# Patient Record
Sex: MALE | Race: WHITE | Employment: OTHER | URBAN - METROPOLITAN AREA
[De-identification: names, ages, dates, MRNs, and addresses within clinical notes are randomized per-mention and may not be internally consistent; named-entity substitution may affect disease eponyms.]

---

## 2018-09-06 ENCOUNTER — OFFICE VISIT (OUTPATIENT)
Dept: INTERNAL MEDICINE | Age: 63
End: 2018-09-06
Payer: MEDICARE

## 2018-09-06 VITALS
OXYGEN SATURATION: 98 % | BODY MASS INDEX: 35.47 KG/M2 | RESPIRATION RATE: 18 BRPM | DIASTOLIC BLOOD PRESSURE: 77 MMHG | HEIGHT: 67 IN | SYSTOLIC BLOOD PRESSURE: 128 MMHG | TEMPERATURE: 98 F | WEIGHT: 226 LBS | HEART RATE: 70 BPM

## 2018-09-06 DIAGNOSIS — R42 VERTIGO: ICD-10-CM

## 2018-09-06 DIAGNOSIS — H65.92 LEFT NON-SUPPURATIVE OTITIS MEDIA: Primary | ICD-10-CM

## 2018-09-06 DIAGNOSIS — H61.22 IMPACTED CERUMEN OF LEFT EAR: ICD-10-CM

## 2018-09-06 PROCEDURE — 99213 OFFICE O/P EST LOW 20 MIN: CPT | Performed by: NURSE PRACTITIONER

## 2018-09-06 RX ORDER — GLUCOSAMINE/CHONDR SU A SOD 750-600 MG
1500 TABLET ORAL
Status: ON HOLD | COMMUNITY
End: 2019-05-15 | Stop reason: HOSPADM

## 2018-09-06 RX ORDER — GLUCOSA SU 2KCL/CHONDROITIN SU 500-400 MG
100 CAPSULE ORAL DAILY
Status: ON HOLD | COMMUNITY
End: 2019-05-15 | Stop reason: HOSPADM

## 2018-09-06 RX ORDER — NITROGLYCERIN 0.4 MG/1
0.4 TABLET SUBLINGUAL EVERY 5 MIN PRN
COMMUNITY

## 2018-09-06 RX ORDER — ASPIRIN 81 MG/1
81 TABLET, CHEWABLE ORAL DAILY
Status: ON HOLD | COMMUNITY
End: 2019-05-15 | Stop reason: HOSPADM

## 2018-09-06 RX ORDER — CARVEDILOL 6.25 MG/1
6.25 TABLET ORAL 2 TIMES DAILY
COMMUNITY

## 2018-09-06 RX ORDER — PRAVASTATIN SODIUM 40 MG
40 TABLET ORAL DAILY
COMMUNITY
End: 2019-09-06 | Stop reason: ALTCHOICE

## 2018-09-06 ASSESSMENT — PATIENT HEALTH QUESTIONNAIRE - PHQ9
SUM OF ALL RESPONSES TO PHQ QUESTIONS 1-9: 0
SUM OF ALL RESPONSES TO PHQ9 QUESTIONS 1 & 2: 0
1. LITTLE INTEREST OR PLEASURE IN DOING THINGS: 0
2. FEELING DOWN, DEPRESSED OR HOPELESS: 0
SUM OF ALL RESPONSES TO PHQ QUESTIONS 1-9: 0

## 2018-09-06 ASSESSMENT — ENCOUNTER SYMPTOMS
RHINORRHEA: 1
COUGH: 0
SORE THROAT: 0

## 2018-09-06 NOTE — PROGRESS NOTES
Subjective:      Patient ID: Rigoberto Torres is a 70 y.o. male who presents today for:  Chief Complaint   Patient presents with    Ear Fullness     patient c/o not being able to hear out his right ear and is getting hearing roxy soon and gets dizzy ,not all the time        Ear Fullness    There is pain in both ears. This is a new problem. The current episode started in the past 7 days. The problem occurs constantly. The problem has been unchanged. There has been no fever. The patient is experiencing no pain. Associated symptoms include ear discharge, hearing loss and rhinorrhea (slight). Pertinent negatives include no coughing, headaches or sore throat. He has tried nothing for the symptoms. The treatment provided no relief. His past medical history is significant for hearing loss. There is no history of a chronic ear infection. Past Medical History:   Diagnosis Date    Congenital heart disease     Hearing loss      Past Surgical History:   Procedure Laterality Date    CARDIAC SURGERY      EYE SURGERY      JOINT REPLACEMENT      KNEE ARTHROPLASTY       Social History     Social History    Marital status:      Spouse name: N/A    Number of children: N/A    Years of education: N/A     Occupational History    Not on file. Social History Main Topics    Smoking status: Never Smoker    Smokeless tobacco: Current User     Types: Chew    Alcohol use No    Drug use: No    Sexual activity: No     Other Topics Concern    Not on file     Social History Narrative    No narrative on file     History reviewed. No pertinent family history. No Known Allergies  No current outpatient prescriptions on file prior to visit. No current facility-administered medications on file prior to visit. Review of Systems   Constitutional: Negative for activity change, appetite change and chills. HENT: Positive for congestion, ear discharge, hearing loss and rhinorrhea (slight).  Negative for ear

## 2018-09-07 RX ORDER — MECLIZINE HCL 12.5 MG/1
12.5 TABLET ORAL 3 TIMES DAILY PRN
Qty: 30 TABLET | Refills: 0 | Status: SHIPPED | OUTPATIENT
Start: 2018-09-07 | End: 2018-09-17

## 2018-10-01 ENCOUNTER — OFFICE VISIT (OUTPATIENT)
Dept: INTERNAL MEDICINE | Age: 63
End: 2018-10-01
Payer: MEDICARE

## 2018-10-01 VITALS
HEART RATE: 70 BPM | WEIGHT: 229 LBS | BODY MASS INDEX: 32.78 KG/M2 | DIASTOLIC BLOOD PRESSURE: 74 MMHG | OXYGEN SATURATION: 96 % | SYSTOLIC BLOOD PRESSURE: 128 MMHG | HEIGHT: 70 IN

## 2018-10-01 DIAGNOSIS — Z23 NEED FOR PNEUMOCOCCAL VACCINATION: ICD-10-CM

## 2018-10-01 DIAGNOSIS — Z95.1 S/P CORONARY ARTERY BYPASS GRAFT X 5: ICD-10-CM

## 2018-10-01 DIAGNOSIS — I25.10 ATHEROSCLEROSIS OF NATIVE CORONARY ARTERY OF NATIVE HEART WITHOUT ANGINA PECTORIS: ICD-10-CM

## 2018-10-01 DIAGNOSIS — Z11.59 NEED FOR HEPATITIS C SCREENING TEST: ICD-10-CM

## 2018-10-01 DIAGNOSIS — E78.5 HYPERLIPIDEMIA, UNSPECIFIED HYPERLIPIDEMIA TYPE: Primary | ICD-10-CM

## 2018-10-01 DIAGNOSIS — Z12.5 SCREENING FOR PROSTATE CANCER: ICD-10-CM

## 2018-10-01 DIAGNOSIS — Z12.11 COLON CANCER SCREENING: ICD-10-CM

## 2018-10-01 DIAGNOSIS — Z13.1 SCREENING FOR DIABETES MELLITUS (DM): ICD-10-CM

## 2018-10-01 PROCEDURE — 90670 PCV13 VACCINE IM: CPT | Performed by: FAMILY MEDICINE

## 2018-10-01 PROCEDURE — 99203 OFFICE O/P NEW LOW 30 MIN: CPT | Performed by: FAMILY MEDICINE

## 2018-10-01 PROCEDURE — G0009 ADMIN PNEUMOCOCCAL VACCINE: HCPCS | Performed by: FAMILY MEDICINE

## 2018-10-01 NOTE — PROGRESS NOTES
911.       No current facility-administered medications on file prior to visit. No Known Allergies    Chief Complaint   Patient presents with    Establish Care     previous pcp many years ago       HPI    New patient to the office  Currently only following with his cardiologist, getting his prescription refills from him    He does have a history of coronary artery disease, CABG ×5  He currently does follow Dr. Raj Olvera  He does not endorse any chest pain, shortness of breath, lower extremity edema, irregular heartbeat    He is currently on a beta blocker and a statin, no history of hypertension    He is completely overdue for his preventative care and screening    Review of Systems  Constitutional: Negative for fatigue, fever and sweats. HEENT: Negative for eye discharge and vision loss. Negative for ear drainage, hearing loss and nasal drainage. Respiratory: Negative for cough, dyspnea and wheezing. Cardiovascular:  Negative for chest pain, claudication and irregular heartbeat/palpitations. Physical Exam  Vitals:    10/01/18 1035   BP: 128/74   Pulse: 70   SpO2: 96%   Body mass index is 33.33 kg/m². Physical Exam  Constitutional:  Appears well-developed and well-nourished. No distress. HENT:   Head: Normocephalic and atraumatic. Right Ear: External ear normal.   Left Ear: External ear normal.   Nose: Nose normal.   Eyes: Conjunctivae and EOM are normal.  Right eye exhibits no discharge. Left eye exhibits no discharge. No scleral icterus. Neck: Normal range of motion. Cardiovascular: Normal rate, regular rhythm and normal heart sounds. No murmur heard. Pulmonary/Chest: Effort normal and breath sounds normal. No respiratory distress. no wheezes. no rales. no tenderness. Musculoskeletal: Normal range of motion. Neurological: alert. Skin: not diaphoretic. Psychiatric: normal mood and affect.  behavior is normal. Judgment and thought content normal.   Nursing note and vitals

## 2018-10-02 ENCOUNTER — HOSPITAL ENCOUNTER (OUTPATIENT)
Age: 63
Setting detail: SPECIMEN
Discharge: HOME | End: 2018-10-02
Payer: MEDICARE

## 2018-10-02 ENCOUNTER — NURSE ONLY (OUTPATIENT)
Dept: INTERNAL MEDICINE | Age: 63
End: 2018-10-02

## 2018-10-02 DIAGNOSIS — I25.10 ATHEROSCLEROSIS OF NATIVE CORONARY ARTERY OF NATIVE HEART WITHOUT ANGINA PECTORIS: ICD-10-CM

## 2018-10-02 DIAGNOSIS — Z95.1 S/P CORONARY ARTERY BYPASS GRAFT X 5: ICD-10-CM

## 2018-10-02 DIAGNOSIS — E78.5 HYPERLIPIDEMIA, UNSPECIFIED HYPERLIPIDEMIA TYPE: ICD-10-CM

## 2018-10-02 DIAGNOSIS — E78.5 HYPERLIPIDEMIA, UNSPECIFIED HYPERLIPIDEMIA TYPE: Primary | ICD-10-CM

## 2018-10-02 DIAGNOSIS — Z12.5 SCREENING FOR PROSTATE CANCER: ICD-10-CM

## 2018-10-02 DIAGNOSIS — Z11.59 NEED FOR HEPATITIS C SCREENING TEST: ICD-10-CM

## 2018-10-02 LAB
ALBUMIN SERPL-MCNC: 4.3 G/DL (ref 3.9–4.9)
ALP BLD-CCNC: 67 U/L (ref 35–104)
ALT SERPL-CCNC: 19 U/L (ref 0–41)
ANION GAP SERPL CALCULATED.3IONS-SCNC: 16 MEQ/L (ref 7–13)
AST SERPL-CCNC: 21 U/L (ref 0–40)
BASOPHILS ABSOLUTE: 0.1 K/UL (ref 0–0.2)
BASOPHILS RELATIVE PERCENT: 1.1 %
BILIRUB SERPL-MCNC: 0.8 MG/DL (ref 0–1.2)
BUN BLDV-MCNC: 29 MG/DL (ref 8–23)
CALCIUM SERPL-MCNC: 9.3 MG/DL (ref 8.6–10.2)
CHLORIDE BLD-SCNC: 103 MEQ/L (ref 98–107)
CHOLESTEROL, TOTAL: 161 MG/DL (ref 0–199)
CO2: 21 MEQ/L (ref 22–29)
CREAT SERPL-MCNC: 1.38 MG/DL (ref 0.7–1.2)
EOSINOPHILS ABSOLUTE: 0.4 K/UL (ref 0–0.7)
EOSINOPHILS RELATIVE PERCENT: 4.4 %
GFR AFRICAN AMERICAN: >60
GFR NON-AFRICAN AMERICAN: 51.7
GLOBULIN: 3.1 G/DL (ref 2.3–3.5)
GLUCOSE BLD-MCNC: 95 MG/DL (ref 74–109)
HCT VFR BLD CALC: 47.2 % (ref 42–52)
HDLC SERPL-MCNC: 28 MG/DL (ref 40–59)
HEMOGLOBIN: 15.6 G/DL (ref 14–18)
HEPATITIS C ANTIBODY INTERPRETATION: NORMAL
LDL CHOLESTEROL CALCULATED: 92 MG/DL (ref 0–129)
LYMPHOCYTES ABSOLUTE: 1.7 K/UL (ref 1–4.8)
LYMPHOCYTES RELATIVE PERCENT: 19.5 %
MCH RBC QN AUTO: 31.1 PG (ref 27–31.3)
MCHC RBC AUTO-ENTMCNC: 33.1 % (ref 33–37)
MCV RBC AUTO: 93.9 FL (ref 80–100)
MONOCYTES ABSOLUTE: 0.9 K/UL (ref 0.2–0.8)
MONOCYTES RELATIVE PERCENT: 9.8 %
NEUTROPHILS ABSOLUTE: 5.8 K/UL (ref 1.4–6.5)
NEUTROPHILS RELATIVE PERCENT: 65.2 %
PDW BLD-RTO: 14.8 % (ref 11.5–14.5)
PLATELET # BLD: 293 K/UL (ref 130–400)
POTASSIUM SERPL-SCNC: 4.6 MEQ/L (ref 3.5–5.1)
PROSTATE SPECIFIC ANTIGEN: 0.97 NG/ML (ref 0–5.4)
RBC # BLD: 5.02 M/UL (ref 4.7–6.1)
SODIUM BLD-SCNC: 140 MEQ/L (ref 132–144)
TOTAL PROTEIN: 7.4 G/DL (ref 6.4–8.1)
TRIGL SERPL-MCNC: 204 MG/DL (ref 0–200)
WBC # BLD: 8.8 K/UL (ref 4.8–10.8)

## 2018-10-02 PROCEDURE — 85025 COMPLETE CBC W/AUTO DIFF WBC: CPT

## 2018-10-02 PROCEDURE — 86803 HEPATITIS C AB TEST: CPT

## 2018-10-02 PROCEDURE — G0103 PSA SCREENING: HCPCS

## 2018-10-02 PROCEDURE — 80053 COMPREHEN METABOLIC PANEL: CPT

## 2018-10-02 PROCEDURE — 80061 LIPID PANEL: CPT

## 2018-10-03 PROBLEM — N17.9 ACUTE KIDNEY INJURY (HCC): Status: ACTIVE | Noted: 2018-10-03

## 2018-10-08 ENCOUNTER — TELEPHONE (OUTPATIENT)
Dept: OPERATING ROOM | Age: 63
End: 2018-10-08

## 2018-10-16 ENCOUNTER — ANESTHESIA EVENT (OUTPATIENT)
Dept: OPERATING ROOM | Age: 63
End: 2018-10-16
Payer: MEDICARE

## 2018-10-17 ENCOUNTER — HOSPITAL ENCOUNTER (OUTPATIENT)
Age: 63
Setting detail: OUTPATIENT SURGERY
Discharge: HOME | End: 2018-10-17
Attending: SPECIALIST | Admitting: SPECIALIST
Payer: MEDICARE

## 2018-10-17 ENCOUNTER — ANESTHESIA (OUTPATIENT)
Dept: OPERATING ROOM | Age: 63
End: 2018-10-17
Payer: MEDICARE

## 2018-10-17 VITALS
SYSTOLIC BLOOD PRESSURE: 118 MMHG | HEART RATE: 56 BPM | RESPIRATION RATE: 16 BRPM | TEMPERATURE: 98.7 F | HEIGHT: 68 IN | OXYGEN SATURATION: 92 % | DIASTOLIC BLOOD PRESSURE: 73 MMHG | BODY MASS INDEX: 34.71 KG/M2 | WEIGHT: 229 LBS

## 2018-10-17 VITALS
OXYGEN SATURATION: 94 % | DIASTOLIC BLOOD PRESSURE: 75 MMHG | RESPIRATION RATE: 17 BRPM | SYSTOLIC BLOOD PRESSURE: 128 MMHG

## 2018-10-17 PROCEDURE — 7100000011 HC PHASE II RECOVERY - ADDTL 15 MIN: Performed by: SPECIALIST

## 2018-10-17 PROCEDURE — 7100000010 HC PHASE II RECOVERY - FIRST 15 MIN: Performed by: SPECIALIST

## 2018-10-17 PROCEDURE — 3700000001 HC ADD 15 MINUTES (ANESTHESIA): Performed by: SPECIALIST

## 2018-10-17 PROCEDURE — 88305 TISSUE EXAM BY PATHOLOGIST: CPT

## 2018-10-17 PROCEDURE — 2580000003 HC RX 258: Performed by: SPECIALIST

## 2018-10-17 PROCEDURE — 3609027000 HC COLONOSCOPY: Performed by: SPECIALIST

## 2018-10-17 PROCEDURE — 3700000000 HC ANESTHESIA ATTENDED CARE: Performed by: SPECIALIST

## 2018-10-17 PROCEDURE — 2709999900 HC NON-CHARGEABLE SUPPLY: Performed by: SPECIALIST

## 2018-10-17 PROCEDURE — 2580000003 HC RX 258: Performed by: NURSE PRACTITIONER

## 2018-10-17 PROCEDURE — 6360000002 HC RX W HCPCS: Performed by: NURSE ANESTHETIST, CERTIFIED REGISTERED

## 2018-10-17 RX ORDER — SODIUM CHLORIDE 0.9 % (FLUSH) 0.9 %
10 SYRINGE (ML) INJECTION PRN
Status: DISCONTINUED | OUTPATIENT
Start: 2018-10-17 | End: 2018-10-17 | Stop reason: HOSPADM

## 2018-10-17 RX ORDER — MAGNESIUM HYDROXIDE 1200 MG/15ML
LIQUID ORAL PRN
Status: DISCONTINUED | OUTPATIENT
Start: 2018-10-17 | End: 2018-10-17 | Stop reason: HOSPADM

## 2018-10-17 RX ORDER — LIDOCAINE HYDROCHLORIDE 10 MG/ML
1 INJECTION, SOLUTION EPIDURAL; INFILTRATION; INTRACAUDAL; PERINEURAL
Status: DISCONTINUED | OUTPATIENT
Start: 2018-10-17 | End: 2018-10-17 | Stop reason: HOSPADM

## 2018-10-17 RX ORDER — COVID-19 ANTIGEN TEST
KIT MISCELLANEOUS
Status: ON HOLD | COMMUNITY
End: 2019-05-15 | Stop reason: HOSPADM

## 2018-10-17 RX ORDER — PROPOFOL 10 MG/ML
INJECTION, EMULSION INTRAVENOUS PRN
Status: DISCONTINUED | OUTPATIENT
Start: 2018-10-17 | End: 2018-10-17 | Stop reason: SDUPTHER

## 2018-10-17 RX ORDER — SODIUM CHLORIDE 0.9 % (FLUSH) 0.9 %
10 SYRINGE (ML) INJECTION EVERY 12 HOURS SCHEDULED
Status: DISCONTINUED | OUTPATIENT
Start: 2018-10-17 | End: 2018-10-17 | Stop reason: HOSPADM

## 2018-10-17 RX ORDER — ONDANSETRON 2 MG/ML
4 INJECTION INTRAMUSCULAR; INTRAVENOUS
Status: DISCONTINUED | OUTPATIENT
Start: 2018-10-17 | End: 2018-10-17 | Stop reason: HOSPADM

## 2018-10-17 RX ORDER — SODIUM CHLORIDE, SODIUM LACTATE, POTASSIUM CHLORIDE, CALCIUM CHLORIDE 600; 310; 30; 20 MG/100ML; MG/100ML; MG/100ML; MG/100ML
INJECTION, SOLUTION INTRAVENOUS CONTINUOUS
Status: DISCONTINUED | OUTPATIENT
Start: 2018-10-17 | End: 2018-10-17 | Stop reason: HOSPADM

## 2018-10-17 RX ADMIN — PROPOFOL 30 MG: 10 INJECTION, EMULSION INTRAVENOUS at 08:54

## 2018-10-17 RX ADMIN — PROPOFOL 60 MG: 10 INJECTION, EMULSION INTRAVENOUS at 08:42

## 2018-10-17 RX ADMIN — SODIUM CHLORIDE, POTASSIUM CHLORIDE, SODIUM LACTATE AND CALCIUM CHLORIDE: 600; 310; 30; 20 INJECTION, SOLUTION INTRAVENOUS at 08:17

## 2018-10-17 RX ADMIN — PROPOFOL 30 MG: 10 INJECTION, EMULSION INTRAVENOUS at 08:43

## 2018-10-17 RX ADMIN — PROPOFOL 30 MG: 10 INJECTION, EMULSION INTRAVENOUS at 08:49

## 2018-10-17 ASSESSMENT — PULMONARY FUNCTION TESTS
PIF_VALUE: 0
PIF_VALUE: 0
PIF_VALUE: 1
PIF_VALUE: 0

## 2018-10-17 NOTE — ANESTHESIA PRE PROCEDURE
10/02/2018    BUN 29 10/02/2018    CREATININE 1.38 10/02/2018    GFRAA >60.0 10/02/2018    LABGLOM 51.7 10/02/2018    GLUCOSE 95 10/02/2018    PROT 7.4 10/02/2018    CALCIUM 9.3 10/02/2018    BILITOT 0.8 10/02/2018    ALKPHOS 67 10/02/2018    AST 21 10/02/2018    ALT 19 10/02/2018       POC Tests: No results for input(s): POCGLU, POCNA, POCK, POCCL, POCBUN, POCHEMO, POCHCT in the last 72 hours. Coags: No results found for: PROTIME, INR, APTT    HCG (If Applicable): No results found for: PREGTESTUR, PREGSERUM, HCG, HCGQUANT     ABGs: No results found for: PHART, PO2ART, XPK2SHF, SAE8ODV, BEART, H6HAUFFT     Type & Screen (If Applicable):  No results found for: LABABO, [address]    Anesthesia Evaluation  Patient summary reviewed and Nursing notes reviewed  Airway: Mallampati: II  TM distance: >3 FB   Neck ROM: full  Mouth opening: > = 3 FB Dental: normal exam         Pulmonary:Negative Pulmonary ROS and normal exam                               Cardiovascular:    (+) hypertension:, CAD:, hyperlipidemia         Beta Blocker:  Dose within 24 Hrs         Neuro/Psych:   Negative Neuro/Psych ROS              GI/Hepatic/Renal: Neg GI/Hepatic/Renal ROS            Endo/Other: Negative Endo/Other ROS                    Abdominal:           Vascular: negative vascular ROS. Anesthesia Plan      MAC     ASA 2       Induction: intravenous. Anesthetic plan and risks discussed with patient. Plan discussed with attending.                   Malia Scherer, APRN - CRNA   10/17/2018

## 2018-11-28 ENCOUNTER — OFFICE VISIT (OUTPATIENT)
Dept: INTERNAL MEDICINE | Age: 63
End: 2018-11-28
Payer: MEDICARE

## 2018-11-28 VITALS
DIASTOLIC BLOOD PRESSURE: 72 MMHG | TEMPERATURE: 98.1 F | WEIGHT: 234 LBS | HEART RATE: 97 BPM | BODY MASS INDEX: 35.58 KG/M2 | OXYGEN SATURATION: 97 % | SYSTOLIC BLOOD PRESSURE: 124 MMHG

## 2018-11-28 DIAGNOSIS — H66.90 ACUTE OTITIS MEDIA, UNSPECIFIED OTITIS MEDIA TYPE: Primary | ICD-10-CM

## 2018-11-28 DIAGNOSIS — J32.9 SINUSITIS, UNSPECIFIED CHRONICITY, UNSPECIFIED LOCATION: ICD-10-CM

## 2018-11-28 PROCEDURE — 99213 OFFICE O/P EST LOW 20 MIN: CPT | Performed by: NURSE PRACTITIONER

## 2018-11-28 RX ORDER — AMOXICILLIN AND CLAVULANATE POTASSIUM 875; 125 MG/1; MG/1
1 TABLET, FILM COATED ORAL 2 TIMES DAILY
Qty: 20 TABLET | Refills: 0 | Status: SHIPPED | OUTPATIENT
Start: 2018-11-28 | End: 2018-12-08

## 2018-11-28 ASSESSMENT — ENCOUNTER SYMPTOMS
SHORTNESS OF BREATH: 0
EYE DISCHARGE: 1
GASTROINTESTINAL NEGATIVE: 1
RHINORRHEA: 1
COUGH: 1
WHEEZING: 1
SORE THROAT: 0
CHEST TIGHTNESS: 0
SINUS PRESSURE: 1

## 2018-11-28 NOTE — PROGRESS NOTES
Subjective:      Patient ID: Haresh Sims is a 70 y.o. male who presents today for:  Chief Complaint   Patient presents with    Congestion     head and chest x week. productive cough. taking dayquil robutussin and alkaseltzer       URI    This is a new problem. The current episode started in the past 7 days. There has been no fever. Associated symptoms include congestion, coughing, rhinorrhea and wheezing. Pertinent negatives include no ear pain, headaches or sore throat.        Past Medical History:   Diagnosis Date    Acute kidney injury ([address] 10/3/2018    Congenital heart disease     Coronary atherosclerosis 9/8/2015    Hearing loss     HTN (hypertension) 9/8/2015    Hyperlipidemia 9/8/2015     Past Surgical History:   Procedure Laterality Date    CARDIAC SURGERY      EYE SURGERY      JOINT REPLACEMENT      left shoulder, both knees    KNEE ARTHROPLASTY      [place] COLON CA SCRN NOT  [address] IND N/A 10/17/2018    COLONOSCOPY performed by Dax Nails MD at [place] [address] Marital status:      Spouse name: N/A    Number of children: 2    Years of education: N/A     Occupational History    retired      he was a philip x 48 years     Social History Main Topics    Smoking status: Never Smoker    Smokeless tobacco: Current User     Types: Chew    Alcohol use No    Drug use: No    Sexual activity: No     Other Topics Concern    Not on file     Social History Narrative    No narrative on file     Family History   Problem Relation Age of Onset    Alzheimer's Disease Mother     Cancer Father         leukemia      No Known Allergies  Current Outpatient Prescriptions on File Prior to Visit   Medication Sig Dispense Refill    Naproxen Sodium (ALEVE) 220 MG CAPS Take by mouth      aspirin (ASPIRIN 81) 81 MG chewable tablet Take 81 mg by mouth Daily      carvedilol (COREG) 6.25 MG tablet Take 6.25 mg by mouth Daily      Glucosamine HCl 1500 MG TABS Take 1,500 mg by mouth Daily with lunch      Coenzyme Q10 (CO Q10) 100 MG CAPS Take 100 mg by mouth daily      pravastatin (PRAVACHOL) 40 MG tablet Take 40 mg by mouth daily      nitroGLYCERIN (NITROSTAT) 0.4 MG SL tablet Place 0.4 mg under the tongue every 5 minutes as needed for Chest pain up to max of 3 total doses. If no relief after 1 dose, call 911. No current facility-administered medications on file prior to visit. Review of Systems   Constitutional: Negative for chills, fatigue and fever. HENT: Positive for congestion, rhinorrhea and sinus pressure. Negative for ear pain and sore throat. Eyes: Positive for discharge. Respiratory: Positive for cough and wheezing. Negative for chest tightness and shortness of breath. Cardiovascular: Negative. Gastrointestinal: Negative. Endocrine: Negative. Genitourinary: Negative. Musculoskeletal: Negative for myalgias. Skin: Negative. Allergic/Immunologic: Negative for environmental allergies. Neurological: Negative. Negative for headaches. Hematological: Negative. Psychiatric/Behavioral: Negative. Objective:   /72   Pulse 97   Temp 98.1 °F (36.7 °C) (Oral)   Wt 234 lb (106.1 kg)   SpO2 97%   BMI 35.58 kg/m²     Physical Exam   Constitutional: He is oriented to person, place, and time. He appears well-developed. HENT:   Head: Normocephalic. Left ear cloudy effusion   Eyes: Right eye exhibits no discharge. Left eye exhibits no discharge. Neck: Normal range of motion. Cardiovascular: Normal rate, regular rhythm and normal heart sounds. Pulmonary/Chest: Effort normal and breath sounds normal. No respiratory distress. Musculoskeletal: Normal range of motion. Neurological: He is alert and oriented to person, place, and time. Skin: Skin is warm. He is not diaphoretic. Psychiatric: He has a normal mood and affect. Assessment:       Diagnosis Orders   1.  Acute otitis media, unspecified otitis

## 2019-05-08 ENCOUNTER — HOSPITAL ENCOUNTER (EMERGENCY)
Age: 64
Discharge: ANOTHER HOSPITAL | End: 2019-05-08
Attending: EMERGENCY MEDICINE
Payer: MEDICARE

## 2019-05-08 ENCOUNTER — APPOINTMENT (OUTPATIENT)
Dept: CT IMAGING | Age: 64
End: 2019-05-08
Payer: MEDICARE

## 2019-05-08 ENCOUNTER — HOSPITAL ENCOUNTER (OUTPATIENT)
Age: 64
Setting detail: OBSERVATION
Discharge: HOME | End: 2019-05-09
Attending: INTERNAL MEDICINE | Admitting: INTERNAL MEDICINE
Payer: MEDICARE

## 2019-05-08 VITALS
RESPIRATION RATE: 16 BRPM | HEIGHT: 68 IN | BODY MASS INDEX: 34.1 KG/M2 | WEIGHT: 225 LBS | OXYGEN SATURATION: 96 % | SYSTOLIC BLOOD PRESSURE: 137 MMHG | DIASTOLIC BLOOD PRESSURE: 65 MMHG | HEART RATE: 67 BPM | TEMPERATURE: 98.4 F

## 2019-05-08 DIAGNOSIS — N20.0 KIDNEY STONE: Primary | ICD-10-CM

## 2019-05-08 DIAGNOSIS — R10.9 FLANK PAIN: ICD-10-CM

## 2019-05-08 LAB
ALBUMIN SERPL-MCNC: 4.5 G/DL (ref 3.5–4.6)
ALP BLD-CCNC: 70 U/L (ref 35–104)
ALT SERPL-CCNC: 25 U/L (ref 0–41)
ANION GAP SERPL CALCULATED.3IONS-SCNC: 15 MEQ/L (ref 9–15)
AST SERPL-CCNC: 30 U/L (ref 0–40)
BACTERIA: ABNORMAL /HPF
BASOPHILS ABSOLUTE: 0 K/UL (ref 0–0.2)
BASOPHILS RELATIVE PERCENT: 0.2 %
BILIRUB SERPL-MCNC: 1.3 MG/DL (ref 0.2–0.7)
BILIRUBIN URINE: NEGATIVE
BLOOD, URINE: ABNORMAL
BUN BLDV-MCNC: 26 MG/DL (ref 8–23)
CALCIUM SERPL-MCNC: 9.6 MG/DL (ref 8.5–9.9)
CHLORIDE BLD-SCNC: 102 MEQ/L (ref 95–107)
CLARITY: CLEAR
CO2: 24 MEQ/L (ref 20–31)
COLOR: YELLOW
CREAT SERPL-MCNC: 1.34 MG/DL (ref 0.7–1.2)
EOSINOPHILS ABSOLUTE: 0.1 K/UL (ref 0–0.7)
EOSINOPHILS RELATIVE PERCENT: 0.7 %
EPITHELIAL CELLS, UA: ABNORMAL /HPF
GFR AFRICAN AMERICAN: >60
GFR NON-AFRICAN AMERICAN: 53.3
GLOBULIN: 3.6 G/DL (ref 2.3–3.5)
GLUCOSE BLD-MCNC: 97 MG/DL (ref 70–99)
GLUCOSE URINE: NEGATIVE MG/DL
HCT VFR BLD CALC: 47.2 % (ref 42–52)
HEMOGLOBIN: 15.7 G/DL (ref 14–18)
KETONES, URINE: 15 MG/DL
LEUKOCYTE ESTERASE, URINE: NEGATIVE
LYMPHOCYTES ABSOLUTE: 1.3 K/UL (ref 1–4.8)
LYMPHOCYTES RELATIVE PERCENT: 9.7 %
MCH RBC QN AUTO: 31.1 PG (ref 27–31.3)
MCHC RBC AUTO-ENTMCNC: 33.2 % (ref 33–37)
MCV RBC AUTO: 93.6 FL (ref 80–100)
MONOCYTES ABSOLUTE: 0.6 K/UL (ref 0.2–0.8)
MONOCYTES RELATIVE PERCENT: 4.4 %
NEUTROPHILS ABSOLUTE: 11.3 K/UL (ref 1.4–6.5)
NEUTROPHILS RELATIVE PERCENT: 85 %
NITRITE, URINE: NEGATIVE
PDW BLD-RTO: 13.8 % (ref 11.5–14.5)
PH UA: 5 (ref 5–9)
PLATELET # BLD: 297 K/UL (ref 130–400)
POTASSIUM SERPL-SCNC: 4.8 MEQ/L (ref 3.4–4.9)
PROTEIN UA: 100 MG/DL
RBC # BLD: 5.04 M/UL (ref 4.7–6.1)
RBC UA: >100 /HPF (ref 0–2)
SODIUM BLD-SCNC: 141 MEQ/L (ref 135–144)
SPECIFIC GRAVITY UA: >=1.03 (ref 1–1.03)
TOTAL PROTEIN: 8.1 G/DL (ref 6.3–8)
URINE REFLEX TO CULTURE: YES
UROBILINOGEN, URINE: 0.2 E.U./DL
WBC # BLD: 13.3 K/UL (ref 4.8–10.8)
WBC UA: ABNORMAL /HPF (ref 0–5)

## 2019-05-08 PROCEDURE — 85025 COMPLETE CBC W/AUTO DIFF WBC: CPT

## 2019-05-08 PROCEDURE — 36415 COLL VENOUS BLD VENIPUNCTURE: CPT

## 2019-05-08 PROCEDURE — 2580000003 HC RX 258: Performed by: EMERGENCY MEDICINE

## 2019-05-08 PROCEDURE — 99285 EMERGENCY DEPT VISIT HI MDM: CPT

## 2019-05-08 PROCEDURE — 74176 CT ABD & PELVIS W/O CONTRAST: CPT

## 2019-05-08 PROCEDURE — 6370000000 HC RX 637 (ALT 250 FOR IP): Performed by: EMERGENCY MEDICINE

## 2019-05-08 PROCEDURE — 81001 URINALYSIS AUTO W/SCOPE: CPT

## 2019-05-08 PROCEDURE — 6370000000 HC RX 637 (ALT 250 FOR IP): Performed by: INTERNAL MEDICINE

## 2019-05-08 PROCEDURE — 96374 THER/PROPH/DIAG INJ IV PUSH: CPT

## 2019-05-08 PROCEDURE — G0378 HOSPITAL OBSERVATION PER HR: HCPCS

## 2019-05-08 PROCEDURE — 96375 TX/PRO/DX INJ NEW DRUG ADDON: CPT

## 2019-05-08 PROCEDURE — 6360000002 HC RX W HCPCS: Performed by: INTERNAL MEDICINE

## 2019-05-08 PROCEDURE — 2580000003 HC RX 258: Performed by: INTERNAL MEDICINE

## 2019-05-08 PROCEDURE — 80053 COMPREHEN METABOLIC PANEL: CPT

## 2019-05-08 PROCEDURE — 6360000002 HC RX W HCPCS: Performed by: EMERGENCY MEDICINE

## 2019-05-08 PROCEDURE — 87086 URINE CULTURE/COLONY COUNT: CPT

## 2019-05-08 RX ORDER — ONDANSETRON 2 MG/ML
4 INJECTION INTRAMUSCULAR; INTRAVENOUS ONCE
Status: COMPLETED | OUTPATIENT
Start: 2019-05-08 | End: 2019-05-08

## 2019-05-08 RX ORDER — SODIUM CHLORIDE 0.9 % (FLUSH) 0.9 %
3 SYRINGE (ML) INJECTION EVERY 8 HOURS
Status: DISCONTINUED | OUTPATIENT
Start: 2019-05-08 | End: 2019-05-08 | Stop reason: HOSPADM

## 2019-05-08 RX ORDER — ACETAMINOPHEN 325 MG/1
650 TABLET ORAL EVERY 4 HOURS PRN
Status: DISCONTINUED | OUTPATIENT
Start: 2019-05-08 | End: 2019-05-09 | Stop reason: HOSPADM

## 2019-05-08 RX ORDER — MORPHINE SULFATE 4 MG/ML
4 INJECTION, SOLUTION INTRAMUSCULAR; INTRAVENOUS ONCE
Status: COMPLETED | OUTPATIENT
Start: 2019-05-08 | End: 2019-05-08

## 2019-05-08 RX ORDER — SODIUM CHLORIDE 9 MG/ML
INJECTION, SOLUTION INTRAVENOUS CONTINUOUS
Status: DISCONTINUED | OUTPATIENT
Start: 2019-05-08 | End: 2019-05-09 | Stop reason: HOSPADM

## 2019-05-08 RX ORDER — ASPIRIN 81 MG/1
81 TABLET, CHEWABLE ORAL DAILY
Status: DISCONTINUED | OUTPATIENT
Start: 2019-05-09 | End: 2019-05-09

## 2019-05-08 RX ORDER — CIPROFLOXACIN 2 MG/ML
400 INJECTION, SOLUTION INTRAVENOUS ONCE
Status: COMPLETED | OUTPATIENT
Start: 2019-05-08 | End: 2019-05-08

## 2019-05-08 RX ORDER — SODIUM CHLORIDE 9 MG/ML
INJECTION, SOLUTION INTRAVENOUS CONTINUOUS
Status: DISCONTINUED | OUTPATIENT
Start: 2019-05-08 | End: 2019-05-08 | Stop reason: HOSPADM

## 2019-05-08 RX ORDER — SODIUM CHLORIDE 0.9 % (FLUSH) 0.9 %
10 SYRINGE (ML) INJECTION PRN
Status: DISCONTINUED | OUTPATIENT
Start: 2019-05-08 | End: 2019-05-09 | Stop reason: HOSPADM

## 2019-05-08 RX ORDER — ONDANSETRON 2 MG/ML
4 INJECTION INTRAMUSCULAR; INTRAVENOUS EVERY 6 HOURS PRN
Status: DISCONTINUED | OUTPATIENT
Start: 2019-05-08 | End: 2019-05-09 | Stop reason: HOSPADM

## 2019-05-08 RX ORDER — SODIUM CHLORIDE 0.9 % (FLUSH) 0.9 %
10 SYRINGE (ML) INJECTION EVERY 12 HOURS SCHEDULED
Status: DISCONTINUED | OUTPATIENT
Start: 2019-05-08 | End: 2019-05-09 | Stop reason: HOSPADM

## 2019-05-08 RX ORDER — TAMSULOSIN HYDROCHLORIDE 0.4 MG/1
0.4 CAPSULE ORAL ONCE
Status: COMPLETED | OUTPATIENT
Start: 2019-05-08 | End: 2019-05-08

## 2019-05-08 RX ORDER — PRAVASTATIN SODIUM 40 MG
40 TABLET ORAL DAILY
Status: DISCONTINUED | OUTPATIENT
Start: 2019-05-09 | End: 2019-05-09 | Stop reason: HOSPADM

## 2019-05-08 RX ORDER — KETOROLAC TROMETHAMINE 15 MG/ML
15 INJECTION, SOLUTION INTRAMUSCULAR; INTRAVENOUS EVERY 6 HOURS PRN
Status: DISCONTINUED | OUTPATIENT
Start: 2019-05-08 | End: 2019-05-09

## 2019-05-08 RX ORDER — KETOROLAC TROMETHAMINE 30 MG/ML
30 INJECTION, SOLUTION INTRAMUSCULAR; INTRAVENOUS ONCE
Status: COMPLETED | OUTPATIENT
Start: 2019-05-08 | End: 2019-05-08

## 2019-05-08 RX ORDER — DOXAZOSIN 2 MG/1
4 TABLET ORAL NIGHTLY
Status: DISCONTINUED | OUTPATIENT
Start: 2019-05-08 | End: 2019-05-09 | Stop reason: HOSPADM

## 2019-05-08 RX ORDER — 0.9 % SODIUM CHLORIDE 0.9 %
500 INTRAVENOUS SOLUTION INTRAVENOUS ONCE
Status: COMPLETED | OUTPATIENT
Start: 2019-05-08 | End: 2019-05-08

## 2019-05-08 RX ORDER — CARVEDILOL 6.25 MG/1
6.25 TABLET ORAL DAILY
Status: DISCONTINUED | OUTPATIENT
Start: 2019-05-09 | End: 2019-05-09 | Stop reason: HOSPADM

## 2019-05-08 RX ADMIN — KETOROLAC TROMETHAMINE 30 MG: 30 INJECTION, SOLUTION INTRAMUSCULAR at 17:48

## 2019-05-08 RX ADMIN — TAMSULOSIN HYDROCHLORIDE 0.4 MG: 0.4 CAPSULE ORAL at 19:40

## 2019-05-08 RX ADMIN — HYDROMORPHONE HYDROCHLORIDE 0.5 MG: 1 INJECTION, SOLUTION INTRAMUSCULAR; INTRAVENOUS; SUBCUTANEOUS at 17:48

## 2019-05-08 RX ADMIN — SODIUM CHLORIDE: 9 INJECTION, SOLUTION INTRAVENOUS at 17:48

## 2019-05-08 RX ADMIN — SODIUM CHLORIDE 500 ML: 9 INJECTION, SOLUTION INTRAVENOUS at 17:10

## 2019-05-08 RX ADMIN — Medication 3 ML: at 19:40

## 2019-05-08 RX ADMIN — DOXAZOSIN 4 MG: 2 TABLET ORAL at 22:00

## 2019-05-08 RX ADMIN — Medication 10 ML: at 22:01

## 2019-05-08 RX ADMIN — SODIUM CHLORIDE: 9 INJECTION, SOLUTION INTRAVENOUS at 22:00

## 2019-05-08 RX ADMIN — KETOROLAC TROMETHAMINE 15 MG: 15 INJECTION, SOLUTION INTRAMUSCULAR; INTRAVENOUS at 22:00

## 2019-05-08 RX ADMIN — MORPHINE SULFATE 4 MG: 4 INJECTION, SOLUTION INTRAMUSCULAR; INTRAVENOUS at 17:11

## 2019-05-08 RX ADMIN — Medication 3 ML: at 17:18

## 2019-05-08 RX ADMIN — ONDANSETRON 4 MG: 2 INJECTION INTRAMUSCULAR; INTRAVENOUS at 17:11

## 2019-05-08 RX ADMIN — CIPROFLOXACIN 400 MG: 2 INJECTION, SOLUTION INTRAVENOUS at 18:25

## 2019-05-08 ASSESSMENT — ENCOUNTER SYMPTOMS
CHEST TIGHTNESS: 0
EYE DISCHARGE: 0
BACK PAIN: 0
VOICE CHANGE: 0
DIARRHEA: 0
TROUBLE SWALLOWING: 0
SINUS PRESSURE: 0
CONSTIPATION: 0
SHORTNESS OF BREATH: 0
STRIDOR: 0
CHOKING: 0
FACIAL SWELLING: 0
BLOOD IN STOOL: 0
SORE THROAT: 0
VOMITING: 0
ABDOMINAL PAIN: 1
EYE PAIN: 0
COUGH: 0
WHEEZING: 0
EYE REDNESS: 0

## 2019-05-08 ASSESSMENT — PAIN - FUNCTIONAL ASSESSMENT: PAIN_FUNCTIONAL_ASSESSMENT: ACTIVITIES ARE NOT PREVENTED

## 2019-05-08 ASSESSMENT — PAIN DESCRIPTION - FREQUENCY: FREQUENCY: INTERMITTENT

## 2019-05-08 ASSESSMENT — PAIN DESCRIPTION - ONSET: ONSET: SUDDEN

## 2019-05-08 ASSESSMENT — PAIN DESCRIPTION - PAIN TYPE: TYPE: ACUTE PAIN

## 2019-05-08 ASSESSMENT — PAIN DESCRIPTION - ORIENTATION: ORIENTATION: RIGHT

## 2019-05-08 ASSESSMENT — PAIN DESCRIPTION - DESCRIPTORS: DESCRIPTORS: SHARP

## 2019-05-08 ASSESSMENT — PAIN SCALES - GENERAL
PAINLEVEL_OUTOF10: 4
PAINLEVEL_OUTOF10: 0
PAINLEVEL_OUTOF10: 6

## 2019-05-08 ASSESSMENT — PAIN DESCRIPTION - PROGRESSION: CLINICAL_PROGRESSION: NOT CHANGED

## 2019-05-08 ASSESSMENT — PAIN DESCRIPTION - LOCATION: LOCATION: FLANK

## 2019-05-08 NOTE — ED NOTES
Dr. Leilani Angeles accepted this patient. Called the TC, spoke to Tejinder Beckham, to get a room assignment and to set up transportation; they will call back with same.      Johnathan Phillips  05/08/19 1945

## 2019-05-08 NOTE — ED PROVIDER NOTES
Neurological: Negative for tremors, seizures, syncope, weakness, numbness and headaches. Hematological: Negative for adenopathy. Does not bruise/bleed easily. Psychiatric/Behavioral: Negative for agitation, behavioral problems, hallucinations and sleep disturbance. The patient is not hyperactive. All other systems reviewed and are negative. Except as noted above the remainder of the review of systems was reviewed and negative. PAST MEDICAL HISTORY     Past Medical History:   Diagnosis Date    Acute kidney [facility] [address] [address] 10/3/2018    Congenital heart disease     Coronary atherosclerosis 9/8/2015    Hearing loss     HTN (hypertension) 9/8/2015    Hyperlipidemia 9/8/2015         SURGICALHISTORY       Past Surgical History:   Procedure Laterality Date    CARDIAC SURGERY      EYE SURGERY      JOINT REPLACEMENT      left shoulder, both knees    KNEE ARTHROPLASTY      [place] COLON CA [address]CRN NOT  [address]  IND N/A 10/17/2018    COLONOS[facility] performed by Dawne Sicard, MD at [facility]       Previous Medications    ASPIRIN (ASPIRIN 81) 81 MG CHEWABLE TABLET    Take 81 mg by mouth Daily    CARVEDILOL (COREG) 6.25 MG TABLET    Take 6.25 mg by mouth Daily    COENZYME Q10 (CO Q10) 100 MG CAPS    Take 100 mg by mouth daily    GLUCOSAMINE HCL 1500 MG TABS    Take 1,500 mg by mouth Daily with lunch    NAPROXEN SODIUM (ALEVE) 220 MG CAPS    Take by mouth    NITROGLYCERIN (NITROSTAT) 0.4 MG SL TABLET    Place 0.4 mg under the tongue every 5 minutes as needed for Chest pain up to max of 3 total doses. If no relief after 1 dose, call 911. PRAVASTATIN (PRAVACHOL) 40 MG TABLET    Take 40 mg by mouth daily       ALLERGIES     Patient has no known allergies.     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   Social Needs    Financial resource strain: None    Food insecurity:     Worry: None     Inability: None    Transportation needs:     Medical: None     Non-medical: None   Tobacco Use    Smoking status: Never Smoker    Smokeless tobacco: Current User     Types: Chew   Substance and Sexual Activity    Alcohol use: No    Drug use: No    Sexual activity: Never   Lifestyle    Physical activity:     Days per week: None     Minutes per session: None    Stress: None   Relationships    Social connections:     Talks on phone: None     Gets together: None     Attends Taoist service: None     Active member of club or organization: None     Attends meetings of clubs or organizations: None     Relationship status: None    Intimate partner violence:     Fear of current or ex partner: None     Emotionally abused: None     Physically abused: None     Forced sexual activity: None   Other Topics Concern    None   Social History Narrative    None       SCREENINGS      @FLOW([id number])@      PHYSICAL EXAM    (up to 7 for level 4, 8 or more for level 5)     ED Triage Vitals [05/08/19 1652]   BP Temp Temp Source Pulse Resp SpO2 Height Weight   (!) 188/95 98.2 °F (36.8 °C) Oral 75 16 95 % 5' 7.5\" (1.715 m) 225 lb (102.1 kg)       Physical Exam   Constitutional: He is oriented to person, place, and time. He appears well-developed and well-nourished. Ambulatory patient nontoxic slightly uncomfortable because of the flank pain   HENT:   Head: Normocephalic. Right Ear: External ear normal.   Left Ear: External ear normal.   Nose: Nose normal.   Mouth/Throat: Oropharynx is clear and moist.   Eyes: Pupils are equal, round, and reactive to light. Neck: Normal range of motion. Neck supple.    Cardiovascular: Normal rate, regular rhythm and normal heart sounds. Exam reveals no gallop. No murmur heard. Pulmonary/Chest: Breath sounds normal. No respiratory distress. He has no wheezes. Abdominal: Soft. Bowel sounds are normal. He exhibits no mass. There is no rebound. Musculoskeletal: Normal range of motion. He exhibits no edema or tenderness. Neurological: He is alert and oriented to person, place, and time. No cranial nerve deficit. He exhibits normal muscle tone. Skin: Skin is warm. No rash noted. No erythema. Psychiatric: His behavior is normal. Thought content normal.   Vitals reviewed. DIAGNOSTIC RESULTS     EKG: All EKG's are interpreted by the Emergency Department Physician who either signs or Co-signsthis chart in the absence of a cardiologist.        RADIOLOGY:   Leroy Ebbs such as CT, Ultrasound and MRI are read by the radiologist. Sara Luong radiographic images are visualized and preliminarily interpreted by the emergency physician with the below findings:        Interpretation per the Radiologist below, if available at the time ofthis note:    CT ABDOMEN PELVIS WO CONTRAST Additional Contrast? None   Final Result      Moderate right hydroureteronephrosis with right-sided perinephric stranding secondary to a 7 mm calculus within the proximal right ureter. Additional nonobstructing renal calculi bilaterally. Mild interval increase in size of a fusiform infrarenal abdominal aortic aneurysm measuring up to 3.5 cm in diameter. Continued surveillance recommended. 2.3 cm left adrenal nodule has not significantly changed since 2013 and likely relates to adenoma in this patient without a provided history of malignancy. Hepatic steatosis. Small hiatal hernia. Colonic diverticulosis without diverticulitis. Mild enlargement of the prostate gland.          All CT scans at this facility use dose modulation, iterative reconstruction, and/or weight based dosing when appropriate to reduce radiation dose to as low as reasonably achievable. ED BEDSIDE ULTRASOUND:   Performed by ED Physician - none    LABS:  Labs Reviewed   COMPREHENSIVE METABOLIC PANEL - Abnormal; Notable for the following components:       Result Value    BUN 26 (*)     CREATININE 1.34 (*)     GFR Non- 53.3 (*)     Total Protein 8.1 (*)     Total Bilirubin 1.3 (*)     Globulin 3.6 (*)     All other components within normal limits   CBC WITH AUTO DIFFERENTIAL - Abnormal; Notable for the following components:    WBC 13.3 (*)     Neutrophils # 11.3 (*)     All other components within normal limits   URINE RT REFLEX TO CULTURE       All other labs were within normal range or not returned as of this dictation. EMERGENCY DEPARTMENT COURSE and DIFFERENTIAL DIAGNOSIS/MDM:   Vitals:    Vitals:    05/08/19 1652 05/08/19 1800 05/08/19 1836   BP: (!) 188/95 135/64    Pulse: 75  68   Resp: 16  18   Temp: 98.2 °F (36.8 °C)  98.4 °F (36.9 °C)   TempSrc: Oral  Oral   SpO2: 95%  95%   Weight: 225 lb (102.1 kg)     Height: 5' 7.5\" (1.715 m)             MDM  Number of Diagnoses or Management Options  Flank pain:   Kidney stone:   Diagnosis management comments: Patient with known history of kidney stone last time patient requires some intervention and stent placement as per patient 7 pain to the right flank and patient has a 7 mm proximal stone at this time with hydronephrosis slightly better with the pain patient isn't known history of  abd aneurysm which is stable at this time nausea is better.   Discussed with the hospitalist [address] [place] [address] as patient will require some intervention by the urologist, DR Wesley Scott  Case  Discussed   Refuses to accept  And  Transfer  N discussed with the urologist on call Edna Cabrales who agrees with transfer and accepted the patient for transfer but requesting admission to the hospitalist, DR BRIGGS [facility]  consulted and who accepted the patient for transfer to [facility]        Amount and/or Complexity of Data Reviewed  Clinical lab tests: ordered and reviewed  Tests in the radiology section of CPT®: ordered and reviewed        CRITICAL CARE TIME   Total Critical Care time was minutes, excluding separately reportableprocedures. There was a high probability of clinicallysignificant/life threatening deterioration in the patient's condition which required my urgent intervention. CONSULTS:  None    PROCEDURES:  Unless otherwise noted below, none     Procedures    FINAL IMPRESSION      1. Kidney stone    2. Flank pain          DISPOSITION/PLAN   DISPOSITION        PATIENT REFERRED TO:  No follow-up provider specified.     DISCHARGE MEDICATIONS:  New Prescriptions    No medications on file          (Please note that portions of this note were completed with a voice recognition program.  Efforts were made to edit the dictations but occasionally words are mis-transcribed.)    Rodrigo Michel MD (electronically signed)  Attending Emergency Physician       Rodrigo Michel MD  05/08/19 5088

## 2019-05-08 NOTE — ED NOTES
[id number] [facility] hospitalist, Dr. Jagdish Alan, paged for Dr. Louann Simmonds.      Paul King  05/08/19 1925

## 2019-05-08 NOTE — ED NOTES
[facility] hospitalist, Dr. Michelle Bo, paged for Dr. Nereida Snyder.      [facility]  05/08/19 0717

## 2019-05-09 ENCOUNTER — ANESTHESIA EVENT (OUTPATIENT)
Dept: OPERATING ROOM | Age: 64
End: 2019-05-09
Payer: MEDICARE

## 2019-05-09 ENCOUNTER — ANESTHESIA (OUTPATIENT)
Dept: OPERATING ROOM | Age: 64
End: 2019-05-09
Payer: MEDICARE

## 2019-05-09 ENCOUNTER — APPOINTMENT (OUTPATIENT)
Dept: GENERAL RADIOLOGY | Age: 64
End: 2019-05-09
Attending: INTERNAL MEDICINE
Payer: MEDICARE

## 2019-05-09 ENCOUNTER — TELEPHONE (OUTPATIENT)
Dept: UROLOGY | Age: 64
End: 2019-05-09

## 2019-05-09 VITALS
RESPIRATION RATE: 14 BRPM | HEART RATE: 57 BPM | WEIGHT: 225 LBS | SYSTOLIC BLOOD PRESSURE: 136 MMHG | HEIGHT: 68 IN | BODY MASS INDEX: 34.1 KG/M2 | OXYGEN SATURATION: 96 % | TEMPERATURE: 98.6 F | DIASTOLIC BLOOD PRESSURE: 74 MMHG

## 2019-05-09 VITALS — SYSTOLIC BLOOD PRESSURE: 102 MMHG | DIASTOLIC BLOOD PRESSURE: 56 MMHG | OXYGEN SATURATION: 97 % | TEMPERATURE: 96.6 F

## 2019-05-09 DIAGNOSIS — N20.0 KIDNEY STONE: Primary | ICD-10-CM

## 2019-05-09 LAB
ALBUMIN SERPL-MCNC: 3.5 G/DL (ref 3.5–4.6)
ALP BLD-CCNC: 54 U/L (ref 35–104)
ALT SERPL-CCNC: 18 U/L (ref 0–41)
ANION GAP SERPL CALCULATED.3IONS-SCNC: 13 MEQ/L (ref 9–15)
AST SERPL-CCNC: 18 U/L (ref 0–40)
BASOPHILS ABSOLUTE: 0 K/UL (ref 0–0.2)
BASOPHILS RELATIVE PERCENT: 0.5 %
BILIRUB SERPL-MCNC: 1.1 MG/DL (ref 0.2–0.7)
BUN BLDV-MCNC: 24 MG/DL (ref 8–23)
CALCIUM SERPL-MCNC: 8.1 MG/DL (ref 8.5–9.9)
CHLORIDE BLD-SCNC: 106 MEQ/L (ref 95–107)
CO2: 22 MEQ/L (ref 20–31)
CREAT SERPL-MCNC: 1.39 MG/DL (ref 0.7–1.2)
EKG ATRIAL RATE: 65 BPM
EKG P AXIS: 31 DEGREES
EKG P-R INTERVAL: 190 MS
EKG Q-T INTERVAL: 420 MS
EKG QRS DURATION: 86 MS
EKG QTC CALCULATION (BAZETT): 436 MS
EKG R AXIS: -32 DEGREES
EKG T AXIS: 31 DEGREES
EKG VENTRICULAR RATE: 65 BPM
EOSINOPHILS ABSOLUTE: 0.2 K/UL (ref 0–0.7)
EOSINOPHILS RELATIVE PERCENT: 2.2 %
GFR AFRICAN AMERICAN: >60
GFR NON-AFRICAN AMERICAN: 51.1
GLOBULIN: 2.3 G/DL (ref 2.3–3.5)
GLUCOSE BLD-MCNC: 110 MG/DL (ref 70–99)
HCT VFR BLD CALC: 39 % (ref 42–52)
HEMOGLOBIN: 13.2 G/DL (ref 14–18)
LYMPHOCYTES ABSOLUTE: 1.2 K/UL (ref 1–4.8)
LYMPHOCYTES RELATIVE PERCENT: 13.1 %
MCH RBC QN AUTO: 31.8 PG (ref 27–31.3)
MCHC RBC AUTO-ENTMCNC: 33.7 % (ref 33–37)
MCV RBC AUTO: 94.5 FL (ref 80–100)
MONOCYTES ABSOLUTE: 0.9 K/UL (ref 0.2–0.8)
MONOCYTES RELATIVE PERCENT: 10.6 %
NEUTROPHILS ABSOLUTE: 6.5 K/UL (ref 1.4–6.5)
NEUTROPHILS RELATIVE PERCENT: 73.6 %
PDW BLD-RTO: 14.1 % (ref 11.5–14.5)
PLATELET # BLD: 227 K/UL (ref 130–400)
POTASSIUM REFLEX MAGNESIUM: 3.9 MEQ/L (ref 3.4–4.9)
RBC # BLD: 4.13 M/UL (ref 4.7–6.1)
SODIUM BLD-SCNC: 141 MEQ/L (ref 135–144)
TOTAL PROTEIN: 5.8 G/DL (ref 6.3–8)
WBC # BLD: 8.9 K/UL (ref 4.8–10.8)

## 2019-05-09 PROCEDURE — C1758 CATHETER, URETERAL: HCPCS | Performed by: UROLOGY

## 2019-05-09 PROCEDURE — 36415 COLL VENOUS BLD VENIPUNCTURE: CPT

## 2019-05-09 PROCEDURE — 6360000002 HC RX W HCPCS: Performed by: NURSE ANESTHETIST, CERTIFIED REGISTERED

## 2019-05-09 PROCEDURE — 3700000001 HC ADD 15 MINUTES (ANESTHESIA): Performed by: UROLOGY

## 2019-05-09 PROCEDURE — 85025 COMPLETE CBC W/AUTO DIFF WBC: CPT

## 2019-05-09 PROCEDURE — 7100000000 HC PACU RECOVERY - FIRST 15 MIN: Performed by: UROLOGY

## 2019-05-09 PROCEDURE — 7100000001 HC PACU RECOVERY - ADDTL 15 MIN: Performed by: UROLOGY

## 2019-05-09 PROCEDURE — C1894 INTRO/SHEATH, NON-LASER: HCPCS | Performed by: UROLOGY

## 2019-05-09 PROCEDURE — 2580000003 HC RX 258: Performed by: INTERNAL MEDICINE

## 2019-05-09 PROCEDURE — 2580000003 HC RX 258: Performed by: UROLOGY

## 2019-05-09 PROCEDURE — 2580000003 HC RX 258: Performed by: NURSE ANESTHETIST, CERTIFIED REGISTERED

## 2019-05-09 PROCEDURE — 74018 RADEX ABDOMEN 1 VIEW: CPT

## 2019-05-09 PROCEDURE — 99204 OFFICE O/P NEW MOD 45 MIN: CPT | Performed by: UROLOGY

## 2019-05-09 PROCEDURE — 3700000000 HC ANESTHESIA ATTENDED CARE: Performed by: UROLOGY

## 2019-05-09 PROCEDURE — 6360000002 HC RX W HCPCS: Performed by: INTERNAL MEDICINE

## 2019-05-09 PROCEDURE — 6370000000 HC RX 637 (ALT 250 FOR IP): Performed by: INTERNAL MEDICINE

## 2019-05-09 PROCEDURE — G0378 HOSPITAL OBSERVATION PER HR: HCPCS

## 2019-05-09 PROCEDURE — 93010 ELECTROCARDIOGRAM REPORT: CPT | Performed by: INTERNAL MEDICINE

## 2019-05-09 PROCEDURE — 52332 CYSTOSCOPY AND TREATMENT: CPT | Performed by: UROLOGY

## 2019-05-09 PROCEDURE — 3600000004 HC SURGERY LEVEL 4 BASE: Performed by: UROLOGY

## 2019-05-09 PROCEDURE — 6370000000 HC RX 637 (ALT 250 FOR IP): Performed by: UROLOGY

## 2019-05-09 PROCEDURE — 2709999900 HC NON-CHARGEABLE SUPPLY: Performed by: UROLOGY

## 2019-05-09 PROCEDURE — 93005 ELECTROCARDIOGRAM TRACING: CPT

## 2019-05-09 PROCEDURE — 3209999900 FLUORO FOR SURGICAL PROCEDURES

## 2019-05-09 PROCEDURE — 6360000002 HC RX W HCPCS: Performed by: UROLOGY

## 2019-05-09 PROCEDURE — 52330 CYSTOSCOPY AND TREATMENT: CPT | Performed by: UROLOGY

## 2019-05-09 PROCEDURE — C1769 GUIDE WIRE: HCPCS | Performed by: UROLOGY

## 2019-05-09 PROCEDURE — 2500000003 HC RX 250 WO HCPCS: Performed by: NURSE ANESTHETIST, CERTIFIED REGISTERED

## 2019-05-09 PROCEDURE — 3600000014 HC SURGERY LEVEL 4 ADDTL 15MIN: Performed by: UROLOGY

## 2019-05-09 PROCEDURE — C2617 STENT, NON-COR, TEM W/O DEL: HCPCS

## 2019-05-09 PROCEDURE — 6360000004 HC RX CONTRAST MEDICATION: Performed by: UROLOGY

## 2019-05-09 PROCEDURE — 80053 COMPREHEN METABOLIC PANEL: CPT

## 2019-05-09 RX ORDER — HYDROCODONE BITARTRATE AND ACETAMINOPHEN 5; 325 MG/1; MG/1
1 TABLET ORAL PRN
Status: DISCONTINUED | OUTPATIENT
Start: 2019-05-09 | End: 2019-05-09 | Stop reason: HOSPADM

## 2019-05-09 RX ORDER — ONDANSETRON 2 MG/ML
4 INJECTION INTRAMUSCULAR; INTRAVENOUS
Status: DISCONTINUED | OUTPATIENT
Start: 2019-05-09 | End: 2019-05-09 | Stop reason: HOSPADM

## 2019-05-09 RX ORDER — FENTANYL CITRATE 50 UG/ML
INJECTION, SOLUTION INTRAMUSCULAR; INTRAVENOUS PRN
Status: DISCONTINUED | OUTPATIENT
Start: 2019-05-09 | End: 2019-05-09 | Stop reason: SDUPTHER

## 2019-05-09 RX ORDER — DEXAMETHASONE SODIUM PHOSPHATE 10 MG/ML
INJECTION INTRAMUSCULAR; INTRAVENOUS PRN
Status: DISCONTINUED | OUTPATIENT
Start: 2019-05-09 | End: 2019-05-09 | Stop reason: SDUPTHER

## 2019-05-09 RX ORDER — SODIUM CHLORIDE, SODIUM LACTATE, POTASSIUM CHLORIDE, CALCIUM CHLORIDE 600; 310; 30; 20 MG/100ML; MG/100ML; MG/100ML; MG/100ML
INJECTION, SOLUTION INTRAVENOUS CONTINUOUS PRN
Status: DISCONTINUED | OUTPATIENT
Start: 2019-05-09 | End: 2019-05-09 | Stop reason: SDUPTHER

## 2019-05-09 RX ORDER — CHLORHEXIDINE GLUCONATE 4 G/100ML
SOLUTION TOPICAL PRN
Status: DISCONTINUED | OUTPATIENT
Start: 2019-05-09 | End: 2019-05-09 | Stop reason: ALTCHOICE

## 2019-05-09 RX ORDER — FENTANYL CITRATE 50 UG/ML
50 INJECTION, SOLUTION INTRAMUSCULAR; INTRAVENOUS EVERY 10 MIN PRN
Status: DISCONTINUED | OUTPATIENT
Start: 2019-05-09 | End: 2019-05-09 | Stop reason: HOSPADM

## 2019-05-09 RX ORDER — METOCLOPRAMIDE HYDROCHLORIDE 5 MG/ML
10 INJECTION INTRAMUSCULAR; INTRAVENOUS
Status: DISCONTINUED | OUTPATIENT
Start: 2019-05-09 | End: 2019-05-09 | Stop reason: HOSPADM

## 2019-05-09 RX ORDER — CIPROFLOXACIN 500 MG/1
500 TABLET, FILM COATED ORAL 2 TIMES DAILY
Qty: 20 TABLET | Refills: 0 | Status: SHIPPED | OUTPATIENT
Start: 2019-05-09 | End: 2019-05-19

## 2019-05-09 RX ORDER — DOXAZOSIN MESYLATE 4 MG/1
4 TABLET ORAL NIGHTLY
Qty: 30 TABLET | Refills: 0 | Status: SHIPPED | OUTPATIENT
Start: 2019-05-09

## 2019-05-09 RX ORDER — DIPHENHYDRAMINE HYDROCHLORIDE 50 MG/ML
12.5 INJECTION INTRAMUSCULAR; INTRAVENOUS
Status: DISCONTINUED | OUTPATIENT
Start: 2019-05-09 | End: 2019-05-09 | Stop reason: HOSPADM

## 2019-05-09 RX ORDER — PROPOFOL 10 MG/ML
INJECTION, EMULSION INTRAVENOUS PRN
Status: DISCONTINUED | OUTPATIENT
Start: 2019-05-09 | End: 2019-05-09 | Stop reason: SDUPTHER

## 2019-05-09 RX ORDER — CIPROFLOXACIN 2 MG/ML
400 INJECTION, SOLUTION INTRAVENOUS EVERY 12 HOURS
Status: DISCONTINUED | OUTPATIENT
Start: 2019-05-09 | End: 2019-05-09 | Stop reason: HOSPADM

## 2019-05-09 RX ORDER — LIDOCAINE HYDROCHLORIDE 20 MG/ML
INJECTION, SOLUTION INFILTRATION; PERINEURAL PRN
Status: DISCONTINUED | OUTPATIENT
Start: 2019-05-09 | End: 2019-05-09 | Stop reason: SDUPTHER

## 2019-05-09 RX ORDER — ONDANSETRON 2 MG/ML
INJECTION INTRAMUSCULAR; INTRAVENOUS PRN
Status: DISCONTINUED | OUTPATIENT
Start: 2019-05-09 | End: 2019-05-09 | Stop reason: SDUPTHER

## 2019-05-09 RX ORDER — MAGNESIUM HYDROXIDE 1200 MG/15ML
LIQUID ORAL PRN
Status: DISCONTINUED | OUTPATIENT
Start: 2019-05-09 | End: 2019-05-09 | Stop reason: ALTCHOICE

## 2019-05-09 RX ORDER — MEPERIDINE HYDROCHLORIDE 25 MG/ML
12.5 INJECTION INTRAMUSCULAR; INTRAVENOUS; SUBCUTANEOUS EVERY 5 MIN PRN
Status: DISCONTINUED | OUTPATIENT
Start: 2019-05-09 | End: 2019-05-09 | Stop reason: HOSPADM

## 2019-05-09 RX ORDER — HYDROCODONE BITARTRATE AND ACETAMINOPHEN 5; 325 MG/1; MG/1
2 TABLET ORAL PRN
Status: DISCONTINUED | OUTPATIENT
Start: 2019-05-09 | End: 2019-05-09 | Stop reason: HOSPADM

## 2019-05-09 RX ADMIN — KETOROLAC TROMETHAMINE 15 MG: 15 INJECTION, SOLUTION INTRAMUSCULAR; INTRAVENOUS at 03:52

## 2019-05-09 RX ADMIN — DEXAMETHASONE SODIUM PHOSPHATE 10 MG: 10 INJECTION INTRAMUSCULAR; INTRAVENOUS at 12:39

## 2019-05-09 RX ADMIN — HYDROMORPHONE HYDROCHLORIDE 0.5 MG: 1 INJECTION, SOLUTION INTRAMUSCULAR; INTRAVENOUS; SUBCUTANEOUS at 05:20

## 2019-05-09 RX ADMIN — LIDOCAINE HYDROCHLORIDE 5 ML: 20 INJECTION, SOLUTION INFILTRATION; PERINEURAL at 12:27

## 2019-05-09 RX ADMIN — SODIUM CHLORIDE: 9 INJECTION, SOLUTION INTRAVENOUS at 13:29

## 2019-05-09 RX ADMIN — CARVEDILOL 6.25 MG: 6.25 TABLET, FILM COATED ORAL at 09:57

## 2019-05-09 RX ADMIN — PROPOFOL 150 MG: 10 INJECTION, EMULSION INTRAVENOUS at 12:27

## 2019-05-09 RX ADMIN — CIPROFLOXACIN 400 MG: 2 INJECTION, SOLUTION INTRAVENOUS at 06:17

## 2019-05-09 RX ADMIN — SODIUM CHLORIDE: 9 INJECTION, SOLUTION INTRAVENOUS at 03:52

## 2019-05-09 RX ADMIN — FENTANYL CITRATE 25 MCG: 50 INJECTION, SOLUTION INTRAMUSCULAR; INTRAVENOUS at 12:31

## 2019-05-09 RX ADMIN — SODIUM CHLORIDE, POTASSIUM CHLORIDE, SODIUM LACTATE AND CALCIUM CHLORIDE: 600; 310; 30; 20 INJECTION, SOLUTION INTRAVENOUS at 12:22

## 2019-05-09 RX ADMIN — PROPOFOL 50 MG: 10 INJECTION, EMULSION INTRAVENOUS at 12:28

## 2019-05-09 RX ADMIN — SODIUM CHLORIDE 1000 ML: 9 INJECTION, SOLUTION INTRAVENOUS at 11:06

## 2019-05-09 RX ADMIN — ONDANSETRON 4 MG: 2 INJECTION INTRAMUSCULAR; INTRAVENOUS at 12:39

## 2019-05-09 ASSESSMENT — PULMONARY FUNCTION TESTS
PIF_VALUE: 4
PIF_VALUE: 19
PIF_VALUE: 1
PIF_VALUE: 12
PIF_VALUE: 11
PIF_VALUE: 1
PIF_VALUE: 2
PIF_VALUE: 4
PIF_VALUE: 2
PIF_VALUE: 11
PIF_VALUE: 11
PIF_VALUE: 3
PIF_VALUE: 2
PIF_VALUE: 1
PIF_VALUE: 1
PIF_VALUE: 11
PIF_VALUE: 11
PIF_VALUE: 1
PIF_VALUE: 12
PIF_VALUE: 11
PIF_VALUE: 4
PIF_VALUE: 11
PIF_VALUE: 3
PIF_VALUE: 12
PIF_VALUE: 11
PIF_VALUE: 2
PIF_VALUE: 4

## 2019-05-09 ASSESSMENT — PAIN SCALES - GENERAL
PAINLEVEL_OUTOF10: 4
PAINLEVEL_OUTOF10: 7

## 2019-05-09 NOTE — PROGRESS NOTES
Pt medicated with toradol for pain  Pt states his pain is well controlled now,  Urine being strained  No stone fragments present  Pt afebrile on shift

## 2019-05-09 NOTE — H&P
Internal Medicine   History and Physical    Patient's Name/Date of Birth: Napoleon Brooklyn / 1955 (54 y.o.)    Date: May 8, 2019     Chief Complaint: Rt flank pain    HPI:     69 y/o male with PMH of CAD, HTN, HLD, presented to the ED at [facility] with right flank pain. Pain started at 1 PM earlier today and followed with multiple episodes of nausea and vomiting. He has history of recurrent kidney stones. CT scan at [facility] showed 7 mm stone obstructing the right ureter. This pain is more controlled currently. He denied passing the stone since arrival to the ED. He denied nausea or vomiting currently. No abdominal pain. No blood in the urine noted. Past Medical History:   Diagnosis Date    Acute kidney injury ([address] 10/3/2018    Congenital heart disease     Coronary atherosclerosis 9/8/2015    Hearing loss     HTN (hypertension) 9/8/2015    Hyperlipidemia 9/8/2015       Past Surgical History:   Procedure Laterality Date    CARDIAC SURGERY      EYE SURGERY      JOINT REPLACEMENT      left shoulder, both knees    KNEE ARTHROPLASTY      IA COLON CA SCRN NOT  [address] [place] [address] IND N/A 10/17/2018    COLONOSCOPY performed by Celeste Gupta MD at [facility]       Prior to Admission medications    Medication Sig Start Date End Date Taking?  Authorizing Provider   Naproxen Sodium (ALEVE) 220 MG CAPS Take by mouth   Yes Historical Provider, MD   aspirin (ASPIRIN 81) 81 MG chewable tablet Take 81 mg by mouth Daily   Yes Historical Provider, MD   carvedilol (COREG) 6.25 MG tablet Take 6.25 mg by mouth Daily   Yes Historical Provider, MD   Glucosamine HCl 1500 MG TABS Take 1,500 mg by mouth Daily with lunch   Yes Historical Provider, MD   Coenzyme Q10 (CO Q10) 100 MG CAPS Take 100 mg by mouth daily   Yes Historical Provider, MD   pravastatin (PRAVACHOL) 40 MG tablet Take 40 mg by mouth daily   Yes Historical Provider, MD   nitroGLYCERIN (NITROSTAT) 0.4 MG SL tablet Place 0.4 mg under the tongue every 5 minutes as needed for Chest pain up to max of 3 total doses. If no relief after 1 dose, call 911.     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   Social Needs    Financial resource strain: Not on file    Food insecurity:     Worry: Not on file     Inability: Not on file   [employer] needs:     Medical: Not on file     Non-medical: Not on file   Tobacco Use    Smoking status: Never Smoker    Smokeless tobacco: Current User     Types: Chew   Substance and Sexual Activity    Alcohol use: No    Drug use: No    Sexual activity: Never   Lifestyle    Physical activity:     Days per week: Not on file     Minutes per session: Not on file    Stress: Not on file   Relationships    Social connections:     Talks on phone: Not on file     Gets together: Not on file     Attends Temple service: Not on file     Active member of club or organization: Not on file     Attends meetings of clubs or organizations: Not on file     Relationship status: Not on file    Intimate partner violence:     Fear of current or ex partner: Not on file     Emotionally abused: Not on file     Physically abused: Not on file     Forced sexual activity: Not on file   Other Topics Concern    Not on file   Social History Narrative    Not on file       Review of Systems:   CONSTITUTIONAL:  negative for  fevers, chills, sweats, fatigue, malaise, anorexia and weight loss  EYES:  negative for  double vision, blurred vision, dry eyes, blind spots, eye discharge, visual disturbance  HEENT:  negative for  hearing loss, tinnitus, ear drainage,epistaxis, snoring,  sore throat  RESPIRATORY:  negative for  dry cough, cough with sputum, dyspnea, wheezing, hemoptysis, chest pain, pleuritic pain and cyanosis  CARDIOVASCULAR:  negative for  chest pain, dyspnea, palpitations, orthopnea, PND, exertional chest pressure/discomfort, fatigue, early saiety, edema, syncope  GASTROINTESTINAL:  Positive for nausea, vomiting, no change in bowel habits, diarrhea, constipation, Rt flank pain'  GENITOURINARY:  negative for frequency, dysuria, nocturia, urinary incontinence, hesitancy, decreased stream and hematuria  INTEGUMENT:  negative for rash, skin lesions  HEMATOLOGIC/LYMPHATIC:  negative for easy bruising, bleeding, lymphadenopathy, petechiae and swelling/edema  ALLERGIC/IMMUNOLOGIC:  negative for recurrent infections, urticaria, hay fever, angioedema, anaphylaxis and drug reactions  ENDOCRINE:  negative for heat intolerance, cold intolerance, tremor, weight changes   MUSCULOSKELETAL:  negative for  myalgias, arthralgias, pain, joint swelling, stiff joints, decreased range of motion, muscle weakness and bone pain  NEUROLOGICAL:  Negative for weakness and sensory problems  BEHAVIOR/PSYCH:  negative for poor appetite, decreased energy level, depressed mood,  fatigue, agitated, increased agitation and anxiety    Physical Exam:  Vitals:    05/08/19 2143 05/08/19 2145 05/08/19 2237   BP: (!) 157/75     Pulse: 67 67    Resp: 14     Temp: 97.7 °F (36.5 °C)     TempSrc: Oral     SpO2: 96%     Weight:   225 lb (102.1 kg)   Height:   5' 7.5\" (1.715 m)       CONSTITUTIONAL:  awake, alert, cooperative, no apparent distress, and appears stated age  EYES:  Lids and lashes normal, pupils equal, round and reactive to light   ENT:  Normocephalic, without obvious abnormality, atraumatic, sinuses nontender on palpation   NECK:  Supple, symmetrical, trachea midline, no adenopathy   HEMATOLOGIC/LYMPHATICS:  no cervical lymphadenopathy and no supraclavicular lymphadenopathy  BACK:  Symmetric, no curvature, spinous processes are non-tender on palpation   LUNGS:  No increased work of breathing, good air exchange, clear to auscultation bilaterally, no crackles or wheezing  CARDIOVASCULAR:  Normal apical impulse, regular rate and rhythm, normal S1 and S2, no S3 or S4, and no murmur noted  ABDOMEN:  No scars, normal bowel sounds, soft, non-distended, non-tender, no masses palpated, no hepatosplenomegally  CHEST: no masses palpated, no axillary or supraclavicular adenopathy  MUSCULOSKELETAL:  There is no redness, warmth, or swelling of the joints. Full range of motion noted. Motor strength is 5 out of 5 all extremities bilaterally. Tone is normal.  NEUROLOGIC:  Awake, alert, oriented to name, place and time. Cranial nerves II-XII are grossly intact.      SKIN:  no bruising or bleeding, normal skin color, texture, turgor, no redness, warmth, or swelling, no rashes     Labs:  Recent Results (from the past 24 hour(s))   Comprehensive Metabolic Panel    Collection Time: 05/08/19  5:05 PM   Result Value Ref Range    Sodium 141 135 - 144 mEq/L    Potassium 4.8 3.4 - 4.9 mEq/L    Chloride 102 95 - 107 mEq/L    CO2 24 20 - 31 mEq/L    Anion Gap 15 9 - 15 mEq/L    Glucose 97 70 - 99 mg/dL    BUN 26 (H) 8 - 23 mg/dL    CREATININE 1.34 (H) 0.70 - 1.20 mg/dL    GFR Non-African American 53.3 (L) >60    GFR  >60.0 >60    Calcium 9.6 8.5 - 9.9 mg/dL    Total Protein 8.1 (H) 6.3 - 8.0 g/dL    Alb 4.5 3.5 - 4.6 g/dL    Total Bilirubin 1.3 (H) 0.2 - 0.7 mg/dL    Alkaline Phosphatase 70 35 - 104 U/L    ALT 25 0 - 41 U/L    AST 30 0 - 40 U/L    Globulin 3.6 (H) 2.3 - 3.5 g/dL   CBC Auto Differential    Collection Time: 05/08/19  5:05 PM   Result Value Ref Range    WBC 13.3 (H) 4.8 - 10.8 K/uL    RBC 5.04 4.70 - 6.10 M/uL    Hemoglobin 15.7 14.0 - 18.0 g/dL    Hematocrit 47.2 42.0 - 52.0 %    MCV 93.6 80.0 - 100.0 fL    MCH 31.1 27.0 - 31.3 pg    MCHC 33.2 33.0 - 37.0 %    RDW 13.8 11.5 - 14.5 %    Platelets 818 130 - 400 K/uL    Neutrophils % 85.0 %    Lymphocytes % 9.7 %    Monocytes % 4.4 %    Eosinophils % 0.7 %    Basophils % 0.2 %    Neutrophils # 11.3 (H) 1.4 - 6.5 K/uL    Lymphocytes # 1.3 1.0 - 4.8 K/uL    Monocytes # 0.6 0.2 - 0.8 K/uL    Eosinophils # 0.1 0.0 - 0.7 K/uL    Basophils # 0.0 0.0 - 0.2 K/uL   Urine Reflex to Culture    Collection Time: 05/08/19  7:47 PM   Result Value Ref Range    Color, UA Yellow Straw/Yellow    Clarity, UA Clear Clear    Glucose, Ur Negative Negative mg/dL    Bilirubin Urine Negative Negative    Ketones, Urine 15 (A) Negative mg/dL    Specific Gravity, UA >=1.030 1.005 - 1.030    Blood, Urine Large Negative    pH, UA 5.0 5.0 - 9.0    Protein,  (A) Negative mg/dL    Urobilinogen, Urine 0.2 <2.0 E.U./dL    Nitrite, Urine Negative Negative    Leukocyte Esterase, Urine Negative Negative    Urine Reflex to Culture YES    Microscopic Urinalysis    Collection Time: 05/08/19  7:51 PM   Result Value Ref Range    WBC, UA 0-2 0 - 5 /HPF    RBC, UA >100 (A) 0 - 2 /HPF    Epi Cells 0-2 /HPF    Bacteria, UA Rare /HPF     Radiology:  No orders to display       Assessment/Plan:  1. Obstructing kidney stone   7 mm on CT scan obstructing right ureter and multiple other stones in the kidneys   Patient is not septic   History of recurrent stones. Previously patient required stent to system passing stones   Urology was contacted at [facility] in the advised admission for possible stent in the morning   Will start patient on doxazosin and IVF   Will monitor if patient passes stones    Toradol for pain  2. Mild Leukocytosis   Likely reactive    No sign of infection  3. CKD   Cr at baseline   Likely due to recurrent obstructing kidney stones   4.  HTN   Will resume home meds   Control pain   On doxazosin currently    Veronica Salazar   Seen on 05/08/19

## 2019-05-09 NOTE — DISCHARGE SUMMARY
Hospital Medicine Discharge Summary    Deep Gould  :  1955  MRN:  [id number]    Admit date:  2019  Discharge date:  2019    Admitting Physician: Jessica Knox MD  Primary Care Physician:  Aishwarya Chang MD    Deep Gould is a 70 y.o. male that was admitted and treated at [facility] for the following medical issues: Active Problems:    Kidney stone  Resolved Problems:    * No resolved hospital problems. *      Discharge Diagnoses: Active Problems:    Kidney stone  Resolved Problems:    * No resolved hospital problems. *    No chief complaint on file. Hospital Course:   Deep Golud is a 70 y.o. male who was admitted to the hospital with flank pain secondary to renal stone. 7 mm in size. Stent inserted by urology. Sclerae discharge the patient follow-up lithotripsy scheduled for May 15  Pt was discharge in a stable condition. BP (!) 143/81   Pulse 56   Temp 98.6 °F (37 °C) (Oral)   Resp 14   Ht 5' 7.5\" (1.715 m)   Wt 225 lb (102.1 kg)   SpO2 96%   BMI 34.72 kg/m²     Patient was seen by the following consultants while admitted to [facility]:   Consults:  IP CONSULT TO UROLOGY    Significant Diagnostic Studies:    Ct Abdomen Pelvis Wo Contrast Additional Contrast? None    Result Date: 2019  EXAM: CT of the abdomen and pelvis without contrast History: Right flank pain and right lower quadrant pain Technique: Multiple contiguous axial images were obtained of the abdomen and pelvis from an level of the lung bases through the initial tuberosities without IV contrast. Multiplanar reformats were obtained. Comparison: CT abdomen pelvis from 2013 Findings: Minimal left lung base subsegmental atelectasis. Multiple small calcifications throughout the liver and spleen likely sequela of prior granulomatous disease. Small hiatal hernia. Diffuse hypoattenuation of the liver without focal hepatic lesion.  The unenhanced gallbladder, pancreas, and right adrenal gland are within normal limits. A 2.3 cm left adrenal nodule with Hounsfield units of approximately 12 is identified and is only minimally increased in size when compared to July 1, 2013 CT likely relating to adrenal adenoma in this patient without provided history of malignancy. A 3.4 cm cyst arises from the superior pole of the right kidney. A 1 cm cyst arises from the inferior pole of the left kidney. 4 nonobstructing left renal calculi are identified measuring up to approximately 3 mm. Approximately 8 mm nonobstructing right renal calculi identified, largest measuring approximately 10 mm. There is moderate right-sided hydroureteronephrosis with right-sided perinephric stranding secondary to a 7 mm calculus within the proximal right ureter. Urinary bladder is well distended. Prostate is mildly enlarged. Fusiform aneurysm of the infrarenal abdominal aorta for a length of approximately 5 cm with a diameter of up to 3.5 cm (previously with a diameter of up to 3 cm). No retroperitoneal or abdominal/pelvic lymphadenopathy. No small bowel obstruction. Colonic diverticuli are identified. No overt colonic mass or pericolonic inflammation. Appendix is within normal limits. No free fluid or free air. Advanced degenerative changes of the spine. Moderate right hydroureteronephrosis with right-sided perinephric stranding secondary to a 7 mm calculus within the proximal right ureter. Additional nonobstructing renal calculi bilaterally. Mild interval increase in size of a fusiform infrarenal abdominal aortic aneurysm measuring up to 3.5 cm in diameter. Continued surveillance recommended. 2.3 cm left adrenal nodule has not significantly changed since 2013 and likely relates to adenoma in this patient without a provided history of malignancy. Hepatic steatosis. Small hiatal hernia. Colonic diverticulosis without diverticulitis. Mild enlargement of the prostate gland.  All CT scans at this facility use dose modulation, iterative reconstruction, and/or weight based dosing when appropriate to reduce radiation dose to as low as reasonably achievable. Xr Abdomen (kub) (single Ap View)    Result Date: 5/9/2019  EXAMINATION: XR ABDOMEN (KUB) (SINGLE AP VIEW) CLINICAL HISTORY: RIGHT KIDNEY STONES. FOLLOW-UP. COMPARISONS: NO PRIOR IMAGING AVAILABLE FOR COMPARISON. FINDINGS: 1 cm calcification overlies the upper pole right kidney with 1 mm calcification identified overlying the lower pole. 2 calcifications, measuring less than 1 mm lie just caudally to this finding. Left kidney shows 2 mid polar calcifications, largest measuring 3 mm. 1 mm calcification is demonstrated overlying the lower pole. 4 mm calcification lies just lateral to the psoas muscle at the L3 level on the right with a 1 mm calcification just cephalad to it. Multiple rounded calcifications are identified in the caudal pelvic inlet bilaterally. Gas and stool in colon. No diffuse small bowel dilatation. No mass effect. Diffuse degenerative change lumbar spine, with degenerative change also visualized involving bilateral hips. BILATERAL RENAL CALCULI AS DESCRIBED. POSSIBLE RIGHT URETERAL CALCULUS. PROBABLE PELVIC PHLEBOLITHS. DEGENERATIVE CHANGE LUMBAR SPINE AND BILATERAL HIPS. Fluoro For Surgical Procedures    Result Date: 5/9/2019  Interoperative fluoroscopy. HISTORY: Cystoscopy stent placement. FINDINGS: 33.7 seconds fluoroscopy time. 3 images. Placement of double-J ureteral stent with caudal portion coiled in urinary bladder, cephalad portion coiled within the collecting system. Intraoperative fluoroscopy as discussed.       Discharge Medications:       [facility]   Home Medication Instructions WHO:[id number]    Printed on:05/09/19 9045   Medication Information                      aspirin (ASPIRIN 81) 81 MG chewable tablet  Take 81 mg by mouth Daily             carvedilol (COREG) 6.25 MG tablet  Take 6.25 mg by mouth Daily             ciprofloxacin (CIPRO) 500 MG tablet  Take 1 tablet by mouth 2 times daily for 10 days             Coenzyme Q10 (CO Q10) 100 MG CAPS  Take 100 mg by mouth daily             doxazosin (CARDURA) 4 MG tablet  Take 1 tablet by mouth nightly             Glucosamine HCl 1500 MG TABS  Take 1,500 mg by mouth Daily with lunch             Naproxen Sodium (ALEVE) 220 MG CAPS  Take by mouth             nitroGLYCERIN (NITROSTAT) 0.4 MG SL tablet  Place 0.4 mg under the tongue every 5 minutes as needed for Chest pain up to max of 3 total doses. If no relief after 1 dose, call 911. pravastatin (PRAVACHOL) 40 MG tablet  Take 40 mg by mouth daily                 Disposition:   If discharged to Home, Any [facility] needs that were indicated and/or required as been addressed and set up by Social Work. Condition at discharge: Pt was medically stable at the time of discharge. Activity: activity as tolerated    Total time taken for discharging this patient: 40 minutes. Greater than 70% of time was spent focused exclusively on this patient. Time was taken to review chart, discuss plans with consultants, reconciling medications, discussing plan answering questions with patient.      Signed:  Toni Mcburney  5/9/2019, 6:54 PM

## 2019-05-09 NOTE — ANESTHESIA PRE PROCEDURE
last liquid consumption: 0500                        Time of last solid consumption: 1700                        Date of last liquid consumption: 05/09/19                        Date of last solid food consumption: 05/07/19    BMI:   Wt Readings from Last 3 Encounters:   05/08/19 225 lb (102.1 kg)   05/08/19 225 lb (102.1 kg)   11/28/18 234 lb (106.1 kg)     Body mass index is 34.72 kg/m². CBC:   Lab Results   Component Value Date    WBC 8.9 05/09/2019    RBC 4.13 05/09/2019    HGB 13.2 05/09/2019    HCT 39.0 05/09/2019    MCV 94.5 05/09/2019    RDW 14.1 05/09/2019     05/09/2019       CMP:   Lab Results   Component Value Date     05/09/2019    K 3.9 05/09/2019     05/09/2019    CO2 22 05/09/2019    BUN 24 05/09/2019    CREATININE 1.39 05/09/2019    GFRAA >60.0 05/09/2019    LABGLOM 51.1 05/09/2019    GLUCOSE 110 05/09/2019    PROT 5.8 05/09/2019    CALCIUM 8.1 05/09/2019    BILITOT 1.1 05/09/2019    ALKPHOS 54 05/09/2019    AST 18 05/09/2019    ALT 18 05/09/2019       POC Tests: No results for input(s): POCGLU, POCNA, POCK, POCCL, POCBUN, POCHEMO, POCHCT in the last 72 hours.     Coags: No results found for: PROTIME, INR, APTT    HCG (If Applicable): No results found for: PREGTESTUR, PREGSERUM, HCG, HCGQUANT     ABGs: No results found for: PHART, PO2ART, CSG7QSX, JCE8YBC, BEART, G4RIMDHX     Type & Screen (If Applicable):  No results found for: LABABO, [address]    Anesthesia Evaluation  Patient summary reviewed and Nursing notes reviewed no history of anesthetic complications:   Airway: Mallampati: II  TM distance: >3 FB   Neck ROM: full  Mouth opening: > = 3 FB Dental: normal exam         Pulmonary:Negative Pulmonary ROS and normal exam  breath sounds clear to auscultation                             Cardiovascular:  Exercise tolerance: good (>4 METS),   (+) hypertension:, CAD:, CABG/stent:, hyperlipidemia      ECG reviewed  Rhythm: regular  Rate: normal           Beta Blocker:  Dose within 24 Hrs         Neuro/Psych:   Negative Neuro/Psych ROS              GI/Hepatic/Renal: Neg GI/Hepatic/Renal ROS  (+) renal disease: ARF,           Endo/Other:    (+) blood dyscrasia: anemia:., electrolyte abnormalities, . Pt had PAT visit. Abdominal:           Vascular: negative vascular ROS. Anesthesia Plan      general     ASA 3     (LMA)  Induction: intravenous. MIPS: Postoperative opioids intended and Prophylactic antiemetics administered. Plan discussed with CRNA.     Attending anesthesiologist reviewed and agrees with Paras Pittman DO   5/9/2019

## 2019-05-09 NOTE — PROGRESS NOTES
PT ADMITTED FOR RIGHT KIDNEY STONE.  TODAY HE HAD A RIGHT DOUBLE J STENT PLACED. POST OP RECOVERY WENT WELL. DISCHARGE INSTRUCTIONS GIVEN TO PT INCLUDING BUT NOT LIMITED TO THE SPECIAL INSTRUCTIONS REGARDING HIS APTS NEXT WEEK ON THE 14TH AND THE 15TH. HE WAS ALSO INSTRUCTED TO HOLD NSAIDS, ASA AND ALL VITAMINS. PT WAS ALSO GIVEN A LEXICOMP HAND OUT FOR URETERAL STENT AND FOR THE LITHOTRIPSY COMING UP THIS WEEK. HIS WIFE ARRIVED AT 1930 AND PT WAS ESCORTED TO HOSPITAL FRONT ENTRANCE IN A [address] STAFF.

## 2019-05-09 NOTE — CARE COORDINATION
MET WITH PATIENT,FREEDOM OF CHOICE OFFERED. PATIENT DECLINES DC NEEDS AT THIS TIME. STATES HOME INDEPENDENT. PROBABLE HOME LATER TODAY PER DR. Ronn Zacarias. OK TO DC PER DR. VILLANUEVA [facility]

## 2019-05-09 NOTE — ANESTHESIA POSTPROCEDURE EVALUATION
Department of Anesthesiology  Postprocedure Note    Patient: Barbara Hallman  MRN: [id number]  YOB: 1955  Date of evaluation: 5/9/2019  Time:  1:39 PM     Procedure Summary     Date:  05/09/19 Room / Location:  [facility]    Anesthesia Start:  1222 Anesthesia Stop:  8526    Procedure:  CYSTOSCOPY RIGHT DOUBLE J STENT PLACEMENT (Right ) Diagnosis:  (INPATIENT)    Surgeon:  Joseph Salgado MD Responsible Provider:  Jasmine Madrid DO    Anesthesia Type:  general ASA Status:  3          Anesthesia Type: general    Mari Phase I: Mari Score: 10    Mari Phase II:      Last vitals: Reviewed and per EMR flowsheets.        Anesthesia Post Evaluation    Patient location during evaluation: PACU  Patient participation: complete - patient participated  Level of consciousness: sleepy but conscious  Pain score: 0  Airway patency: patent  Nausea & Vomiting: no nausea and no vomiting  Complications: no  Cardiovascular status: blood pressure returned to baseline and hemodynamically stable  Respiratory status: acceptable  Hydration status: euvolemic

## 2019-05-09 NOTE — PROGRESS NOTES
Pt npo effective now per Dr Porras May on hold per Dr Mimi Mccoy Pt informed stent placement likely today

## 2019-05-10 LAB — URINE CULTURE, ROUTINE: NORMAL

## 2019-05-10 NOTE — OP NOTE
[address]                      [address], [address] [facility]                                OPERATIVE REPORT    PATIENT NAME: Kush Gleason                 :        1955  MED REC NO:   [id number]                            ROOM:       I103  ACCOUNT NO:   [de-identified]                           ADMIT DATE: 2019  PROVIDER:     Joseph Salgado MD    DATE OF PROCEDURE:  2019    PREOPERATIVE DIAGNOSIS:  Right renal colic secondary to a 7-mm right UPJ  stone. POSTOPERATIVE DIAGNOSIS:  Right renal colic secondary to a 7-mm right  UPJ stone. OPERATION PERFORMED:  Cystoscopy, manipulation of right ureteral  calculus without removal, right retrograde pyelogram, right double J  stent placement. SURGEON:  Joseph Salgado MD    ANESTHESIA:  General.    INDICATIONS:  The patient is a 68-year-old male transferred from [facility] due to intractable pain secondary to a 7-mm UPJ stone. The patient  continues to have  severe pain requiring Dilaudid. The  patient will undergo double J stent placement and treatment with  shockwave lithotripsy on 05/15/2019. OPERATIVE NOTE:  The patient received preoperative IV antibiotics Cipro  400 mg IV. He was taken to the operating room placed on the operating  table in dorsal lithotomy position after initiation of general  anesthetic. A 21-Spanish cystoscope was used to perform a cystoscopy. Penile urethra was within normal limits. Prostatic urethra showed  minimal enlargement. Examination of bladder showed no evidence of  tumors, and/or other abnormalities. There were multiple fragments in  the bladder consistent with small calculi in dust form. A right  retrograde pyelogram was obtained, which showed a severe obstruction  secondary to a 7-mm stone in the right UPJ. The stone was then pushed  up into the right upper collecting system, which showed a severely  hydronephrotic right kidney.   The stone manipulation allowed the kidney  to drain better. At this time, a guidewire was placed in the right  collecting system over which a 6 x 26 double J stent was placed. A good  curl was observed both in the renal pelvis and the bladder. The bladder  was then drained and the patient was discharged to recovery room in  stable condition. The patient tolerated procedure well. There were no  complications. The patient will be sent home with ciprofloxacin 500 mg  p.o. b.i.d. He has been scheduled for right shockwave lithotripsy  05/15/2019 at [facility].         Lacy Leyva MD    D: 05/09/2019 15:44:10       T: 05/09/2019 16:15:14     [email or web address]  Job#: [id number]     Doc#: [id number]    CC:

## 2019-05-10 NOTE — CONSULTS
Urology  Consult dictated  For right double-J stent placement today  Spoke to patient understands the need for double-J stent he has had one before  Dr Jeanette Moore
right UPJ; both on  CT and KUB. ASSESSMENT:  Severe obstructing 7 mm stone right UPJ with intractable  pain. PLAN:  The patient will be taken to the operating room emergently for  double-J stent placement.         Wood Boucher MD    D: 05/09/2019 15:41:47       T: 05/09/2019 16:15:14     [email or web address]  Job#: [id number]     Doc#: [id number]    CC:

## 2019-05-13 ENCOUNTER — OFFICE VISIT (OUTPATIENT)
Dept: INTERNAL MEDICINE | Age: 64
End: 2019-05-13
Payer: MEDICARE

## 2019-05-13 VITALS
HEIGHT: 69 IN | OXYGEN SATURATION: 98 % | SYSTOLIC BLOOD PRESSURE: 136 MMHG | BODY MASS INDEX: 35.16 KG/M2 | WEIGHT: 237.4 LBS | TEMPERATURE: 98.4 F | DIASTOLIC BLOOD PRESSURE: 98 MMHG | HEART RATE: 80 BPM

## 2019-05-13 DIAGNOSIS — N20.0 KIDNEY STONES: Primary | ICD-10-CM

## 2019-05-13 PROCEDURE — 99214 OFFICE O/P EST MOD 30 MIN: CPT | Performed by: PHYSICIAN ASSISTANT

## 2019-05-13 PROCEDURE — 1111F DSCHRG MED/CURRENT MED MERGE: CPT | Performed by: PHYSICIAN ASSISTANT

## 2019-05-13 RX ORDER — CARVEDILOL 6.25 MG/1
6.25 TABLET ORAL 2 TIMES DAILY
Qty: 60 TABLET | Refills: 0 | Status: CANCELLED
Start: 2019-05-13

## 2019-05-13 ASSESSMENT — PATIENT HEALTH QUESTIONNAIRE - PHQ9
SUM OF ALL RESPONSES TO PHQ QUESTIONS 1-9: 0
1. LITTLE INTEREST OR PLEASURE IN DOING THINGS: 0
2. FEELING DOWN, DEPRESSED OR HOPELESS: 0
SUM OF ALL RESPONSES TO PHQ9 QUESTIONS 1 & 2: 0
SUM OF ALL RESPONSES TO PHQ QUESTIONS 1-9: 0

## 2019-05-13 ASSESSMENT — ENCOUNTER SYMPTOMS: SHORTNESS OF BREATH: 0

## 2019-05-13 NOTE — PROGRESS NOTES
Phone call PAT complete. Pt instructed NOT to chew tobacco the night before or morning of surgery. Pt verbalizes understanding.

## 2019-05-13 NOTE — PROGRESS NOTES
Post-Discharge Transitional Care Management Services or Hospital Follow Up      Fran Berry   YOB: 1955    Date of Office Visit:  5/13/2019  Date of Hospital Admission: 5/8/19  Date of Hospital Discharge: 5/9/19  Readmission Risk Score(high >=14%. Medium >=10%):Readmission Risk Score: 0      Care management risk score Rising risk (score 2-5) and Complex Care (Scores >=6): 0     Non face to face  following discharge, date last encounter closed (first attempt may have been earlier): *No documented post hospital discharge outreach found in the last 14 days *No documented post hospital discharge outreach found in the last 14 days    Call initiated 2 business days of discharge: *No response recorded in the last 14 days     Patient Active Problem List   Diagnosis    Coronary atherosclerosis    Hyperlipidemia    S/P coronary artery bypass graft x 5    Acute kidney injury ([address]    Kidney stone       No Known Allergies    Medications listed as ordered at the time of discharge from hospital   [place] K[facility]keyan   Home Medication Instructions CHRISTINA:    Printed on:05/13/19 5801   Medication Information                      aspirin (ASPIRIN 81) 81 MG chewable tablet  Take 81 mg by mouth Daily             carvedilol (COREG) 6.25 MG tablet  Take 6.25 mg by mouth 2 times daily              ciprofloxacin (CIPRO) 500 MG tablet  Take 1 tablet by mouth 2 times daily for 10 days             Coenzyme Q10 (CO Q10) 100 MG CAPS  Take 100 mg by mouth daily             doxazosin (CARDURA) 4 MG tablet  Take 1 tablet by mouth nightly             Glucosamine HCl 1500 MG TABS  Take 1,500 mg by mouth Daily with lunch             Naproxen Sodium (ALEVE) 220 MG CAPS  Take by mouth             nitroGLYCERIN (NITROSTAT) 0.4 MG SL tablet  Place 0.4 mg under the tongue every 5 minutes as needed for Chest pain up to max of 3 total doses. If no relief after 1 dose, call 911.              pravastatin (PRAVACHOL) 40 MG and leg swelling. Genitourinary: Negative for difficulty urinating, dysuria, flank pain, frequency, hematuria, penile pain, penile swelling and urgency. Neurological: Negative for dizziness, weakness, light-headedness, numbness and headaches. Vitals:    05/13/19 1052 05/13/19 1057   BP: (!) 138/92 (!) 136/98   Site: Right Upper Arm Left Upper Arm   Position: Sitting Sitting   Cuff Size: Large Adult Large Adult   Pulse: 80    Temp: 98.4 °F (36.9 °C)    TempSrc: Temporal    SpO2: 98%    Weight: 237 lb 6.4 oz (107.7 kg)    Height: 5' 9\" (1.753 m)      Body mass index is 35.06 kg/m². Wt Readings from Last 3 Encounters:   05/13/19 237 lb 6.4 oz (107.7 kg)   05/08/19 225 lb (102.1 kg)   05/08/19 225 lb (102.1 kg)     BP Readings from Last 3 Encounters:   05/13/19 (!) 136/98   05/09/19 136/74   05/09/19 (!) 102/56       Physical Exam   Constitutional: He appears well-developed and well-nourished. HENT:   Head: Normocephalic and atraumatic. Eyes: Pupils are equal, round, and reactive to light. Conjunctivae and EOM are normal.   Neck: Normal range of motion. Cardiovascular: Normal rate, regular rhythm and normal heart sounds. Pulmonary/Chest: Effort normal and breath sounds normal.   Abdominal: Soft. Bowel sounds are normal. He exhibits no distension. There is no tenderness. There is no rebound, no guarding and no CVA tenderness. Vitals reviewed. Assessment/Plan:  1.  Kidney stones  - reviewed hospital records  - med reconciled  - seeing urology in 2 days  - discussed diet to prevent kidney stones         Medical Decision Making: high complexity

## 2019-05-13 NOTE — PATIENT INSTRUCTIONS
Patient Education        Learning About Diet for Kidney Stone Prevention  What are kidney stones? Kidney stones are made of salts and minerals in the urine that form small \"marilyn. \" Stones can form in the kidneys and the ureters (the tubes that lead from the kidneys to the bladder). They can also form in the bladder. Stones may not cause a problem as long as they stay in the kidneys. But they can cause sudden, severe pain. Pain is most likely when the stones travel from the kidneys to the bladder. Kidney stones can cause bloody urine. Kidney stones often run in families. You are more likely to get them if you don't drink enough fluids, mainly water. Certain foods and drinks and some dietary supplements may also increase your risk for kidney stones if you consume too much of them. What can you do to prevent kidney stones? Changing what you eat may not prevent all types of kidney stones. But for people who have a history of certain kinds of kidney stones, some changes in diet may help. A dietitian can help you set up a meal plan that includes healthy, low-oxalate choices. Here are some general guidelines to get you started. Plan your meals and snacks around foods that are low in oxalate. These foods include:  · Corn, kale, parsnips, and squash,. · Beef, chicken, pork, turkey, and fish. · Milk, butter, cheese, and yogurt. You can eat certain foods that are medium-high in oxalate, but eat them only once in a while. These foods include:  · Bread. · Brown rice. · English muffins. · Figs. · Popcorn. · String beans. · Tomatoes. Limit very high-oxalate foods, including:  · Black tea. · Coffee. · Chocolate. · Dark green vegetables. · Nuts. Here are some other things you can do to help prevent kidney stones. · Drink plenty of fluids. If you have kidney, heart, or liver disease and have to limit fluids, talk with your doctor before you increase the amount of fluids you drink.   · Do not take more than the recommended daily dose of vitamins C and D.  · Limit the salt in your diet. · Eat a balanced diet that is not too high in protein. Follow-up care is a key part of your treatment and safety. Be sure to make and go to all appointments, and call your doctor if you are having problems. It's also a good idea to know your test results and keep a list of the medicines you take. Where can you learn more? Go to [email or web address] and sign in to your [employer] account. Enter C138 in the Pervasip box to learn more about \"Learning About Diet for Kidney Stone Prevention. \"     If you do not have an account, please click on the \"Sign Up Now\" link. Current as of: November 7, 2018  Content Version: 12.0  © 1392-0004 Healthwise, Incorporated. Care instructions adapted under license by [facility] ([facility]). If you have questions about a medical condition or this instruction, always ask your healthcare professional. [place] [address] any warranty or liability for your use of this information.

## 2019-05-14 ENCOUNTER — HOSPITAL ENCOUNTER (OUTPATIENT)
Dept: CT IMAGING | Age: 64
Discharge: HOME | End: 2019-05-16
Payer: MEDICARE

## 2019-05-14 ENCOUNTER — OFFICE VISIT (OUTPATIENT)
Dept: UROLOGY | Age: 64
End: 2019-05-14
Payer: MEDICARE

## 2019-05-14 ENCOUNTER — HOSPITAL ENCOUNTER (OUTPATIENT)
Dept: GENERAL RADIOLOGY | Age: 64
Discharge: HOME | End: 2019-05-16
Payer: MEDICARE

## 2019-05-14 ENCOUNTER — APPOINTMENT (OUTPATIENT)
Dept: GENERAL RADIOLOGY | Age: 64
End: 2019-05-14
Payer: MEDICARE

## 2019-05-14 VITALS
DIASTOLIC BLOOD PRESSURE: 72 MMHG | HEIGHT: 68 IN | SYSTOLIC BLOOD PRESSURE: 120 MMHG | BODY MASS INDEX: 34.86 KG/M2 | WEIGHT: 230 LBS | HEART RATE: 75 BPM

## 2019-05-14 VITALS — HEIGHT: 68 IN | BODY MASS INDEX: 34.86 KG/M2 | WEIGHT: 230 LBS

## 2019-05-14 DIAGNOSIS — R52 PAIN: ICD-10-CM

## 2019-05-14 DIAGNOSIS — N20.0 KIDNEY STONE: Primary | ICD-10-CM

## 2019-05-14 DIAGNOSIS — N20.0 KIDNEY STONE: ICD-10-CM

## 2019-05-14 LAB
BILIRUBIN, POC: ABNORMAL
BLOOD URINE, POC: ABNORMAL
CLARITY, POC: ABNORMAL
COLOR, POC: YELLOW
GLUCOSE URINE, POC: ABNORMAL
KETONES, POC: ABNORMAL
LEUKOCYTE EST, POC: ABNORMAL
NITRITE, POC: ABNORMAL
PH, POC: 5
PROTEIN, POC: ABNORMAL
SPECIFIC GRAVITY, POC: >=1.03
UROBILINOGEN, POC: 0.2

## 2019-05-14 PROCEDURE — 81003 URINALYSIS AUTO W/O SCOPE: CPT | Performed by: UROLOGY

## 2019-05-14 PROCEDURE — 99024 POSTOP FOLLOW-UP VISIT: CPT | Performed by: UROLOGY

## 2019-05-14 PROCEDURE — 74176 CT ABD & PELVIS W/O CONTRAST: CPT

## 2019-05-14 PROCEDURE — 74018 RADEX ABDOMEN 1 VIEW: CPT

## 2019-05-14 NOTE — PROGRESS NOTES
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   Social Needs    Financial resource strain: None    Food insecurity:     Worry: None     Inability: None    Transportation needs:     Medical: None     Non-medical: None   Tobacco Use    Smoking status: Never Smoker    Smokeless tobacco: Current User     Types: Chew   Substance and Sexual Activity    Alcohol use: No    Drug use: No    Sexual activity: Never   Lifestyle    Physical activity:     Days per week: None     Minutes per session: None    Stress: None   Relationships    Social connections:     Talks on phone: None     Gets together: None     Attends Taoism service: None     Active member of club or organization: None     Attends meetings of clubs or organizations: None     Relationship status: None    Intimate partner violence:     Fear of current or ex partner: None     Emotionally abused: None     Physically abused: None     Forced sexual activity: None   Other Topics Concern    None   Social History Narrative    None     Family History   Problem Relation Age of Onset    Alzheimer's Disease Mother     Cancer Father         leukemia      Current Outpatient Medications   Medication Sig Dispense Refill    ciprofloxacin (CIPRO) 500 MG tablet Take 1 tablet by mouth 2 times daily for 10 days 20 tablet 0    doxazosin (CARDURA) 4 MG tablet Take 1 tablet by mouth nightly 30 tablet 0    carvedilol (COREG) 6.25 MG tablet Take 6.25 mg by mouth 2 times daily       pravastatin (PRAVACHOL) 40 MG tablet Take 40 mg by mouth daily      Naproxen Sodium (ALEVE) 220 MG CAPS Take by mouth      aspirin (ASPIRIN 81) 81 MG chewable tablet Take 81 mg by mouth Daily      Glucosamine HCl 1500 MG TABS Take 1,500 mg by mouth Daily with lunch      Coenzyme Q10 (CO Q10) 100 MG CAPS Take 100 mg by mouth daily      nitroGLYCERIN (NITROSTAT) 0.4 MG SL tablet Place 0.4 mg under the tongue every 5 minutes as needed for Chest pain up to max of 3 total doses. If no relief after 1 dose, call 911. No current facility-administered medications for this visit. Patient has no known allergies. All reviewed and verified by Dr Abby Lawson on today's visit    PSA   Date Value Ref Range Status   10/02/2018 0.97 0.00 - 5.40 ng/mL Final     Comment:     When the Total PSA is between 3.00 and 10.00 ng/mL, consider  requesting a Free PSA to aid in diagnosis. Results for POC orders placed in visit on 05/14/19   POCT Urinalysis No Micro (Auto)   Result Value Ref Range    Color, UA yellow     Clarity, UA c;ear     Glucose, UA POC neg     Bilirubin, UA neg     Ketones, UA eng     Spec Grav, UA >=1.030     Blood, UA POC large     pH, UA 5.0     Protein, UA  mg/dL     Urobilinogen, UA 0.2     Leukocytes, UA trace     Nitrite, UA neg        Physical Exam  Vitals:    05/14/19 0909   BP: 120/72   Pulse: 75   Weight: 230 lb (104.3 kg)   Height: 5' 7.5\" (1.715 m)     Constitutional: patient is oriented to person, place, and time. patient appears well-developed. not in distress. Ears: Adequate hearing/no hearing loss  Head: Normocephalic. Atraumatic  Neck: Normal range of motion. Cardiovascular: Normal rate, BP reviewed. normal rhythm  Pulmonary/Chest: Normal respiratory effort  no evidence of shortness of breath  Abdominal: Not distended. Denies abdominal pain  Urologic Exam  KUB reviewed. CT reviewed. No other findings urologically . Musculoskeletal: Normal range of motion. Normal strength. Extremities: No edema   Neurological: No deficits normal gait   Skin: Skin is warm and dry. No lesions. No rashes   Psychiatric:  Normal affect.   Assessment/Medical Necessity-Decision Making  Right renal calculi  Scheduled for right shockwave lithotripsy  No plan on removing

## 2019-05-15 ENCOUNTER — ANESTHESIA EVENT (OUTPATIENT)
Dept: OPERATING ROOM | Age: 64
End: 2019-05-15
Payer: MEDICARE

## 2019-05-15 ENCOUNTER — HOSPITAL ENCOUNTER (OUTPATIENT)
Age: 64
Setting detail: OUTPATIENT SURGERY
Discharge: HOME | End: 2019-05-15
Attending: UROLOGY | Admitting: UROLOGY
Payer: MEDICARE

## 2019-05-15 ENCOUNTER — APPOINTMENT (OUTPATIENT)
Dept: GENERAL RADIOLOGY | Age: 64
End: 2019-05-15
Attending: UROLOGY
Payer: MEDICARE

## 2019-05-15 ENCOUNTER — ANESTHESIA (OUTPATIENT)
Dept: OPERATING ROOM | Age: 64
End: 2019-05-15
Payer: MEDICARE

## 2019-05-15 VITALS
HEART RATE: 63 BPM | TEMPERATURE: 97.7 F | SYSTOLIC BLOOD PRESSURE: 157 MMHG | RESPIRATION RATE: 16 BRPM | DIASTOLIC BLOOD PRESSURE: 84 MMHG | WEIGHT: 230 LBS | OXYGEN SATURATION: 94 % | HEIGHT: 68 IN | BODY MASS INDEX: 34.86 KG/M2

## 2019-05-15 VITALS — OXYGEN SATURATION: 97 % | SYSTOLIC BLOOD PRESSURE: 120 MMHG | DIASTOLIC BLOOD PRESSURE: 71 MMHG | TEMPERATURE: 96.4 F

## 2019-05-15 DIAGNOSIS — N20.0 KIDNEY STONE: Primary | ICD-10-CM

## 2019-05-15 PROCEDURE — 6360000002 HC RX W HCPCS: Performed by: NURSE ANESTHETIST, CERTIFIED REGISTERED

## 2019-05-15 PROCEDURE — 7100000010 HC PHASE II RECOVERY - FIRST 15 MIN: Performed by: UROLOGY

## 2019-05-15 PROCEDURE — 50590 FRAGMENTING OF KIDNEY STONE: CPT | Performed by: UROLOGY

## 2019-05-15 PROCEDURE — 3700000001 HC ADD 15 MINUTES (ANESTHESIA): Performed by: UROLOGY

## 2019-05-15 PROCEDURE — 7100000001 HC PACU RECOVERY - ADDTL 15 MIN: Performed by: UROLOGY

## 2019-05-15 PROCEDURE — 3700000000 HC ANESTHESIA ATTENDED CARE: Performed by: UROLOGY

## 2019-05-15 PROCEDURE — 7100000000 HC PACU RECOVERY - FIRST 15 MIN: Performed by: UROLOGY

## 2019-05-15 PROCEDURE — 2580000003 HC RX 258: Performed by: NURSE PRACTITIONER

## 2019-05-15 PROCEDURE — 2709999900 HC NON-CHARGEABLE SUPPLY: Performed by: UROLOGY

## 2019-05-15 PROCEDURE — 2500000003 HC RX 250 WO HCPCS: Performed by: NURSE ANESTHETIST, CERTIFIED REGISTERED

## 2019-05-15 PROCEDURE — 3600000004 HC SURGERY LEVEL 4 BASE: Performed by: UROLOGY

## 2019-05-15 PROCEDURE — 7100000011 HC PHASE II RECOVERY - ADDTL 15 MIN: Performed by: UROLOGY

## 2019-05-15 PROCEDURE — 74018 RADEX ABDOMEN 1 VIEW: CPT

## 2019-05-15 PROCEDURE — 3600000014 HC SURGERY LEVEL 4 ADDTL 15MIN: Performed by: UROLOGY

## 2019-05-15 PROCEDURE — 2500000003 HC RX 250 WO HCPCS: Performed by: NURSE PRACTITIONER

## 2019-05-15 RX ORDER — FENTANYL CITRATE 50 UG/ML
INJECTION, SOLUTION INTRAMUSCULAR; INTRAVENOUS PRN
Status: DISCONTINUED | OUTPATIENT
Start: 2019-05-15 | End: 2019-05-15 | Stop reason: SDUPTHER

## 2019-05-15 RX ORDER — SODIUM CHLORIDE, SODIUM LACTATE, POTASSIUM CHLORIDE, CALCIUM CHLORIDE 600; 310; 30; 20 MG/100ML; MG/100ML; MG/100ML; MG/100ML
INJECTION, SOLUTION INTRAVENOUS CONTINUOUS
Status: DISCONTINUED | OUTPATIENT
Start: 2019-05-15 | End: 2019-05-15 | Stop reason: HOSPADM

## 2019-05-15 RX ORDER — MIDAZOLAM HYDROCHLORIDE 1 MG/ML
INJECTION INTRAMUSCULAR; INTRAVENOUS PRN
Status: DISCONTINUED | OUTPATIENT
Start: 2019-05-15 | End: 2019-05-15 | Stop reason: SDUPTHER

## 2019-05-15 RX ORDER — HYDROCODONE BITARTRATE AND ACETAMINOPHEN 5; 325 MG/1; MG/1
2 TABLET ORAL PRN
Status: DISCONTINUED | OUTPATIENT
Start: 2019-05-15 | End: 2019-05-15 | Stop reason: HOSPADM

## 2019-05-15 RX ORDER — HYDROCODONE BITARTRATE AND ACETAMINOPHEN 5; 325 MG/1; MG/1
1 TABLET ORAL EVERY 4 HOURS PRN
Qty: 18 TABLET | Refills: 0 | Status: SHIPPED | OUTPATIENT
Start: 2019-05-15 | End: 2019-05-18

## 2019-05-15 RX ORDER — ONDANSETRON 2 MG/ML
4 INJECTION INTRAMUSCULAR; INTRAVENOUS
Status: DISCONTINUED | OUTPATIENT
Start: 2019-05-15 | End: 2019-05-15 | Stop reason: HOSPADM

## 2019-05-15 RX ORDER — PROPOFOL 10 MG/ML
INJECTION, EMULSION INTRAVENOUS PRN
Status: DISCONTINUED | OUTPATIENT
Start: 2019-05-15 | End: 2019-05-15 | Stop reason: SDUPTHER

## 2019-05-15 RX ORDER — HYDROCODONE BITARTRATE AND ACETAMINOPHEN 5; 325 MG/1; MG/1
1 TABLET ORAL PRN
Status: DISCONTINUED | OUTPATIENT
Start: 2019-05-15 | End: 2019-05-15 | Stop reason: HOSPADM

## 2019-05-15 RX ORDER — EPHEDRINE SULFATE/0.9% NACL/PF 50 MG/5 ML
SYRINGE (ML) INTRAVENOUS PRN
Status: DISCONTINUED | OUTPATIENT
Start: 2019-05-15 | End: 2019-05-15 | Stop reason: SDUPTHER

## 2019-05-15 RX ORDER — METOCLOPRAMIDE HYDROCHLORIDE 5 MG/ML
10 INJECTION INTRAMUSCULAR; INTRAVENOUS
Status: DISCONTINUED | OUTPATIENT
Start: 2019-05-15 | End: 2019-05-15 | Stop reason: HOSPADM

## 2019-05-15 RX ORDER — SODIUM CHLORIDE 0.9 % (FLUSH) 0.9 %
10 SYRINGE (ML) INJECTION PRN
Status: DISCONTINUED | OUTPATIENT
Start: 2019-05-15 | End: 2019-05-15 | Stop reason: HOSPADM

## 2019-05-15 RX ORDER — MEPERIDINE HYDROCHLORIDE 25 MG/ML
12.5 INJECTION INTRAMUSCULAR; INTRAVENOUS; SUBCUTANEOUS EVERY 5 MIN PRN
Status: DISCONTINUED | OUTPATIENT
Start: 2019-05-15 | End: 2019-05-15 | Stop reason: HOSPADM

## 2019-05-15 RX ORDER — LIDOCAINE HYDROCHLORIDE 10 MG/ML
1 INJECTION, SOLUTION EPIDURAL; INFILTRATION; INTRACAUDAL; PERINEURAL
Status: COMPLETED | OUTPATIENT
Start: 2019-05-15 | End: 2019-05-15

## 2019-05-15 RX ORDER — SODIUM CHLORIDE 0.9 % (FLUSH) 0.9 %
10 SYRINGE (ML) INJECTION EVERY 12 HOURS SCHEDULED
Status: DISCONTINUED | OUTPATIENT
Start: 2019-05-15 | End: 2019-05-15 | Stop reason: HOSPADM

## 2019-05-15 RX ORDER — TAMSULOSIN HYDROCHLORIDE 0.4 MG/1
0.4 CAPSULE ORAL DAILY
Qty: 10 CAPSULE | Refills: 0 | Status: SHIPPED | OUTPATIENT
Start: 2019-05-15 | End: 2019-05-20 | Stop reason: SDUPTHER

## 2019-05-15 RX ORDER — FENTANYL CITRATE 50 UG/ML
50 INJECTION, SOLUTION INTRAMUSCULAR; INTRAVENOUS EVERY 10 MIN PRN
Status: DISCONTINUED | OUTPATIENT
Start: 2019-05-15 | End: 2019-05-15 | Stop reason: HOSPADM

## 2019-05-15 RX ORDER — ONDANSETRON 2 MG/ML
INJECTION INTRAMUSCULAR; INTRAVENOUS PRN
Status: DISCONTINUED | OUTPATIENT
Start: 2019-05-15 | End: 2019-05-15 | Stop reason: SDUPTHER

## 2019-05-15 RX ORDER — LIDOCAINE HYDROCHLORIDE 20 MG/ML
INJECTION, SOLUTION INTRAVENOUS PRN
Status: DISCONTINUED | OUTPATIENT
Start: 2019-05-15 | End: 2019-05-15 | Stop reason: SDUPTHER

## 2019-05-15 RX ORDER — DIPHENHYDRAMINE HYDROCHLORIDE 50 MG/ML
12.5 INJECTION INTRAMUSCULAR; INTRAVENOUS
Status: DISCONTINUED | OUTPATIENT
Start: 2019-05-15 | End: 2019-05-15 | Stop reason: HOSPADM

## 2019-05-15 RX ORDER — DEXAMETHASONE SODIUM PHOSPHATE 10 MG/ML
INJECTION INTRAMUSCULAR; INTRAVENOUS PRN
Status: DISCONTINUED | OUTPATIENT
Start: 2019-05-15 | End: 2019-05-15 | Stop reason: SDUPTHER

## 2019-05-15 RX ADMIN — PHENYLEPHRINE HYDROCHLORIDE 100 MCG: 10 INJECTION INTRAVENOUS at 12:40

## 2019-05-15 RX ADMIN — PHENYLEPHRINE HYDROCHLORIDE 100 MCG: 10 INJECTION INTRAVENOUS at 12:49

## 2019-05-15 RX ADMIN — PHENYLEPHRINE HYDROCHLORIDE 200 MCG: 10 INJECTION INTRAVENOUS at 12:57

## 2019-05-15 RX ADMIN — PROPOFOL 180 MG: 10 INJECTION, EMULSION INTRAVENOUS at 12:32

## 2019-05-15 RX ADMIN — Medication 15 MG: at 13:01

## 2019-05-15 RX ADMIN — LIDOCAINE HYDROCHLORIDE 100 MG: 20 INJECTION, SOLUTION INTRAVENOUS at 12:32

## 2019-05-15 RX ADMIN — ONDANSETRON 4 MG: 2 INJECTION INTRAMUSCULAR; INTRAVENOUS at 13:08

## 2019-05-15 RX ADMIN — Medication 10 MG: at 12:46

## 2019-05-15 RX ADMIN — DEXAMETHASONE SODIUM PHOSPHATE 10 MG: 10 INJECTION INTRAMUSCULAR; INTRAVENOUS at 12:49

## 2019-05-15 RX ADMIN — Medication 5 MG: at 12:40

## 2019-05-15 RX ADMIN — LIDOCAINE HYDROCHLORIDE 0.1 ML: 10 INJECTION, SOLUTION EPIDURAL; INFILTRATION; INTRACAUDAL; PERINEURAL at 11:15

## 2019-05-15 RX ADMIN — PHENYLEPHRINE HYDROCHLORIDE 100 MCG: 10 INJECTION INTRAVENOUS at 12:53

## 2019-05-15 RX ADMIN — SODIUM CHLORIDE, POTASSIUM CHLORIDE, SODIUM LACTATE AND CALCIUM CHLORIDE: 600; 310; 30; 20 INJECTION, SOLUTION INTRAVENOUS at 12:29

## 2019-05-15 RX ADMIN — Medication 5 MG: at 12:41

## 2019-05-15 RX ADMIN — MIDAZOLAM HYDROCHLORIDE 1 MG: 1 INJECTION, SOLUTION INTRAMUSCULAR; INTRAVENOUS at 12:29

## 2019-05-15 RX ADMIN — FENTANYL CITRATE 25 MCG: 50 INJECTION, SOLUTION INTRAMUSCULAR; INTRAVENOUS at 12:43

## 2019-05-15 RX ADMIN — PHENYLEPHRINE HYDROCHLORIDE 100 MCG: 10 INJECTION INTRAVENOUS at 12:41

## 2019-05-15 RX ADMIN — SODIUM CHLORIDE, POTASSIUM CHLORIDE, SODIUM LACTATE AND CALCIUM CHLORIDE: 600; 310; 30; 20 INJECTION, SOLUTION INTRAVENOUS at 11:18

## 2019-05-15 RX ADMIN — SODIUM CHLORIDE, POTASSIUM CHLORIDE, SODIUM LACTATE AND CALCIUM CHLORIDE 1000 ML: 600; 310; 30; 20 INJECTION, SOLUTION INTRAVENOUS at 13:47

## 2019-05-15 RX ADMIN — Medication 5 MG: at 12:39

## 2019-05-15 ASSESSMENT — PULMONARY FUNCTION TESTS
PIF_VALUE: 14
PIF_VALUE: 5
PIF_VALUE: 13
PIF_VALUE: 7
PIF_VALUE: 7
PIF_VALUE: 15
PIF_VALUE: 11
PIF_VALUE: 5
PIF_VALUE: 5
PIF_VALUE: 9
PIF_VALUE: 5
PIF_VALUE: 15
PIF_VALUE: 0
PIF_VALUE: 9
PIF_VALUE: 5
PIF_VALUE: 11
PIF_VALUE: 13
PIF_VALUE: 13
PIF_VALUE: 5
PIF_VALUE: 5
PIF_VALUE: 7
PIF_VALUE: 13
PIF_VALUE: 1
PIF_VALUE: 6
PIF_VALUE: 9
PIF_VALUE: 7
PIF_VALUE: 3
PIF_VALUE: 9
PIF_VALUE: 5
PIF_VALUE: 9
PIF_VALUE: 5
PIF_VALUE: 6
PIF_VALUE: 13
PIF_VALUE: 11
PIF_VALUE: 0
PIF_VALUE: 1
PIF_VALUE: 12
PIF_VALUE: 9
PIF_VALUE: 9
PIF_VALUE: 13
PIF_VALUE: 1
PIF_VALUE: 11
PIF_VALUE: 5
PIF_VALUE: 7
PIF_VALUE: 20
PIF_VALUE: 0
PIF_VALUE: 11
PIF_VALUE: 20
PIF_VALUE: 5
PIF_VALUE: 15
PIF_VALUE: 11
PIF_VALUE: 19
PIF_VALUE: 3

## 2019-05-15 ASSESSMENT — PAIN - FUNCTIONAL ASSESSMENT: PAIN_FUNCTIONAL_ASSESSMENT: 0-10

## 2019-05-15 NOTE — ANESTHESIA PRE PROCEDURE
Department of Anesthesiology  Preprocedure Note       Name:  Vikas Sands   Age:  70 y.o.  :  1955                                          MRN:  [id number]         Date:  5/15/2019      Surgeon: Rogers Pathak):  Adilson Lopez MD    Procedure: RIGHT ESWL (Right )    Medications prior to admission:   Prior to Admission medications    Medication Sig Start Date End Date Taking? Authorizing Provider   ciprofloxacin (CIPRO) 500 MG tablet Take 1 tablet by mouth 2 times daily for 10 days 19 Yes Murali Zavala MD   doxazosin (CARDURA) 4 MG tablet Take 1 tablet by mouth nightly 19  Yes Murali Zavala MD   Naproxen Sodium (ALEVE) 220 MG CAPS Take by mouth   Yes Historical Provider, MD   aspirin (ASPIRIN 81) 81 MG chewable tablet Take 81 mg by mouth Daily   Yes Historical Provider, MD   carvedilol (COREG) 6.25 MG tablet Take 6.25 mg by mouth 2 times daily    Yes Benay Goodpasture, MD   Glucosamine HCl 1500 MG TABS Take 1,500 mg by mouth Daily with lunch   Yes Historical Provider, MD   Coenzyme Q10 (CO Q10) 100 MG CAPS Take 100 mg by mouth daily   Yes Historical Provider, MD   pravastatin (PRAVACHOL) 40 MG tablet Take 40 mg by mouth daily   Yes Historical Provider, MD   nitroGLYCERIN (NITROSTAT) 0.4 MG SL tablet Place 0.4 mg under the tongue every 5 minutes as needed for Chest pain up to max of 3 total doses. If no relief after 1 dose, call 911.     Historical Provider, MD       Current medications:    Current Facility-Administered Medications   Medication Dose Route Frequency Provider Last Rate Last Dose    lactated ringers infusion   Intravenous Continuous MARI Pang -  mL/hr at 05/15/19 1118      sodium chloride flush 0.9 % injection 10 mL  10 mL Intravenous 2 times per day Rollo Lamjohnathan APRN - CNP        sodium chloride flush 0.9 % injection 10 mL  10 mL Intravenous PRN Piyush Hollis APRN - CNP           Allergies:  No Known Allergies    Problem List:    Patient Active Problem List   Diagnosis Code    Coronary atherosclerosis I25.10    Hyperlipidemia E78.5    S/P coronary artery bypass graft x 5 Z95.1    Acute kidney injury ([facility] N17.9    Kidney stone N20.0       Past Medical History:        Diagnosis Date    Acute kidney injury ([facility] 10/3/2018    Congenital heart disease     Coronary atherosclerosis 9/8/2015    Hearing loss     HTN (hypertension) 9/8/2015    Hyperlipidemia 9/8/2015    PONV (postoperative nausea and vomiting)        Past Surgical History:        Procedure Laterality Date    CARDIAC SURGERY      CYSTOSCOPY INSERTION / REMOVAL STENT / STONE Right 5/9/2019    CYSTOSCOPY RIGHT DOUBLE J STENT PLACEMENT performed by Leda Goncalves MD at [address] [facility]      left shoulder, both knees    KNEE ARTHROPLASTY      SD COLON CA SCRN NOT  [facility] N/A 10/17/2018    COLONOSCOPY performed by Jose Shaw MD at [address] [facility] [address] History:    Social History     Tobacco Use    Smoking status: Never Smoker    Smokeless tobacco: Current User     Types: Chew   Substance Use Topics    Alcohol use:  No                                Ready to quit: Not Answered  Counseling given: Not Answered      Vital Signs (Current):   Vitals:    05/13/19 1526 05/15/19 1050   BP:  129/76   Pulse:  71   Resp:  16   Temp:  98.7 °F (37.1 °C)   TempSrc:  Temporal   SpO2:  96%   Weight: 230 lb (104.3 kg)    Height: 5' 7.5\" (1.715 m)                                               BP Readings from Last 3 Encounters:   05/15/19 129/76   05/14/19 120/72   05/13/19 (!) 136/98       NPO Status: Time of last liquid consumption: 2200                        Time of last solid consumption: 1800                        Date of last liquid consumption: 05/14/19                        Date of last solid food consumption: 05/14/19    BMI:   Wt Readings from Last 3 Encounters:   05/13/19 230 lb (104.3 kg)   05/14/19 230 lb (104.3 kg)   05/14/19 230 lb (104.3 kg)

## 2019-05-15 NOTE — ANESTHESIA POSTPROCEDURE EVALUATION
Department of Anesthesiology  Postprocedure Note    Patient: Shan Gonzalez  MRN: [id number]  YOB: 1955  Date of evaluation: 5/15/2019  Time:  3:12 PM     Procedure Summary     Date:  05/15/19 Room / Location:  [address] OR    Anesthesia Start:  3496 Anesthesia Stop:  4465    Procedure:  RIGHT ESWL (Right ) Diagnosis:  (KIDNEY STONE)    Surgeon:  Lesley Gonzalez MD Responsible Provider:  Tete Pelaez DO    Anesthesia Type:  general ASA Status:  3          Anesthesia Type: general    Mari Phase I: Mari Score: 10    Mari Phase II: Mari Score: 10    Last vitals: Reviewed and per EMR flowsheets.        Anesthesia Post Evaluation    Patient location during evaluation: PACU  Patient participation: complete - patient participated  Level of consciousness: awake  Pain score: 0  Airway patency: patent  Nausea & Vomiting: no nausea and no vomiting  Complications: no  Cardiovascular status: hemodynamically stable  Respiratory status: acceptable  Hydration status: euvolemic

## 2019-05-15 NOTE — BRIEF OP NOTE
Brief Postoperative Note  ______________________________________________________________    Patient: Beulah Castillo  YOB: 1955  MRN: [id number]  Date of Procedure: 5/15/2019    Pre-Op Diagnosis: KIDNEY STONE    Post-Op Diagnosis: Same       Procedure(s):  RIGHT ESWL    Anesthesia: General    Surgeon(s):  Ke Steele MD    Assistant:       Estimated Blood Loss (mL): less than 50     Complications: None    Specimens:   * No specimens in log *    Implants:  * No implants in log *      Drains: * No LDAs found *    Findings: god fe\ragmentation of all stones    Ke Steele MD  Date: 5/15/2019  Time: 1:15 PM

## 2019-05-16 DIAGNOSIS — N20.0 KIDNEY STONE: Primary | ICD-10-CM

## 2019-05-16 RX ORDER — TAMSULOSIN HYDROCHLORIDE 0.4 MG/1
0.4 CAPSULE ORAL DAILY
Qty: 10 CAPSULE | Refills: 0 | OUTPATIENT
Start: 2019-05-16

## 2019-05-20 RX ORDER — TAMSULOSIN HYDROCHLORIDE 0.4 MG/1
0.4 CAPSULE ORAL DAILY
Qty: 30 CAPSULE | Refills: 1 | Status: SHIPPED | OUTPATIENT
Start: 2019-05-20 | End: 2019-07-25 | Stop reason: SDUPTHER

## 2019-05-24 ENCOUNTER — HOSPITAL ENCOUNTER (OUTPATIENT)
Dept: GENERAL RADIOLOGY | Age: 64
Discharge: HOME | End: 2019-05-26
Payer: MEDICARE

## 2019-05-24 ENCOUNTER — OFFICE VISIT (OUTPATIENT)
Dept: UROLOGY | Age: 64
End: 2019-05-24

## 2019-05-24 ENCOUNTER — TELEPHONE (OUTPATIENT)
Dept: UROLOGY | Age: 64
End: 2019-05-24

## 2019-05-24 VITALS
HEART RATE: 71 BPM | DIASTOLIC BLOOD PRESSURE: 86 MMHG | SYSTOLIC BLOOD PRESSURE: 136 MMHG | WEIGHT: 230 LBS | HEIGHT: 68 IN | BODY MASS INDEX: 34.86 KG/M2

## 2019-05-24 DIAGNOSIS — N20.0 KIDNEY STONE: Primary | ICD-10-CM

## 2019-05-24 DIAGNOSIS — N20.0 KIDNEY STONE: ICD-10-CM

## 2019-05-24 PROCEDURE — 74018 RADEX ABDOMEN 1 VIEW: CPT

## 2019-05-24 PROCEDURE — 99024 POSTOP FOLLOW-UP VISIT: CPT | Performed by: UROLOGY

## 2019-05-24 NOTE — PROGRESS NOTES
Urology  Postop check after shockwave lithotripsy and stent placement  Multiple fragments on the stent at the UPJ  Continue stent  Follow-up 2 weeks with KUB  Dr Jeanette Moore

## 2019-06-06 ENCOUNTER — OFFICE VISIT (OUTPATIENT)
Dept: UROLOGY | Age: 64
End: 2019-06-06

## 2019-06-06 ENCOUNTER — HOSPITAL ENCOUNTER (OUTPATIENT)
Dept: GENERAL RADIOLOGY | Age: 64
Discharge: HOME | End: 2019-06-08
Payer: MEDICARE

## 2019-06-06 VITALS
BODY MASS INDEX: 34.86 KG/M2 | HEART RATE: 67 BPM | DIASTOLIC BLOOD PRESSURE: 90 MMHG | HEIGHT: 68 IN | SYSTOLIC BLOOD PRESSURE: 136 MMHG | WEIGHT: 230 LBS

## 2019-06-06 DIAGNOSIS — N20.0 KIDNEY STONE: ICD-10-CM

## 2019-06-06 DIAGNOSIS — N20.0 KIDNEY STONE: Primary | ICD-10-CM

## 2019-06-06 PROCEDURE — 74018 RADEX ABDOMEN 1 VIEW: CPT

## 2019-06-06 PROCEDURE — 99024 POSTOP FOLLOW-UP VISIT: CPT | Performed by: UROLOGY

## 2019-06-06 NOTE — PROGRESS NOTES
Status post shockwave lithotripsy with double-J stent  Multiple calculi along the stent  Will await additional 2 weeks prior to planning on stent removal  KUB 2 weeks with follow-up  Denies fevers  Denies hematuria  Denies irritation stent  Dr Janice Almendarez

## 2019-06-25 ENCOUNTER — OFFICE VISIT (OUTPATIENT)
Dept: UROLOGY | Age: 64
End: 2019-06-25
Payer: MEDICARE

## 2019-06-25 ENCOUNTER — HOSPITAL ENCOUNTER (OUTPATIENT)
Dept: GENERAL RADIOLOGY | Age: 64
Discharge: HOME | End: 2019-06-27
Payer: MEDICARE

## 2019-06-25 VITALS
SYSTOLIC BLOOD PRESSURE: 132 MMHG | DIASTOLIC BLOOD PRESSURE: 84 MMHG | BODY MASS INDEX: 36.1 KG/M2 | WEIGHT: 230 LBS | HEIGHT: 67 IN | HEART RATE: 70 BPM

## 2019-06-25 DIAGNOSIS — N20.0 KIDNEY STONE: ICD-10-CM

## 2019-06-25 DIAGNOSIS — N20.0 KIDNEY STONE: Primary | ICD-10-CM

## 2019-06-25 LAB
BILIRUBIN, POC: ABNORMAL
BLOOD URINE, POC: ABNORMAL
CLARITY, POC: CLEAR
COLOR, POC: YELLOW
GLUCOSE URINE, POC: ABNORMAL
KETONES, POC: ABNORMAL
LEUKOCYTE EST, POC: ABNORMAL
NITRITE, POC: ABNORMAL
PH, POC: 5
PROTEIN, POC: ABNORMAL
SPECIFIC GRAVITY, POC: 1.02
UROBILINOGEN, POC: 0.2

## 2019-06-25 PROCEDURE — 99024 POSTOP FOLLOW-UP VISIT: CPT | Performed by: UROLOGY

## 2019-06-25 PROCEDURE — 81003 URINALYSIS AUTO W/O SCOPE: CPT | Performed by: UROLOGY

## 2019-06-25 PROCEDURE — 74018 RADEX ABDOMEN 1 VIEW: CPT

## 2019-06-25 RX ORDER — CIPROFLOXACIN 500 MG/1
500 TABLET, FILM COATED ORAL 2 TIMES DAILY
Qty: 14 TABLET | Refills: 0 | Status: SHIPPED | OUTPATIENT
Start: 2019-06-25 | End: 2019-07-25 | Stop reason: ALTCHOICE

## 2019-06-25 NOTE — PROGRESS NOTES
Urology  Postop check after check her lithotripsy  Consider role residual calculi along stent  Scheduled for holmium laser ureteroscopy with planned removal of stent  Preoperative instructions given  7 days of ciprofloxacin given patient is started on 7/7/2019  Dr Mae Montejo

## 2019-07-10 ENCOUNTER — ANESTHESIA EVENT (OUTPATIENT)
Dept: OPERATING ROOM | Age: 64
End: 2019-07-10
Payer: MEDICARE

## 2019-07-10 ENCOUNTER — APPOINTMENT (OUTPATIENT)
Dept: GENERAL RADIOLOGY | Age: 64
End: 2019-07-10
Attending: UROLOGY
Payer: MEDICARE

## 2019-07-10 ENCOUNTER — HOSPITAL ENCOUNTER (OUTPATIENT)
Age: 64
Setting detail: OUTPATIENT SURGERY
Discharge: HOME | End: 2019-07-10
Attending: UROLOGY | Admitting: UROLOGY
Payer: MEDICARE

## 2019-07-10 ENCOUNTER — ANESTHESIA (OUTPATIENT)
Dept: OPERATING ROOM | Age: 64
End: 2019-07-10
Payer: MEDICARE

## 2019-07-10 VITALS
OXYGEN SATURATION: 100 % | TEMPERATURE: 97.2 F | BODY MASS INDEX: 34.1 KG/M2 | HEART RATE: 67 BPM | WEIGHT: 225 LBS | SYSTOLIC BLOOD PRESSURE: 158 MMHG | RESPIRATION RATE: 16 BRPM | DIASTOLIC BLOOD PRESSURE: 85 MMHG | HEIGHT: 68 IN

## 2019-07-10 VITALS — OXYGEN SATURATION: 93 % | SYSTOLIC BLOOD PRESSURE: 124 MMHG | TEMPERATURE: 96.8 F | DIASTOLIC BLOOD PRESSURE: 84 MMHG

## 2019-07-10 LAB
ANION GAP SERPL CALCULATED.3IONS-SCNC: 15 MEQ/L (ref 9–15)
BUN BLDV-MCNC: 22 MG/DL (ref 8–23)
CALCIUM SERPL-MCNC: 9.8 MG/DL (ref 8.5–9.9)
CHLORIDE BLD-SCNC: 104 MEQ/L (ref 95–107)
CO2: 23 MEQ/L (ref 20–31)
CREAT SERPL-MCNC: 1.44 MG/DL (ref 0.7–1.2)
GFR AFRICAN AMERICAN: 59.4
GFR NON-AFRICAN AMERICAN: 49.1
GLUCOSE BLD-MCNC: 116 MG/DL (ref 70–99)
HCT VFR BLD CALC: 44.7 % (ref 42–52)
HEMOGLOBIN: 15.4 G/DL (ref 14–18)
MCH RBC QN AUTO: 31.8 PG (ref 27–31.3)
MCHC RBC AUTO-ENTMCNC: 34.4 % (ref 33–37)
MCV RBC AUTO: 92.4 FL (ref 80–100)
PDW BLD-RTO: 13.9 % (ref 11.5–14.5)
PLATELET # BLD: 267 K/UL (ref 130–400)
POTASSIUM SERPL-SCNC: 3.9 MEQ/L (ref 3.4–4.9)
RBC # BLD: 4.84 M/UL (ref 4.7–6.1)
SODIUM BLD-SCNC: 142 MEQ/L (ref 135–144)
WBC # BLD: 9.4 K/UL (ref 4.8–10.8)

## 2019-07-10 PROCEDURE — 3600000004 HC SURGERY LEVEL 4 BASE: Performed by: UROLOGY

## 2019-07-10 PROCEDURE — 7100000010 HC PHASE II RECOVERY - FIRST 15 MIN: Performed by: UROLOGY

## 2019-07-10 PROCEDURE — 6360000002 HC RX W HCPCS: Performed by: UROLOGY

## 2019-07-10 PROCEDURE — 2580000003 HC RX 258: Performed by: NURSE PRACTITIONER

## 2019-07-10 PROCEDURE — 7100000011 HC PHASE II RECOVERY - ADDTL 15 MIN: Performed by: UROLOGY

## 2019-07-10 PROCEDURE — C2625 STENT, NON-COR, TEM W/DEL SY: HCPCS | Performed by: UROLOGY

## 2019-07-10 PROCEDURE — 52356 CYSTO/URETERO W/LITHOTRIPSY: CPT | Performed by: UROLOGY

## 2019-07-10 PROCEDURE — 7100000000 HC PACU RECOVERY - FIRST 15 MIN: Performed by: UROLOGY

## 2019-07-10 PROCEDURE — 6370000000 HC RX 637 (ALT 250 FOR IP): Performed by: UROLOGY

## 2019-07-10 PROCEDURE — 2500000003 HC RX 250 WO HCPCS: Performed by: NURSE ANESTHETIST, CERTIFIED REGISTERED

## 2019-07-10 PROCEDURE — 6360000002 HC RX W HCPCS: Performed by: NURSE ANESTHETIST, CERTIFIED REGISTERED

## 2019-07-10 PROCEDURE — 2580000003 HC RX 258: Performed by: UROLOGY

## 2019-07-10 PROCEDURE — 3600000014 HC SURGERY LEVEL 4 ADDTL 15MIN: Performed by: UROLOGY

## 2019-07-10 PROCEDURE — 3700000001 HC ADD 15 MINUTES (ANESTHESIA): Performed by: UROLOGY

## 2019-07-10 PROCEDURE — 80048 BASIC METABOLIC PNL TOTAL CA: CPT

## 2019-07-10 PROCEDURE — 7100000001 HC PACU RECOVERY - ADDTL 15 MIN: Performed by: UROLOGY

## 2019-07-10 PROCEDURE — 3700000000 HC ANESTHESIA ATTENDED CARE: Performed by: UROLOGY

## 2019-07-10 PROCEDURE — C1769 GUIDE WIRE: HCPCS | Performed by: UROLOGY

## 2019-07-10 PROCEDURE — 74018 RADEX ABDOMEN 1 VIEW: CPT

## 2019-07-10 PROCEDURE — 2720000010 HC SURG SUPPLY STERILE: Performed by: UROLOGY

## 2019-07-10 PROCEDURE — 85027 COMPLETE CBC AUTOMATED: CPT

## 2019-07-10 PROCEDURE — 2709999900 HC NON-CHARGEABLE SUPPLY: Performed by: UROLOGY

## 2019-07-10 PROCEDURE — 6360000004 HC RX CONTRAST MEDICATION: Performed by: UROLOGY

## 2019-07-10 PROCEDURE — C1758 CATHETER, URETERAL: HCPCS | Performed by: UROLOGY

## 2019-07-10 PROCEDURE — 3209999900 FLUORO FOR SURGICAL PROCEDURES

## 2019-07-10 DEVICE — STENT URET 47FR L26CM DBL PIGTAILS LUBRICIOUS COAT TAPR TIP: Type: IMPLANTABLE DEVICE | Site: URETER | Status: FUNCTIONAL

## 2019-07-10 RX ORDER — FUROSEMIDE 10 MG/ML
INJECTION INTRAMUSCULAR; INTRAVENOUS PRN
Status: DISCONTINUED | OUTPATIENT
Start: 2019-07-10 | End: 2019-07-10 | Stop reason: SDUPTHER

## 2019-07-10 RX ORDER — FENTANYL CITRATE 50 UG/ML
50 INJECTION, SOLUTION INTRAMUSCULAR; INTRAVENOUS EVERY 10 MIN PRN
Status: DISCONTINUED | OUTPATIENT
Start: 2019-07-10 | End: 2019-07-10 | Stop reason: HOSPADM

## 2019-07-10 RX ORDER — HYDROCODONE BITARTRATE AND ACETAMINOPHEN 5; 325 MG/1; MG/1
2 TABLET ORAL PRN
Status: DISCONTINUED | OUTPATIENT
Start: 2019-07-10 | End: 2019-07-10 | Stop reason: HOSPADM

## 2019-07-10 RX ORDER — ONDANSETRON 2 MG/ML
INJECTION INTRAMUSCULAR; INTRAVENOUS PRN
Status: DISCONTINUED | OUTPATIENT
Start: 2019-07-10 | End: 2019-07-10 | Stop reason: SDUPTHER

## 2019-07-10 RX ORDER — FENTANYL CITRATE 50 UG/ML
INJECTION, SOLUTION INTRAMUSCULAR; INTRAVENOUS PRN
Status: DISCONTINUED | OUTPATIENT
Start: 2019-07-10 | End: 2019-07-10 | Stop reason: SDUPTHER

## 2019-07-10 RX ORDER — GLYCOPYRROLATE 1 MG/5 ML
SYRINGE (ML) INTRAVENOUS PRN
Status: DISCONTINUED | OUTPATIENT
Start: 2019-07-10 | End: 2019-07-10 | Stop reason: SDUPTHER

## 2019-07-10 RX ORDER — PROPOFOL 10 MG/ML
INJECTION, EMULSION INTRAVENOUS PRN
Status: DISCONTINUED | OUTPATIENT
Start: 2019-07-10 | End: 2019-07-10 | Stop reason: SDUPTHER

## 2019-07-10 RX ORDER — LIDOCAINE HYDROCHLORIDE 20 MG/ML
INJECTION, SOLUTION INFILTRATION; PERINEURAL PRN
Status: DISCONTINUED | OUTPATIENT
Start: 2019-07-10 | End: 2019-07-10 | Stop reason: SDUPTHER

## 2019-07-10 RX ORDER — MIDAZOLAM HYDROCHLORIDE 1 MG/ML
INJECTION INTRAMUSCULAR; INTRAVENOUS PRN
Status: DISCONTINUED | OUTPATIENT
Start: 2019-07-10 | End: 2019-07-10 | Stop reason: SDUPTHER

## 2019-07-10 RX ORDER — SODIUM CHLORIDE, SODIUM LACTATE, POTASSIUM CHLORIDE, CALCIUM CHLORIDE 600; 310; 30; 20 MG/100ML; MG/100ML; MG/100ML; MG/100ML
INJECTION, SOLUTION INTRAVENOUS CONTINUOUS
Status: DISCONTINUED | OUTPATIENT
Start: 2019-07-10 | End: 2019-07-10 | Stop reason: HOSPADM

## 2019-07-10 RX ORDER — SODIUM CHLORIDE 0.9 % (FLUSH) 0.9 %
10 SYRINGE (ML) INJECTION PRN
Status: DISCONTINUED | OUTPATIENT
Start: 2019-07-10 | End: 2019-07-10 | Stop reason: HOSPADM

## 2019-07-10 RX ORDER — SODIUM CHLORIDE 0.9 % (FLUSH) 0.9 %
10 SYRINGE (ML) INJECTION EVERY 12 HOURS SCHEDULED
Status: DISCONTINUED | OUTPATIENT
Start: 2019-07-10 | End: 2019-07-10 | Stop reason: HOSPADM

## 2019-07-10 RX ORDER — ONDANSETRON 2 MG/ML
4 INJECTION INTRAMUSCULAR; INTRAVENOUS
Status: DISCONTINUED | OUTPATIENT
Start: 2019-07-10 | End: 2019-07-10 | Stop reason: HOSPADM

## 2019-07-10 RX ORDER — DIPHENHYDRAMINE HYDROCHLORIDE 50 MG/ML
12.5 INJECTION INTRAMUSCULAR; INTRAVENOUS
Status: DISCONTINUED | OUTPATIENT
Start: 2019-07-10 | End: 2019-07-10 | Stop reason: HOSPADM

## 2019-07-10 RX ORDER — MAGNESIUM HYDROXIDE 1200 MG/15ML
LIQUID ORAL PRN
Status: DISCONTINUED | OUTPATIENT
Start: 2019-07-10 | End: 2019-07-10 | Stop reason: ALTCHOICE

## 2019-07-10 RX ORDER — LIDOCAINE HYDROCHLORIDE 10 MG/ML
1 INJECTION, SOLUTION EPIDURAL; INFILTRATION; INTRACAUDAL; PERINEURAL
Status: DISCONTINUED | OUTPATIENT
Start: 2019-07-10 | End: 2019-07-10 | Stop reason: HOSPADM

## 2019-07-10 RX ORDER — MEPERIDINE HYDROCHLORIDE 25 MG/ML
12.5 INJECTION INTRAMUSCULAR; INTRAVENOUS; SUBCUTANEOUS EVERY 5 MIN PRN
Status: DISCONTINUED | OUTPATIENT
Start: 2019-07-10 | End: 2019-07-10 | Stop reason: HOSPADM

## 2019-07-10 RX ORDER — HYDROCODONE BITARTRATE AND ACETAMINOPHEN 5; 325 MG/1; MG/1
1 TABLET ORAL PRN
Status: DISCONTINUED | OUTPATIENT
Start: 2019-07-10 | End: 2019-07-10 | Stop reason: HOSPADM

## 2019-07-10 RX ORDER — CHLORHEXIDINE GLUCONATE 4 G/100ML
SOLUTION TOPICAL PRN
Status: DISCONTINUED | OUTPATIENT
Start: 2019-07-10 | End: 2019-07-10 | Stop reason: ALTCHOICE

## 2019-07-10 RX ORDER — DEXAMETHASONE SODIUM PHOSPHATE 4 MG/ML
INJECTION, SOLUTION INTRA-ARTICULAR; INTRALESIONAL; INTRAMUSCULAR; INTRAVENOUS; SOFT TISSUE PRN
Status: DISCONTINUED | OUTPATIENT
Start: 2019-07-10 | End: 2019-07-10 | Stop reason: SDUPTHER

## 2019-07-10 RX ORDER — METOCLOPRAMIDE HYDROCHLORIDE 5 MG/ML
10 INJECTION INTRAMUSCULAR; INTRAVENOUS
Status: DISCONTINUED | OUTPATIENT
Start: 2019-07-10 | End: 2019-07-10 | Stop reason: HOSPADM

## 2019-07-10 RX ADMIN — PROPOFOL 200 MG: 10 INJECTION, EMULSION INTRAVENOUS at 07:28

## 2019-07-10 RX ADMIN — Medication 0.2 MG: at 07:36

## 2019-07-10 RX ADMIN — PHENYLEPHRINE HYDROCHLORIDE 100 MCG: 10 INJECTION INTRAVENOUS at 07:45

## 2019-07-10 RX ADMIN — PHENYLEPHRINE HYDROCHLORIDE 50 MCG: 10 INJECTION INTRAVENOUS at 08:03

## 2019-07-10 RX ADMIN — LIDOCAINE HYDROCHLORIDE 60 MG: 20 INJECTION, SOLUTION INFILTRATION; PERINEURAL at 07:28

## 2019-07-10 RX ADMIN — MIDAZOLAM HYDROCHLORIDE 2 MG: 1 INJECTION, SOLUTION INTRAMUSCULAR; INTRAVENOUS at 07:24

## 2019-07-10 RX ADMIN — DEXAMETHASONE SODIUM PHOSPHATE 4 MG: 4 INJECTION, SOLUTION INTRA-ARTICULAR; INTRALESIONAL; INTRAMUSCULAR; INTRAVENOUS; SOFT TISSUE at 07:34

## 2019-07-10 RX ADMIN — FENTANYL CITRATE 25 MCG: 50 INJECTION, SOLUTION INTRAMUSCULAR; INTRAVENOUS at 07:28

## 2019-07-10 RX ADMIN — SODIUM CHLORIDE, POTASSIUM CHLORIDE, SODIUM LACTATE AND CALCIUM CHLORIDE: 600; 310; 30; 20 INJECTION, SOLUTION INTRAVENOUS at 06:39

## 2019-07-10 RX ADMIN — PHENYLEPHRINE HYDROCHLORIDE 100 MCG: 10 INJECTION INTRAVENOUS at 07:41

## 2019-07-10 RX ADMIN — ONDANSETRON 4 MG: 2 INJECTION INTRAMUSCULAR; INTRAVENOUS at 07:34

## 2019-07-10 RX ADMIN — PHENYLEPHRINE HYDROCHLORIDE 100 MCG: 10 INJECTION INTRAVENOUS at 07:36

## 2019-07-10 RX ADMIN — FENTANYL CITRATE 25 MCG: 50 INJECTION, SOLUTION INTRAMUSCULAR; INTRAVENOUS at 09:23

## 2019-07-10 RX ADMIN — FUROSEMIDE 10 MG: 10 INJECTION, SOLUTION INTRAVENOUS at 08:40

## 2019-07-10 RX ADMIN — PHENYLEPHRINE HYDROCHLORIDE 50 MCG: 10 INJECTION INTRAVENOUS at 08:42

## 2019-07-10 RX ADMIN — FENTANYL CITRATE 25 MCG: 50 INJECTION, SOLUTION INTRAMUSCULAR; INTRAVENOUS at 07:52

## 2019-07-10 RX ADMIN — GENTAMICIN SULFATE 120 MG: 40 INJECTION, SOLUTION INTRAMUSCULAR; INTRAVENOUS at 07:34

## 2019-07-10 RX ADMIN — SODIUM CHLORIDE, POTASSIUM CHLORIDE, SODIUM LACTATE AND CALCIUM CHLORIDE: 600; 310; 30; 20 INJECTION, SOLUTION INTRAVENOUS at 09:20

## 2019-07-10 ASSESSMENT — PULMONARY FUNCTION TESTS
PIF_VALUE: 1
PIF_VALUE: 14
PIF_VALUE: 15
PIF_VALUE: 14
PIF_VALUE: 15
PIF_VALUE: 14
PIF_VALUE: 5
PIF_VALUE: 3
PIF_VALUE: 14
PIF_VALUE: 15
PIF_VALUE: 14
PIF_VALUE: 12
PIF_VALUE: 14
PIF_VALUE: 1
PIF_VALUE: 15
PIF_VALUE: 14
PIF_VALUE: 15
PIF_VALUE: 14
PIF_VALUE: 1
PIF_VALUE: 14
PIF_VALUE: 14
PIF_VALUE: 2
PIF_VALUE: 1
PIF_VALUE: 14
PIF_VALUE: 15
PIF_VALUE: 14
PIF_VALUE: 4
PIF_VALUE: 14
PIF_VALUE: 15
PIF_VALUE: 14
PIF_VALUE: 27
PIF_VALUE: 14
PIF_VALUE: 2
PIF_VALUE: 15
PIF_VALUE: 14
PIF_VALUE: 4
PIF_VALUE: 14
PIF_VALUE: 1
PIF_VALUE: 14
PIF_VALUE: 15
PIF_VALUE: 14

## 2019-07-10 ASSESSMENT — PAIN - FUNCTIONAL ASSESSMENT: PAIN_FUNCTIONAL_ASSESSMENT: 0-10

## 2019-07-10 ASSESSMENT — PAIN SCALES - GENERAL: PAINLEVEL_OUTOF10: 0

## 2019-07-10 NOTE — BRIEF OP NOTE
Brief Postoperative Note  ______________________________________________________________    Patient: Lamont Lewis  YOB: 1955  MRN: [id number]  Date of Procedure: 7/10/2019    Pre-Op Diagnosis: RIGHT RENAL CALCULUS    Post-Op Diagnosis: Same       Procedure(s): FLEXIBLE URETEROSCOPY  HOLMIUM  LASER LITHOTRIPSY REMOVAL OF STENT, UPDATE PAT ON ADMIT, PHONE PAT, KUB ON ARRIVAL    Anesthesia: General    Surgeon(s):  Cristopher Martino MD    Assistant:       Estimated Blood Loss (mL): less than 50     Complications: None    Specimens:   ID Type Source Tests Collected by Time Destination   A :  Tissue Ureter SURGICAL PATHOLOGY Cristopher Martino MD 7/10/2019 5616        Implants:  Implant Name Type Inv. Item Serial No.  Lot No. LRB No. Used   STENT URET W/O GUIDEWIRE 1USE 4. [id number] Stent:Urological STENT URET W/O GUIDEWIRE 1USE 4. [id number]  [facility] [employer] [id number] Right 1         Drains: * No LDAs found *    Findings: 4.7 Fr stent placed/fragments removed    Cristopher Martino MD  Date: 7/10/2019  Time: 9:30 AM

## 2019-07-11 ENCOUNTER — TELEPHONE (OUTPATIENT)
Dept: UROLOGY | Age: 64
End: 2019-07-11

## 2019-07-11 DIAGNOSIS — N20.0 KIDNEY STONE: Primary | ICD-10-CM

## 2019-07-14 LAB
CALCULI COMPOSITION: NORMAL
MASS: 86 MG
STONE DESCRIPTION: NORMAL
STONE NUMBER: NORMAL
STONE SIZE: NORMAL MM

## 2019-07-25 ENCOUNTER — HOSPITAL ENCOUNTER (OUTPATIENT)
Dept: GENERAL RADIOLOGY | Age: 64
Discharge: HOME | End: 2019-07-27
Payer: MEDICARE

## 2019-07-25 ENCOUNTER — PROCEDURE VISIT (OUTPATIENT)
Dept: UROLOGY | Age: 64
End: 2019-07-25

## 2019-07-25 VITALS
SYSTOLIC BLOOD PRESSURE: 134 MMHG | HEART RATE: 64 BPM | WEIGHT: 225 LBS | BODY MASS INDEX: 34.1 KG/M2 | HEIGHT: 68 IN | DIASTOLIC BLOOD PRESSURE: 86 MMHG

## 2019-07-25 DIAGNOSIS — N20.0 KIDNEY STONE: Primary | ICD-10-CM

## 2019-07-25 DIAGNOSIS — N20.0 KIDNEY STONES: ICD-10-CM

## 2019-07-25 PROCEDURE — 99024 POSTOP FOLLOW-UP VISIT: CPT | Performed by: UROLOGY

## 2019-07-25 PROCEDURE — 74018 RADEX ABDOMEN 1 VIEW: CPT

## 2019-07-25 RX ORDER — TAMSULOSIN HYDROCHLORIDE 0.4 MG/1
0.4 CAPSULE ORAL DAILY
Qty: 30 CAPSULE | Refills: 1 | Status: SHIPPED | OUTPATIENT
Start: 2019-07-25 | End: 2019-09-06 | Stop reason: ALTCHOICE

## 2019-07-30 ENCOUNTER — ANESTHESIA EVENT (OUTPATIENT)
Dept: OPERATING ROOM | Age: 64
End: 2019-07-30
Payer: MEDICARE

## 2019-07-31 ENCOUNTER — HOSPITAL ENCOUNTER (OUTPATIENT)
Age: 64
Setting detail: OUTPATIENT SURGERY
Discharge: HOME | End: 2019-07-31
Attending: UROLOGY | Admitting: UROLOGY
Payer: MEDICARE

## 2019-07-31 ENCOUNTER — APPOINTMENT (OUTPATIENT)
Dept: GENERAL RADIOLOGY | Age: 64
End: 2019-07-31
Attending: UROLOGY
Payer: MEDICARE

## 2019-07-31 ENCOUNTER — ANESTHESIA (OUTPATIENT)
Dept: OPERATING ROOM | Age: 64
End: 2019-07-31
Payer: MEDICARE

## 2019-07-31 VITALS
DIASTOLIC BLOOD PRESSURE: 78 MMHG | OXYGEN SATURATION: 98 % | HEIGHT: 68 IN | RESPIRATION RATE: 16 BRPM | TEMPERATURE: 97 F | BODY MASS INDEX: 34.1 KG/M2 | SYSTOLIC BLOOD PRESSURE: 144 MMHG | WEIGHT: 225 LBS | HEART RATE: 59 BPM

## 2019-07-31 VITALS — DIASTOLIC BLOOD PRESSURE: 63 MMHG | TEMPERATURE: 94.8 F | OXYGEN SATURATION: 94 % | SYSTOLIC BLOOD PRESSURE: 109 MMHG

## 2019-07-31 PROCEDURE — C1769 GUIDE WIRE: HCPCS | Performed by: UROLOGY

## 2019-07-31 PROCEDURE — 7100000010 HC PHASE II RECOVERY - FIRST 15 MIN: Performed by: UROLOGY

## 2019-07-31 PROCEDURE — 3700000000 HC ANESTHESIA ATTENDED CARE: Performed by: UROLOGY

## 2019-07-31 PROCEDURE — C2625 STENT, NON-COR, TEM W/DEL SY: HCPCS | Performed by: UROLOGY

## 2019-07-31 PROCEDURE — 2580000003 HC RX 258: Performed by: UROLOGY

## 2019-07-31 PROCEDURE — 2709999900 HC NON-CHARGEABLE SUPPLY: Performed by: UROLOGY

## 2019-07-31 PROCEDURE — 3600000014 HC SURGERY LEVEL 4 ADDTL 15MIN: Performed by: UROLOGY

## 2019-07-31 PROCEDURE — 2500000003 HC RX 250 WO HCPCS: Performed by: NURSE ANESTHETIST, CERTIFIED REGISTERED

## 2019-07-31 PROCEDURE — 3209999900 FLUORO FOR SURGICAL PROCEDURES

## 2019-07-31 PROCEDURE — 74018 RADEX ABDOMEN 1 VIEW: CPT

## 2019-07-31 PROCEDURE — 6370000000 HC RX 637 (ALT 250 FOR IP): Performed by: UROLOGY

## 2019-07-31 PROCEDURE — 2580000003 HC RX 258: Performed by: NURSE PRACTITIONER

## 2019-07-31 PROCEDURE — 2500000003 HC RX 250 WO HCPCS: Performed by: NURSE PRACTITIONER

## 2019-07-31 PROCEDURE — C1758 CATHETER, URETERAL: HCPCS | Performed by: UROLOGY

## 2019-07-31 PROCEDURE — 6360000002 HC RX W HCPCS: Performed by: NURSE ANESTHETIST, CERTIFIED REGISTERED

## 2019-07-31 PROCEDURE — C1894 INTRO/SHEATH, NON-LASER: HCPCS | Performed by: UROLOGY

## 2019-07-31 PROCEDURE — 52356 CYSTO/URETERO W/LITHOTRIPSY: CPT | Performed by: UROLOGY

## 2019-07-31 PROCEDURE — 7100000001 HC PACU RECOVERY - ADDTL 15 MIN: Performed by: UROLOGY

## 2019-07-31 PROCEDURE — 7100000000 HC PACU RECOVERY - FIRST 15 MIN: Performed by: UROLOGY

## 2019-07-31 PROCEDURE — 3700000001 HC ADD 15 MINUTES (ANESTHESIA): Performed by: UROLOGY

## 2019-07-31 PROCEDURE — 6360000002 HC RX W HCPCS: Performed by: UROLOGY

## 2019-07-31 PROCEDURE — 2720000010 HC SURG SUPPLY STERILE: Performed by: UROLOGY

## 2019-07-31 PROCEDURE — 3600000004 HC SURGERY LEVEL 4 BASE: Performed by: UROLOGY

## 2019-07-31 PROCEDURE — 82365 CALCULUS SPECTROSCOPY: CPT

## 2019-07-31 DEVICE — STENT URET 47FR L26CM DBL PIGTAILS LUBRICIOUS COAT TAPR TIP: Type: IMPLANTABLE DEVICE | Site: URETER | Status: FUNCTIONAL

## 2019-07-31 RX ORDER — MEPERIDINE HYDROCHLORIDE 25 MG/ML
12.5 INJECTION INTRAMUSCULAR; INTRAVENOUS; SUBCUTANEOUS EVERY 5 MIN PRN
Status: DISCONTINUED | OUTPATIENT
Start: 2019-07-31 | End: 2019-07-31 | Stop reason: HOSPADM

## 2019-07-31 RX ORDER — FENTANYL CITRATE 50 UG/ML
50 INJECTION, SOLUTION INTRAMUSCULAR; INTRAVENOUS EVERY 10 MIN PRN
Status: DISCONTINUED | OUTPATIENT
Start: 2019-07-31 | End: 2019-07-31 | Stop reason: HOSPADM

## 2019-07-31 RX ORDER — SULFAMETHOXAZOLE AND TRIMETHOPRIM 800; 160 MG/1; MG/1
1 TABLET ORAL 2 TIMES DAILY
Qty: 14 TABLET | Refills: 0 | Status: SHIPPED | OUTPATIENT
Start: 2019-07-31 | End: 2019-08-07

## 2019-07-31 RX ORDER — HYDROCODONE BITARTRATE AND ACETAMINOPHEN 5; 325 MG/1; MG/1
2 TABLET ORAL PRN
Status: DISCONTINUED | OUTPATIENT
Start: 2019-07-31 | End: 2019-07-31 | Stop reason: HOSPADM

## 2019-07-31 RX ORDER — MIDAZOLAM HYDROCHLORIDE 1 MG/ML
INJECTION INTRAMUSCULAR; INTRAVENOUS PRN
Status: DISCONTINUED | OUTPATIENT
Start: 2019-07-31 | End: 2019-07-31 | Stop reason: SDUPTHER

## 2019-07-31 RX ORDER — SODIUM CHLORIDE 0.9 % (FLUSH) 0.9 %
10 SYRINGE (ML) INJECTION EVERY 12 HOURS SCHEDULED
Status: DISCONTINUED | OUTPATIENT
Start: 2019-07-31 | End: 2019-07-31 | Stop reason: HOSPADM

## 2019-07-31 RX ORDER — DEXAMETHASONE SODIUM PHOSPHATE 10 MG/ML
INJECTION INTRAMUSCULAR; INTRAVENOUS PRN
Status: DISCONTINUED | OUTPATIENT
Start: 2019-07-31 | End: 2019-07-31 | Stop reason: SDUPTHER

## 2019-07-31 RX ORDER — ONDANSETRON 2 MG/ML
4 INJECTION INTRAMUSCULAR; INTRAVENOUS
Status: DISCONTINUED | OUTPATIENT
Start: 2019-07-31 | End: 2019-07-31 | Stop reason: HOSPADM

## 2019-07-31 RX ORDER — SODIUM CHLORIDE 0.9 % (FLUSH) 0.9 %
10 SYRINGE (ML) INJECTION PRN
Status: DISCONTINUED | OUTPATIENT
Start: 2019-07-31 | End: 2019-07-31 | Stop reason: HOSPADM

## 2019-07-31 RX ORDER — LIDOCAINE HYDROCHLORIDE 20 MG/ML
INJECTION, SOLUTION INFILTRATION; PERINEURAL PRN
Status: DISCONTINUED | OUTPATIENT
Start: 2019-07-31 | End: 2019-07-31 | Stop reason: SDUPTHER

## 2019-07-31 RX ORDER — FUROSEMIDE 10 MG/ML
INJECTION INTRAMUSCULAR; INTRAVENOUS PRN
Status: DISCONTINUED | OUTPATIENT
Start: 2019-07-31 | End: 2019-07-31 | Stop reason: SDUPTHER

## 2019-07-31 RX ORDER — METOCLOPRAMIDE HYDROCHLORIDE 5 MG/ML
10 INJECTION INTRAMUSCULAR; INTRAVENOUS
Status: DISCONTINUED | OUTPATIENT
Start: 2019-07-31 | End: 2019-07-31 | Stop reason: HOSPADM

## 2019-07-31 RX ORDER — CHLORHEXIDINE GLUCONATE 4 G/100ML
SOLUTION TOPICAL PRN
Status: DISCONTINUED | OUTPATIENT
Start: 2019-07-31 | End: 2019-07-31 | Stop reason: ALTCHOICE

## 2019-07-31 RX ORDER — SODIUM CHLORIDE, SODIUM LACTATE, POTASSIUM CHLORIDE, CALCIUM CHLORIDE 600; 310; 30; 20 MG/100ML; MG/100ML; MG/100ML; MG/100ML
INJECTION, SOLUTION INTRAVENOUS CONTINUOUS
Status: DISCONTINUED | OUTPATIENT
Start: 2019-07-31 | End: 2019-07-31 | Stop reason: HOSPADM

## 2019-07-31 RX ORDER — LIDOCAINE HYDROCHLORIDE 10 MG/ML
1 INJECTION, SOLUTION EPIDURAL; INFILTRATION; INTRACAUDAL; PERINEURAL
Status: COMPLETED | OUTPATIENT
Start: 2019-07-31 | End: 2019-07-31

## 2019-07-31 RX ORDER — HYDROCODONE BITARTRATE AND ACETAMINOPHEN 5; 325 MG/1; MG/1
1 TABLET ORAL PRN
Status: DISCONTINUED | OUTPATIENT
Start: 2019-07-31 | End: 2019-07-31 | Stop reason: HOSPADM

## 2019-07-31 RX ORDER — PROPOFOL 10 MG/ML
INJECTION, EMULSION INTRAVENOUS PRN
Status: DISCONTINUED | OUTPATIENT
Start: 2019-07-31 | End: 2019-07-31 | Stop reason: SDUPTHER

## 2019-07-31 RX ORDER — FENTANYL CITRATE 50 UG/ML
INJECTION, SOLUTION INTRAMUSCULAR; INTRAVENOUS PRN
Status: DISCONTINUED | OUTPATIENT
Start: 2019-07-31 | End: 2019-07-31 | Stop reason: SDUPTHER

## 2019-07-31 RX ORDER — DIPHENHYDRAMINE HYDROCHLORIDE 50 MG/ML
12.5 INJECTION INTRAMUSCULAR; INTRAVENOUS
Status: DISCONTINUED | OUTPATIENT
Start: 2019-07-31 | End: 2019-07-31 | Stop reason: HOSPADM

## 2019-07-31 RX ORDER — MAGNESIUM HYDROXIDE 1200 MG/15ML
LIQUID ORAL PRN
Status: DISCONTINUED | OUTPATIENT
Start: 2019-07-31 | End: 2019-07-31 | Stop reason: ALTCHOICE

## 2019-07-31 RX ORDER — ONDANSETRON 2 MG/ML
INJECTION INTRAMUSCULAR; INTRAVENOUS PRN
Status: DISCONTINUED | OUTPATIENT
Start: 2019-07-31 | End: 2019-07-31 | Stop reason: SDUPTHER

## 2019-07-31 RX ADMIN — GENTAMICIN SULFATE 120 MG: 40 INJECTION, SOLUTION INTRAMUSCULAR; INTRAVENOUS at 07:30

## 2019-07-31 RX ADMIN — LIDOCAINE HYDROCHLORIDE 0.1 ML: 10 INJECTION, SOLUTION EPIDURAL; INFILTRATION; INTRACAUDAL; PERINEURAL at 06:31

## 2019-07-31 RX ADMIN — FENTANYL CITRATE 25 MCG: 50 INJECTION, SOLUTION INTRAMUSCULAR; INTRAVENOUS at 08:21

## 2019-07-31 RX ADMIN — PROPOFOL 120 MG: 10 INJECTION, EMULSION INTRAVENOUS at 07:36

## 2019-07-31 RX ADMIN — FENTANYL CITRATE 25 MCG: 50 INJECTION, SOLUTION INTRAMUSCULAR; INTRAVENOUS at 08:23

## 2019-07-31 RX ADMIN — SODIUM CHLORIDE, POTASSIUM CHLORIDE, SODIUM LACTATE AND CALCIUM CHLORIDE: 600; 310; 30; 20 INJECTION, SOLUTION INTRAVENOUS at 08:56

## 2019-07-31 RX ADMIN — FUROSEMIDE 10 MG: 10 INJECTION, SOLUTION INTRAVENOUS at 08:37

## 2019-07-31 RX ADMIN — FENTANYL CITRATE 25 MCG: 50 INJECTION, SOLUTION INTRAMUSCULAR; INTRAVENOUS at 08:17

## 2019-07-31 RX ADMIN — SODIUM CHLORIDE, POTASSIUM CHLORIDE, SODIUM LACTATE AND CALCIUM CHLORIDE: 600; 310; 30; 20 INJECTION, SOLUTION INTRAVENOUS at 06:32

## 2019-07-31 RX ADMIN — LIDOCAINE HYDROCHLORIDE 60 MG: 20 INJECTION, SOLUTION INFILTRATION; PERINEURAL at 07:36

## 2019-07-31 RX ADMIN — DEXAMETHASONE SODIUM PHOSPHATE 10 MG: 10 INJECTION INTRAMUSCULAR; INTRAVENOUS at 07:40

## 2019-07-31 RX ADMIN — ONDANSETRON 4 MG: 2 INJECTION INTRAMUSCULAR; INTRAVENOUS at 09:36

## 2019-07-31 RX ADMIN — MIDAZOLAM HYDROCHLORIDE 2 MG: 1 INJECTION, SOLUTION INTRAMUSCULAR; INTRAVENOUS at 07:29

## 2019-07-31 RX ADMIN — FENTANYL CITRATE 25 MCG: 50 INJECTION, SOLUTION INTRAMUSCULAR; INTRAVENOUS at 07:44

## 2019-07-31 ASSESSMENT — PULMONARY FUNCTION TESTS
PIF_VALUE: 4
PIF_VALUE: 3
PIF_VALUE: 4
PIF_VALUE: 4
PIF_VALUE: 3
PIF_VALUE: 1
PIF_VALUE: 4
PIF_VALUE: 4
PIF_VALUE: 3
PIF_VALUE: 4
PIF_VALUE: 3
PIF_VALUE: 4
PIF_VALUE: 3
PIF_VALUE: 4
PIF_VALUE: 0
PIF_VALUE: 3
PIF_VALUE: 3
PIF_VALUE: 4
PIF_VALUE: 4
PIF_VALUE: 3
PIF_VALUE: 4
PIF_VALUE: 3
PIF_VALUE: 4
PIF_VALUE: 3
PIF_VALUE: 4
PIF_VALUE: 3
PIF_VALUE: 4
PIF_VALUE: 3
PIF_VALUE: 5
PIF_VALUE: 4
PIF_VALUE: 3
PIF_VALUE: 5
PIF_VALUE: 3
PIF_VALUE: 4
PIF_VALUE: 3
PIF_VALUE: 0
PIF_VALUE: 3
PIF_VALUE: 1
PIF_VALUE: 4
PIF_VALUE: 4
PIF_VALUE: 3
PIF_VALUE: 5
PIF_VALUE: 4
PIF_VALUE: 4
PIF_VALUE: 3
PIF_VALUE: 4
PIF_VALUE: 3
PIF_VALUE: 4
PIF_VALUE: 4
PIF_VALUE: 3
PIF_VALUE: 4
PIF_VALUE: 0
PIF_VALUE: 3
PIF_VALUE: 4
PIF_VALUE: 3
PIF_VALUE: 4
PIF_VALUE: 3
PIF_VALUE: 4
PIF_VALUE: 3
PIF_VALUE: 4
PIF_VALUE: 5
PIF_VALUE: 3
PIF_VALUE: 22
PIF_VALUE: 3
PIF_VALUE: 4
PIF_VALUE: 3
PIF_VALUE: 1
PIF_VALUE: 3
PIF_VALUE: 4
PIF_VALUE: 3
PIF_VALUE: 4
PIF_VALUE: 4
PIF_VALUE: 3
PIF_VALUE: 4
PIF_VALUE: 3
PIF_VALUE: 3
PIF_VALUE: 4
PIF_VALUE: 3
PIF_VALUE: 4
PIF_VALUE: 3
PIF_VALUE: 4
PIF_VALUE: 3
PIF_VALUE: 3
PIF_VALUE: 4
PIF_VALUE: 3
PIF_VALUE: 1
PIF_VALUE: 4
PIF_VALUE: 3
PIF_VALUE: 4
PIF_VALUE: 3
PIF_VALUE: 2
PIF_VALUE: 4
PIF_VALUE: 4

## 2019-07-31 ASSESSMENT — PAIN - FUNCTIONAL ASSESSMENT: PAIN_FUNCTIONAL_ASSESSMENT: 0-10

## 2019-07-31 NOTE — OP NOTE
Usha Savage [address] [place] [address], [id number] [facility]                                OPERATIVE REPORT    PATIENT NAME: Buffy Handy                 :        1955  MED REC NO:   [id number]                            ROOM:  ACCOUNT NO:   [de-identified]                           ADMIT DATE: 2019  PROVIDER:     Major Roberson MD    DATE OF PROCEDURE:  2019    PREOPERATIVE DIAGNOSES:  Large renal calculus and multiple left ureteral  calculi along stent, history of large right renal pelvic stone. POSTOPERATIVE DIAGNOSES:  Large renal calculus and multiple left  ureteral calculi along stent, history of large right renal pelvic stone. OPERATIONS PERFORMED:  Ureteroscopy, holmium laser lithotripsy, removal  of fragments, right double-J stent placement. SURGEON:  Maxwell Pack. Marisol Lau MD.    ANESTHESIA:  General.    INDICATIONS:  The patient is a 68-year-old male who initially reported  with a 1.4-cm stone right renal pelvis and the smaller stone in the  right lower pole. The patient had shockwave lithotripsy with limited  success which required ureteroscopy and treat the fragments along the  stent. The fragments remained large and attempt will be made to remove  the stent today along with treating the smaller stones and removing  them. The patient understands the risks and benefits with potentially  reinsertion of stent. OPERATIVE NOTE:  The patient was given IV antibiotics preoperatively. He was taken to the operating room and placed up on the table in the  dorsal lithotomy position after initiation of general anesthetic. A  21-Telugu cystoscope was used. The stent was visualized and removed. Then a dual lumen catheter was placed and two ureteral wires were placed  into the right collecting system under fluoroscopy. Then a ureteroscopy  sheath was placed into the distal ureter.   A LithoVue flexible  ureteroscope was used to isolate

## 2019-07-31 NOTE — H&P
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   Social Needs    Financial resource strain: Not on file    Food insecurity:     Worry: Not on file     Inability: Not on file   [employer] needs:     Medical: Not on file     Non-medical: Not on file   Tobacco Use    Smoking status: Never Smoker    Smokeless tobacco: Current User     Types: Chew   Substance and Sexual Activity    Alcohol use: No    Drug use: No    Sexual activity: Never   Lifestyle    Physical activity:     Days per week: Not on file     Minutes per session: Not on file    Stress: Not on file   Relationships    Social connections:     Talks on phone: Not on file     Gets together: Not on file     Attends [employer]ology service: Not on file     Active member of club or organization: Not on file     Attends meetings of clubs or organizations: Not on file     Relationship status: Not on file    Intimate partner violence:     Fear of current or ex partner: Not on file     Emotionally abused: Not on file     Physically abused: Not on file     Forced sexual activity: Not on file   Other Topics Concern    Not on file   Social History Narrative    Not on file     Family History   Problem Relation Age of Onset    Alzheimer's Disease Mother     Cancer Father         leukemia      Current Facility-Administered Medications   Medication Dose Route Frequency Provider Last Rate Last Dose    lactated ringers infusion   Intravenous Continuous MARI Vivar  mL/hr at 07/31/19 3280      sodium chloride flush 0.9 % injection 10 mL  10 mL Intravenous 2 times per day MARI Porter CNP        sodium chloride flush 0.9 % injection 10 mL  10 mL Intravenous PRN MARI Porter CNP        gentamicin (GARAMYCIN) 120 mg in dextrose 5 % 100 mL IVPB  120 mg Intravenous On Call to Izabel Daugherty MD        fentaNYL (SUBLIMAZE) injection 50 mcg  50 mcg Intravenous Q10 Min PRN Godfrey Wallington, DO        HYDROmorphone (DILAUDID) injection 0.5 mg  0.5 mg Intravenous Q10 Min PRN Godfrey Vince, DO        HYDROcodone-acetaminophen (NORCO) 5-325 MG per tablet 1 tablet  1 tablet Oral PRN Trudee Plume Carlotta, DO        Or    HYDROcodone-acetaminophen (NORCO) 5-325 MG per tablet 2 tablet  2 tablet Oral PRN Trudee Plume Carlotta, DO        diphenhydrAMINE (BENADRYL) injection 12.5 mg  12.5 mg Intravenous Once PRN Godfrey Wallington, DO        ondansetron [facility] injection 4 mg  4 mg Intravenous Once PRN Godfrey Wallington, DO        metoclopramide (REGLAN) injection 10 mg  10 mg Intravenous Once PRN Godfrey Vince, DO        meperidine (DEMEROL) injection 12.5 mg  12.5 mg Intravenous Q5 Min PRN Godfrey Vince, DO         Patient has no known allergies. All reviewed and verified by Dr Jackeline Comer on today's visit    PSA   Date Value Ref Range Status   10/02/2018 0.97 0.00 - 5.40 ng/mL Final     Comment:     When the Total PSA is between 3.00 and 10.00 ng/mL, consider  requesting a Free PSA to aid in diagnosis. [unfilled]    Physical Exam  Vitals:    07/31/19 0611   BP: (!) 146/85   Pulse: 65   Resp: 16   Temp: 97.4 °F (36.3 °C)   TempSrc: Temporal   SpO2: 96%   Weight: 225 lb (102.1 kg)   Height: 5' 7.5\" (1.715 m)     Constitutional: patient is oriented to person, place, and time. patient appears well-developed. Not in distress. Ears: Adequate hearing/no hearing loss  Head: Normocephalic. Atraumatic  Neck: Normal range of motion. Cardiovascular: Normal rate, BP reviewed. Number of Occurrences:   1    Notify physician for     Notify physician for pulse less than 50 or greater than 120, respiratory rate less than 12 or greater than 25, temperature greater than 101.3 F (04.7 C), systolic BP less than 90 or greater than 985, diastolic BP less than 50 or greater than 100. Standing Status:   Standing     Number of Occurrences:   1    Vital signs per unit routine     Standing Status:   Standing     Number of Occurrences:   1    Notify anesthesia provider     Notify anesthesia provider for pulse less than 50 or greater than 120, respiratory rate less than 8 or greater than 25, temperature greater than 101.3 F (38.5 C) , urinary output less than 30 ml/hr, systolic BP less than 90 or greater than 914, diastolic BP less than 60 or greater than 100, pulse oximetry less than 92%. Standing Status:   Standing     Number of Occurrences:   1    Phase I - bedrest     Patient must remain on bedrest until motor and sensory function is back to baseline. Must be assisted the first time the patient is ambulated. Standing Status:   Standing     Number of Occurrences:   1    Phase I - warming device     May be applied for temperatue less than 35C (95 F), or if patient is symptomatic. Core body temp equivalents (patients are hypothermic if temperture equal to or less than these readings): 1) Oral temperature equal to  35.8C (96.4F). (Temporal temperture equivalent to oral values if temporal thermometer is labeled Arterial/Oral). 2) Bladder temperature equal to 36.3C (97.3F). 4) Axillary temperature equal to  34.5C (94.1F). 5) Rectal temperature     Standing Status:   Standing     Number of Occurrences:   30735    Phase I - cardiac monitor     Standing Status:   Standing     Number of Occurrences:   92304    Phase I & II - check level of sensory block     1) For patients with subarachnoid and/or epidural blocks, document level of sensation and subsequent changes with vital signs.  2) Notify anesthesiologist if no change in level. 3) Document status of regional block on admission and at discharge. Standing Status:   Standing     Number of Occurrences:   1    Phase I & II - femoral nerve block     Assess for strength and provide appropriate assistance during ambulation postop, if applicable. Standing Status:   Standing     Number of Occurrences:   1    Phase I & II - neurological checks     With vital signs as appropriate. Standing Status:   Standing     Number of Occurrences:   1    Phase I & II - IV infusion rate     1) Run present IV at 100 mL/hour, unless otherwise ordered by surgeon. Standing Status:   Standing     Number of Occurrences:   1    Nursing communication - Discharge from PACU     May discharge patient from PACU when criteria met. Standing Status:   Standing     Number of Occurrences:   1    Encourage deep breathing and coughing     Standing Status:   Standing     Number of Occurrences:   1    Continuous Pulse Oximetry     Document with vital signs and PRN     Standing Status:   Standing     Number of Occurrences:   1    Initiate Oxygen Therapy Protocol     Initiate oxygen therapy if the patient has 1) SpO2 is less than 90%, 2) Cyanosis, Chest Pain, Dyspnea, or Altered level of consciousness AND SpO2 checked and it is less than 90%, or 3) patient on Home oxygen. To initiate oxygen therapy: nurse enters RT51 Nasal cannula oxygen order using Per Protocol order mode (if indication Home Oxygen then change L/min to same amount at home and change Wean to Room Air to No). Notify provider if initiate oxygen therapy unless already on Home Oxygen.          Standing Status:   Standing     Number of Occurrences:   4    Pulse Oximetry Spot Check     Standing Status:   Standing     Number of Occurrences:   1    Incentive spirometry     Standing Status:   Standing     Number of Occurrences:   35    Initiate Oxygen Therapy Protocol     Initiate oxygen therapy if the patient has 1) SpO2 is less than 90%, 2) Cyanosis, Chest Pain, Dyspnea, or Altered level of consciousness AND SpO2 checked and it is less than 90%, or 3) patient on Home oxygen. To initiate oxygen therapy: nurse enters RT51 Nasal cannula oxygen order using Per Protocol order mode (if indication Home Oxygen then change L/min to same amount at home and change Wean to Room Air to No). Notify provider if initiate oxygen therapy unless already on Home Oxygen. Standing Status:   Standing     Number of Occurrences:   5    Phase I & II - metered glucose     Check glucose level if patient on medication for glucose control. Call anesthesiologist if glucose above 220. Standing Status:   Standing     Number of Occurrences:   99305     Orders Placed This Encounter   Medications    lactated ringers infusion    sodium chloride flush 0.9 % injection 10 mL    sodium chloride flush 0.9 % injection 10 mL    lidocaine PF 1 % injection 1 mL    gentamicin (GARAMYCIN) 120 mg in dextrose 5 % 100 mL IVPB    fentaNYL (SUBLIMAZE) injection 50 mcg    HYDROmorphone (DILAUDID) injection 0.5 mg    OR Linked Order Group     HYDROcodone-acetaminophen (NORCO) 5-325 MG per tablet 1 tablet     HYDROcodone-acetaminophen (NORCO) 5-325 MG per tablet 2 tablet    diphenhydrAMINE (BENADRYL) injection 12.5 mg    ondansetron (ZOFRAN) injection 4 mg    metoclopramide (REGLAN) injection 10 mg    meperidine (DEMEROL) injection 12.5 mg     No name on file. Please note this report has been partially produced using speech recognition software  And may cause contain errors related to that system including grammar, punctuation and spelling as well as words and phrases that may seem inappropriate. If there are questions or concerns please feel free to contact me to clarify.

## 2019-08-02 ENCOUNTER — TELEPHONE (OUTPATIENT)
Dept: UROLOGY | Age: 64
End: 2019-08-02

## 2019-08-02 DIAGNOSIS — N20.0 KIDNEY STONE: Primary | ICD-10-CM

## 2019-08-04 LAB
CALCULI COMPOSITION: NORMAL
MASS: 369 MG
STONE DESCRIPTION: NORMAL
STONE NUMBER: NORMAL
STONE SIZE: NORMAL MM

## 2019-08-08 ENCOUNTER — OFFICE VISIT (OUTPATIENT)
Dept: UROLOGY | Age: 64
End: 2019-08-08
Payer: MEDICARE

## 2019-08-08 ENCOUNTER — HOSPITAL ENCOUNTER (OUTPATIENT)
Dept: GENERAL RADIOLOGY | Age: 64
Discharge: HOME | End: 2019-08-10
Payer: MEDICARE

## 2019-08-08 VITALS
SYSTOLIC BLOOD PRESSURE: 150 MMHG | WEIGHT: 223 LBS | BODY MASS INDEX: 35 KG/M2 | DIASTOLIC BLOOD PRESSURE: 90 MMHG | HEART RATE: 73 BPM | HEIGHT: 67 IN

## 2019-08-08 DIAGNOSIS — N20.0 KIDNEY STONE: Primary | ICD-10-CM

## 2019-08-08 DIAGNOSIS — N20.0 KIDNEY STONE: ICD-10-CM

## 2019-08-08 PROCEDURE — 99024 POSTOP FOLLOW-UP VISIT: CPT | Performed by: UROLOGY

## 2019-08-08 PROCEDURE — 74018 RADEX ABDOMEN 1 VIEW: CPT

## 2019-08-08 PROCEDURE — 81003 URINALYSIS AUTO W/O SCOPE: CPT | Performed by: UROLOGY

## 2019-08-08 NOTE — PROGRESS NOTES
Chaperone for Intimate Exam    1. Was chaperone offered as part of the rooming process? offered, declined   2. If Chaperone is declined by patient, NA: chaperone was available and exam completed  3.  Chaperone is n/a
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   Social Needs    Financial resource strain: None    Food insecurity:     Worry: None     Inability: None    Transportation needs:     Medical: None     Non-medical: None   Tobacco Use    Smoking status: Never Smoker    Smokeless tobacco: Current User     Types: Chew   Substance and Sexual Activity    Alcohol use: No    Drug use: No    Sexual activity: Never   Lifestyle    Physical activity:     Days per week: None     Minutes per session: None    Stress: None   Relationships    Social connections:     Talks on phone: None     Gets together: None     Attends Roman Catholic service: None     Active member of club or organization: None     Attends meetings of clubs or organizations: None     Relationship status: None    Intimate partner violence:     Fear of current or ex partner: None     Emotionally abused: None     Physically abused: None     Forced sexual activity: None   Other Topics Concern    None   Social History Narrative    None     Family History   Problem Relation Age of Onset    Alzheimer's Disease Mother     Cancer Father         leukemia      Current Outpatient Medications   Medication Sig Dispense Refill    tamsulosin (FLOMAX) 0.4 MG capsule TAKE 1 CAPSULE BY MOUTH DAILY 30 capsule 1    doxazosin (CARDURA) 4 MG tablet Take 1 tablet by mouth nightly 30 tablet 0   

## 2019-08-09 ENCOUNTER — HOSPITAL ENCOUNTER (OUTPATIENT)
Age: 64
Setting detail: OUTPATIENT SURGERY
Discharge: HOME | End: 2019-08-09
Attending: UROLOGY | Admitting: UROLOGY
Payer: MEDICARE

## 2019-08-09 ENCOUNTER — TELEPHONE (OUTPATIENT)
Dept: UROLOGY | Age: 64
End: 2019-08-09

## 2019-08-09 VITALS
SYSTOLIC BLOOD PRESSURE: 126 MMHG | WEIGHT: 223 LBS | TEMPERATURE: 97.1 F | HEART RATE: 65 BPM | DIASTOLIC BLOOD PRESSURE: 72 MMHG | HEIGHT: 68 IN | OXYGEN SATURATION: 92 % | RESPIRATION RATE: 20 BRPM | BODY MASS INDEX: 33.8 KG/M2

## 2019-08-09 DIAGNOSIS — N20.0 KIDNEY STONE: Primary | ICD-10-CM

## 2019-08-09 PROCEDURE — 3600000004 HC SURGERY LEVEL 4 BASE: Performed by: UROLOGY

## 2019-08-09 PROCEDURE — 6360000002 HC RX W HCPCS: Performed by: UROLOGY

## 2019-08-09 PROCEDURE — 2580000003 HC RX 258: Performed by: UROLOGY

## 2019-08-09 PROCEDURE — 3600000014 HC SURGERY LEVEL 4 ADDTL 15MIN: Performed by: UROLOGY

## 2019-08-09 PROCEDURE — 2500000003 HC RX 250 WO HCPCS: Performed by: UROLOGY

## 2019-08-09 PROCEDURE — 52310 CYSTOSCOPY AND TREATMENT: CPT | Performed by: UROLOGY

## 2019-08-09 PROCEDURE — 2709999900 HC NON-CHARGEABLE SUPPLY: Performed by: UROLOGY

## 2019-08-09 PROCEDURE — 6370000000 HC RX 637 (ALT 250 FOR IP): Performed by: UROLOGY

## 2019-08-09 RX ORDER — CHLORHEXIDINE GLUCONATE 4 G/100ML
SOLUTION TOPICAL PRN
Status: DISCONTINUED | OUTPATIENT
Start: 2019-08-09 | End: 2019-08-09 | Stop reason: ALTCHOICE

## 2019-08-09 RX ORDER — SODIUM CHLORIDE, SODIUM LACTATE, POTASSIUM CHLORIDE, CALCIUM CHLORIDE 600; 310; 30; 20 MG/100ML; MG/100ML; MG/100ML; MG/100ML
INJECTION, SOLUTION INTRAVENOUS CONTINUOUS
Status: DISCONTINUED | OUTPATIENT
Start: 2019-08-09 | End: 2019-08-09 | Stop reason: HOSPADM

## 2019-08-09 RX ORDER — WATER 1000 ML/1000ML
INJECTION, SOLUTION INTRAVENOUS PRN
Status: DISCONTINUED | OUTPATIENT
Start: 2019-08-09 | End: 2019-08-09 | Stop reason: ALTCHOICE

## 2019-08-09 RX ORDER — GENTAMICIN SULFATE 80 MG/100ML
80 INJECTION, SOLUTION INTRAVENOUS
Status: COMPLETED | OUTPATIENT
Start: 2019-08-09 | End: 2019-08-09

## 2019-08-09 RX ADMIN — SODIUM CHLORIDE, POTASSIUM CHLORIDE, SODIUM LACTATE AND CALCIUM CHLORIDE 1000 ML: 600; 310; 30; 20 INJECTION, SOLUTION INTRAVENOUS at 09:20

## 2019-08-09 RX ADMIN — GENTAMICIN SULFATE 80 MG: 80 INJECTION, SOLUTION INTRAVENOUS at 09:38

## 2019-08-09 NOTE — BRIEF OP NOTE
Brief Postoperative Note  ______________________________________________________________    Patient: Felix Cormier  YOB: 1955  MRN: [id number]  Date of Procedure: 8/9/2019    Pre-Op Diagnosis: KIDNEY STONE    Post-Op Diagnosis: Same       Procedure(s): FLEXIBLE CYSTOSCOPY AND RIGHT STENT REMOVAL    Anesthesia: Anesthesia type not filed in the log.     Surgeon(s):  Vini Gomez MD    Assistant:       Estimated Blood Loss (mL): less than 50     Complications: None    Specimens:   * No specimens in log *    Implants:  * No implants in log *      Drains: * No LDAs found *    Findings: stent removed    Vini Gomez MD  Date: 8/9/2019  Time: 10:09 AM

## 2019-08-20 RX ORDER — TAMSULOSIN HYDROCHLORIDE 0.4 MG/1
0.4 CAPSULE ORAL DAILY
Qty: 30 CAPSULE | Refills: 1 | Status: SHIPPED | OUTPATIENT
Start: 2019-08-20 | End: 2019-09-06 | Stop reason: ALTCHOICE

## 2019-09-06 ENCOUNTER — OFFICE VISIT (OUTPATIENT)
Dept: UROLOGY | Age: 64
End: 2019-09-06

## 2019-09-06 ENCOUNTER — HOSPITAL ENCOUNTER (OUTPATIENT)
Dept: GENERAL RADIOLOGY | Age: 64
Discharge: HOME | End: 2019-09-08
Payer: MEDICARE

## 2019-09-06 VITALS
HEIGHT: 68 IN | DIASTOLIC BLOOD PRESSURE: 84 MMHG | HEART RATE: 66 BPM | BODY MASS INDEX: 33.95 KG/M2 | WEIGHT: 224 LBS | SYSTOLIC BLOOD PRESSURE: 136 MMHG

## 2019-09-06 DIAGNOSIS — N20.0 KIDNEY STONE: Primary | ICD-10-CM

## 2019-09-06 DIAGNOSIS — N20.0 KIDNEY STONE: ICD-10-CM

## 2019-09-06 PROCEDURE — 99024 POSTOP FOLLOW-UP VISIT: CPT | Performed by: UROLOGY

## 2019-09-06 PROCEDURE — 74018 RADEX ABDOMEN 1 VIEW: CPT

## 2023-09-23 ENCOUNTER — APPOINTMENT (OUTPATIENT)
Dept: GENERAL RADIOLOGY | Age: 68
End: 2023-09-23
Payer: MEDICARE

## 2023-09-23 ENCOUNTER — HOSPITAL ENCOUNTER (EMERGENCY)
Age: 68
Discharge: ANOTHER HOSPITAL | End: 2023-09-23
Attending: EMERGENCY MEDICINE
Payer: MEDICARE

## 2023-09-23 VITALS
TEMPERATURE: 98.1 F | SYSTOLIC BLOOD PRESSURE: 122 MMHG | DIASTOLIC BLOOD PRESSURE: 75 MMHG | HEART RATE: 71 BPM | RESPIRATION RATE: 16 BRPM | OXYGEN SATURATION: 97 % | BODY MASS INDEX: 33.27 KG/M2 | HEIGHT: 67 IN | WEIGHT: 212 LBS

## 2023-09-23 DIAGNOSIS — I21.4 NSTEMI (NON-ST ELEVATED MYOCARDIAL INFARCTION) (HCC): Primary | ICD-10-CM

## 2023-09-23 LAB
ACTIVATED PARTIAL THROMBOPLASTIN TIME IN PPP BY COAGULATION ASSAY: 34 SEC (ref 27–38)
ALBUMIN SERPL-MCNC: 4.6 G/DL (ref 3.5–4.6)
ALP SERPL-CCNC: 77 U/L (ref 35–104)
ALT SERPL-CCNC: 14 U/L (ref 0–41)
AMYLASE SERPL-CCNC: 50 U/L (ref 22–93)
ANION GAP IN SER/PLAS: 11 MMOL/L (ref 10–20)
ANION GAP SERPL CALCULATED.3IONS-SCNC: 11 MEQ/L (ref 9–15)
AST SERPL-CCNC: 20 U/L (ref 0–40)
BASOPHILS # BLD: 0 K/UL (ref 0–0.1)
BASOPHILS NFR BLD: 0.4 % (ref 0.2–1.2)
BILIRUB SERPL-MCNC: 1.2 MG/DL (ref 0.2–0.7)
BUN SERPL-MCNC: 32 MG/DL (ref 8–23)
CALCIUM (MG/DL) IN SER/PLAS: 9.5 MG/DL (ref 8.6–10.3)
CALCIUM SERPL-MCNC: 10.7 MG/DL (ref 8.5–9.9)
CARBON DIOXIDE, TOTAL (MMOL/L) IN SER/PLAS: 26 MMOL/L (ref 21–32)
CHLORIDE (MMOL/L) IN SER/PLAS: 105 MMOL/L (ref 98–107)
CHLORIDE SERPL-SCNC: 102 MEQ/L (ref 95–107)
CO2 SERPL-SCNC: 26 MEQ/L (ref 20–31)
CREAT SERPL-MCNC: 1.3 MG/DL (ref 0.7–1.2)
CREATININE (MG/DL) IN SER/PLAS: 1.27 MG/DL (ref 0.5–1.3)
EOSINOPHIL # BLD: 0.2 K/UL (ref 0–0.5)
EOSINOPHIL NFR BLD: 1.5 % (ref 0.8–7)
ERYTHROCYTE DISTRIBUTION WIDTH (RATIO) BY AUTOMATED COUNT: 13.5 % (ref 11.5–14.5)
ERYTHROCYTE MEAN CORPUSCULAR HEMOGLOBIN CONCENTRATION (G/DL) BY AUTOMATED: 33.7 G/DL (ref 32–36)
ERYTHROCYTE MEAN CORPUSCULAR VOLUME (FL) BY AUTOMATED COUNT: 94 FL (ref 80–100)
ERYTHROCYTE [DISTWIDTH] IN BLOOD BY AUTOMATED COUNT: 13.3 % (ref 11.6–14.4)
ERYTHROCYTES (10*6/UL) IN BLOOD BY AUTOMATED COUNT: 4.07 X10E12/L (ref 4.5–5.9)
GFR MALE: 61 ML/MIN/1.73M2
GLOBULIN SER CALC-MCNC: 3.4 G/DL (ref 2.3–3.5)
GLUCOSE (MG/DL) IN SER/PLAS: 105 MG/DL (ref 74–99)
GLUCOSE SERPL-MCNC: 134 MG/DL (ref 70–99)
HCT VFR BLD AUTO: 40.9 % (ref 42–52)
HEMATOCRIT (%) IN BLOOD BY AUTOMATED COUNT: 38.3 % (ref 41–52)
HEMOGLOBIN (G/DL) IN BLOOD: 12.9 G/DL (ref 13.5–17.5)
HEPARIN UNFRACTIONATED IN PPP BY CHROMOGENIC MET: 0.3 IU/ML
HGB BLD-MCNC: 13.5 G/DL (ref 13.7–17.5)
IMM GRANULOCYTES # BLD: 0 K/UL
IMM GRANULOCYTES NFR BLD: 0.4 %
INR IN PPP BY COAGULATION ASSAY: 1.2 (ref 0.9–1.1)
INR PPP: 1
LEUKOCYTES (10*3/UL) IN BLOOD BY AUTOMATED COUNT: 11.3 X10E9/L (ref 4.4–11.3)
LIPASE SERPL-CCNC: 34 U/L (ref 12–95)
LYMPHOCYTES # BLD: 1.4 K/UL (ref 1.3–3.6)
LYMPHOCYTES NFR BLD: 12.2 %
MAGNESIUM SERPL-MCNC: 2 MG/DL (ref 1.7–2.4)
MCH RBC QN AUTO: 30.7 PG (ref 25.7–32.2)
MCHC RBC AUTO-ENTMCNC: 33 % (ref 32.3–36.5)
MCV RBC AUTO: 93 FL (ref 79–92.2)
MONOCYTES # BLD: 0.7 K/UL (ref 0.3–0.8)
MONOCYTES NFR BLD: 5.9 % (ref 5.3–12.2)
NEUTROPHILS # BLD: 8.9 K/UL (ref 1.8–5.4)
NEUTS SEG NFR BLD: 79.6 % (ref 34–67.9)
PLATELET # BLD AUTO: 273 K/UL (ref 163–337)
PLATELETS (10*3/UL) IN BLOOD AUTOMATED COUNT: 266 X10E9/L (ref 150–450)
POTASSIUM (MMOL/L) IN SER/PLAS: 3.7 MMOL/L (ref 3.5–5.3)
POTASSIUM SERPL-SCNC: 4.3 MEQ/L (ref 3.4–4.9)
PROT SERPL-MCNC: 8 G/DL (ref 6.3–8)
PROTHROMBIN TIME (PT) IN PPP BY COAGULATION ASSAY: 13 SEC (ref 9.8–12.8)
PROTHROMBIN TIME: 13.4 SEC (ref 12.3–14.9)
RBC # BLD AUTO: 4.4 M/UL (ref 4.63–6.08)
SODIUM (MMOL/L) IN SER/PLAS: 138 MMOL/L (ref 136–145)
SODIUM SERPL-SCNC: 139 MEQ/L (ref 135–144)
TROPONIN I, HIGH SENSITIVITY: 8173 NG/L (ref 0–20)
TROPONIN I, HIGH SENSITIVITY: 8355 NG/L (ref 0–20)
TROPONIN I, HIGH SENSITIVITY: 9243 NG/L (ref 0–20)
TROPONIN T SERPL-MCNC: 0.03 NG/ML (ref 0–0.01)
UREA NITROGEN (MG/DL) IN SER/PLAS: 26 MG/DL (ref 6–23)
WBC # BLD AUTO: 11.1 K/UL (ref 4.2–9)

## 2023-09-23 PROCEDURE — 6360000002 HC RX W HCPCS: Performed by: EMERGENCY MEDICINE

## 2023-09-23 PROCEDURE — 80053 COMPREHEN METABOLIC PANEL: CPT

## 2023-09-23 PROCEDURE — 82150 ASSAY OF AMYLASE: CPT

## 2023-09-23 PROCEDURE — 85025 COMPLETE CBC W/AUTO DIFF WBC: CPT

## 2023-09-23 PROCEDURE — 83690 ASSAY OF LIPASE: CPT

## 2023-09-23 PROCEDURE — 2580000003 HC RX 258: Performed by: EMERGENCY MEDICINE

## 2023-09-23 PROCEDURE — 36415 COLL VENOUS BLD VENIPUNCTURE: CPT

## 2023-09-23 PROCEDURE — 2500000003 HC RX 250 WO HCPCS: Performed by: EMERGENCY MEDICINE

## 2023-09-23 PROCEDURE — A4216 STERILE WATER/SALINE, 10 ML: HCPCS | Performed by: EMERGENCY MEDICINE

## 2023-09-23 PROCEDURE — 84484 ASSAY OF TROPONIN QUANT: CPT

## 2023-09-23 PROCEDURE — 93005 ELECTROCARDIOGRAM TRACING: CPT

## 2023-09-23 PROCEDURE — 6370000000 HC RX 637 (ALT 250 FOR IP): Performed by: EMERGENCY MEDICINE

## 2023-09-23 PROCEDURE — 96374 THER/PROPH/DIAG INJ IV PUSH: CPT

## 2023-09-23 PROCEDURE — 83735 ASSAY OF MAGNESIUM: CPT

## 2023-09-23 PROCEDURE — 99285 EMERGENCY DEPT VISIT HI MDM: CPT

## 2023-09-23 PROCEDURE — 85610 PROTHROMBIN TIME: CPT

## 2023-09-23 PROCEDURE — 71045 X-RAY EXAM CHEST 1 VIEW: CPT

## 2023-09-23 PROCEDURE — 96375 TX/PRO/DX INJ NEW DRUG ADDON: CPT

## 2023-09-23 RX ORDER — ENOXAPARIN SODIUM 100 MG/ML
40 INJECTION SUBCUTANEOUS ONCE
Status: COMPLETED | OUTPATIENT
Start: 2023-09-23 | End: 2023-09-23

## 2023-09-23 RX ORDER — ONDANSETRON 2 MG/ML
4 INJECTION INTRAMUSCULAR; INTRAVENOUS ONCE
Status: COMPLETED | OUTPATIENT
Start: 2023-09-23 | End: 2023-09-23

## 2023-09-23 RX ORDER — MORPHINE SULFATE 2 MG/ML
2 INJECTION, SOLUTION INTRAMUSCULAR; INTRAVENOUS ONCE
Status: COMPLETED | OUTPATIENT
Start: 2023-09-23 | End: 2023-09-23

## 2023-09-23 RX ADMIN — ENOXAPARIN SODIUM 40 MG: 100 INJECTION SUBCUTANEOUS at 09:15

## 2023-09-23 RX ADMIN — MORPHINE SULFATE 2 MG: 2 INJECTION, SOLUTION INTRAMUSCULAR; INTRAVENOUS at 09:14

## 2023-09-23 RX ADMIN — NITROGLYCERIN 0.5 INCH: 20 OINTMENT TOPICAL at 07:39

## 2023-09-23 RX ADMIN — FAMOTIDINE 20 MG: 10 INJECTION, SOLUTION INTRAVENOUS at 07:39

## 2023-09-23 RX ADMIN — ONDANSETRON 4 MG: 2 INJECTION INTRAMUSCULAR; INTRAVENOUS at 09:14

## 2023-09-23 ASSESSMENT — PAIN SCALES - GENERAL
PAINLEVEL_OUTOF10: 1
PAINLEVEL_OUTOF10: 2

## 2023-09-23 ASSESSMENT — PAIN DESCRIPTION - PAIN TYPE: TYPE: ACUTE PAIN

## 2023-09-23 ASSESSMENT — ENCOUNTER SYMPTOMS
SORE THROAT: 0
CONSTIPATION: 0
CHEST TIGHTNESS: 0
EYE PAIN: 0
CHOKING: 0
BLOOD IN STOOL: 0
SINUS PRESSURE: 0
DIARRHEA: 0
SHORTNESS OF BREATH: 0
FACIAL SWELLING: 0
VOICE CHANGE: 0
BACK PAIN: 0
WHEEZING: 0
STRIDOR: 0
COUGH: 0
EYE DISCHARGE: 0
EYE REDNESS: 0
VOMITING: 0
TROUBLE SWALLOWING: 0
ABDOMINAL PAIN: 0

## 2023-09-23 ASSESSMENT — PAIN DESCRIPTION - DESCRIPTORS: DESCRIPTORS: ACHING

## 2023-09-23 ASSESSMENT — PAIN DESCRIPTION - LOCATION
LOCATION: CHEST
LOCATION: CHEST

## 2023-09-23 ASSESSMENT — PAIN - FUNCTIONAL ASSESSMENT: PAIN_FUNCTIONAL_ASSESSMENT: 0-10

## 2023-09-23 ASSESSMENT — PAIN DESCRIPTION - FREQUENCY: FREQUENCY: CONTINUOUS

## 2023-09-23 NOTE — ED TRIAGE NOTES
Patient presents to ED with c/o chest pain that started last night - reports at its worse it was a 9/10 and he did take 3 SL nitros over night.  Upon arrival he reports pain a 2/10

## 2023-09-23 NOTE — PROGRESS NOTES
I received a call from emergency room physician Dr. Kristian Briscoe to discuss this patient's situation and consider admitting him to [facility]. Patient is known to have CAD. He had CABG many years ago. Patient came in with chest pain. His troponin is positive. I have not seen or examined the patient. Based on the report provided by the emergency room physician I suspect that the patient is having non-STEMI, probable unstable plaque. Acute NSTEMI until proven otherwise. I highly recommend patient to to have coronary evaluation. Preferably coronary angiogram for the possible need of stenting. It is not appropriate to admit the patient to [facility] given the lack of cardiac cath capability.     I would recommend to transfer patient to [facility] EMERGENCY [facility] AT [place] if he is in agreement

## 2023-09-23 NOTE — ED NOTES
I spoke with Lucy Gonzalez RN at the [facility] to transfer to [facility].   Lucy Gonzalez states she will put the page out to [address] [place] [address], RN  09/23/23 [id number] [facility] RN  09/23/23 9741

## 2023-09-24 ENCOUNTER — HOSPITAL ENCOUNTER (INPATIENT)
Dept: DATA CONVERSION | Facility: HOSPITAL | Age: 68
LOS: 4 days | Discharge: HOME | End: 2023-09-28
Attending: INTERNAL MEDICINE | Admitting: INTERNAL MEDICINE
Payer: MEDICARE

## 2023-09-24 DIAGNOSIS — R74.8 ABNORMAL LEVELS OF OTHER SERUM ENZYMES: ICD-10-CM

## 2023-09-24 DIAGNOSIS — I21.4 NON-ST ELEVATION (NSTEMI) MYOCARDIAL INFARCTION (MULTI): ICD-10-CM

## 2023-09-24 LAB
ACTIVATED PARTIAL THROMBOPLASTIN TIME IN PPP BY COAGULATION ASSAY: 55 SEC (ref 27–38)
ATRIAL RATE: 59 BPM
ATRIAL RATE: 64 BPM
CHOLESTEROL (MG/DL) IN SER/PLAS: 124 MG/DL (ref 0–199)
CHOLESTEROL IN HDL (MG/DL) IN SER/PLAS: 25.9 MG/DL
CHOLESTEROL/HDL RATIO: 4.8
EKG ATRIAL RATE: 52 BPM
EKG ATRIAL RATE: 61 BPM
EKG P AXIS: 13 DEGREES
EKG P AXIS: 20 DEGREES
EKG P-R INTERVAL: 204 MS
EKG P-R INTERVAL: 212 MS
EKG Q-T INTERVAL: 378 MS
EKG Q-T INTERVAL: 380 MS
EKG QRS DURATION: 88 MS
EKG QRS DURATION: 92 MS
EKG QTC CALCULATION (BAZETT): 380 MS
EKG QTC CALCULATION (BAZETT): 404 MS
EKG R AXIS: -17 DEGREES
EKG R AXIS: -30 DEGREES
EKG T AXIS: 58 DEGREES
EKG T AXIS: 92 DEGREES
EKG VENTRICULAR RATE: 61 BPM
EKG VENTRICULAR RATE: 68 BPM
HEPARIN UNFRACTIONATED IN PPP BY CHROMOGENIC MET: 0.4 IU/ML
LDL: 72 MG/DL (ref 0–99)
P AXIS: 12 DEGREES
P AXIS: 17 DEGREES
P OFFSET: 171 MS
P OFFSET: 173 MS
P ONSET: 110 MS
P ONSET: 111 MS
PR INTERVAL: 208 MS
PR INTERVAL: 214 MS
Q ONSET: 214 MS
Q ONSET: 218 MS
QRS COUNT: 10 BEATS
QRS COUNT: 11 BEATS
QRS DURATION: 80 MS
QRS DURATION: 98 MS
QT INTERVAL: 378 MS
QT INTERVAL: 402 MS
QTC CALCULATION(BAZETT): 389 MS
QTC CALCULATION(BAZETT): 397 MS
QTC FREDERICIA: 386 MS
QTC FREDERICIA: 399 MS
R AXIS: -23 DEGREES
R AXIS: -31 DEGREES
T AXIS: 109 DEGREES
T AXIS: 116 DEGREES
T OFFSET: 403 MS
T OFFSET: 419 MS
TRIGLYCERIDE (MG/DL) IN SER/PLAS: 132 MG/DL (ref 0–149)
TROPONIN I, HIGH SENSITIVITY: ABNORMAL NG/L (ref 0–20)
VENTRICULAR RATE: 59 BPM
VENTRICULAR RATE: 64 BPM
VLDL: 26 MG/DL (ref 0–40)

## 2023-09-25 LAB
ANION GAP IN SER/PLAS: 13 MMOL/L (ref 10–20)
CALCIUM (MG/DL) IN SER/PLAS: 8.9 MG/DL (ref 8.6–10.3)
CARBON DIOXIDE, TOTAL (MMOL/L) IN SER/PLAS: 24 MMOL/L (ref 21–32)
CHLORIDE (MMOL/L) IN SER/PLAS: 104 MMOL/L (ref 98–107)
CREATININE (MG/DL) IN SER/PLAS: 1.44 MG/DL (ref 0.5–1.3)
ERYTHROCYTE DISTRIBUTION WIDTH (RATIO) BY AUTOMATED COUNT: 13.5 % (ref 11.5–14.5)
ERYTHROCYTE MEAN CORPUSCULAR HEMOGLOBIN CONCENTRATION (G/DL) BY AUTOMATED: 33.3 G/DL (ref 32–36)
ERYTHROCYTE MEAN CORPUSCULAR VOLUME (FL) BY AUTOMATED COUNT: 93 FL (ref 80–100)
ERYTHROCYTES (10*6/UL) IN BLOOD BY AUTOMATED COUNT: 4.09 X10E12/L (ref 4.5–5.9)
GFR MALE: 52 ML/MIN/1.73M2
GLUCOSE (MG/DL) IN SER/PLAS: 107 MG/DL (ref 74–99)
HEMATOCRIT (%) IN BLOOD BY AUTOMATED COUNT: 38.1 % (ref 41–52)
HEMOGLOBIN (G/DL) IN BLOOD: 12.7 G/DL (ref 13.5–17.5)
HEPARIN UNFRACTIONATED IN PPP BY CHROMOGENIC MET: 0.2 IU/ML
HEPARIN UNFRACTIONATED IN PPP BY CHROMOGENIC MET: 0.3 IU/ML
HEPARIN UNFRACTIONATED IN PPP BY CHROMOGENIC MET: <0.1 IU/ML
LEUKOCYTES (10*3/UL) IN BLOOD BY AUTOMATED COUNT: 13 X10E9/L (ref 4.4–11.3)
PLATELETS (10*3/UL) IN BLOOD AUTOMATED COUNT: 247 X10E9/L (ref 150–450)
POTASSIUM (MMOL/L) IN SER/PLAS: 3.7 MMOL/L (ref 3.5–5.3)
SODIUM (MMOL/L) IN SER/PLAS: 137 MMOL/L (ref 136–145)
UREA NITROGEN (MG/DL) IN SER/PLAS: 22 MG/DL (ref 6–23)

## 2023-09-26 LAB
ANION GAP IN SER/PLAS: 12 MMOL/L (ref 10–20)
CALCIUM (MG/DL) IN SER/PLAS: 8.9 MG/DL (ref 8.6–10.3)
CARBON DIOXIDE, TOTAL (MMOL/L) IN SER/PLAS: 23 MMOL/L (ref 21–32)
CHLORIDE (MMOL/L) IN SER/PLAS: 103 MMOL/L (ref 98–107)
CREATININE (MG/DL) IN SER/PLAS: 1.55 MG/DL (ref 0.5–1.3)
ERYTHROCYTE DISTRIBUTION WIDTH (RATIO) BY AUTOMATED COUNT: 13.6 % (ref 11.5–14.5)
ERYTHROCYTE MEAN CORPUSCULAR HEMOGLOBIN CONCENTRATION (G/DL) BY AUTOMATED: 34.3 G/DL (ref 32–36)
ERYTHROCYTE MEAN CORPUSCULAR VOLUME (FL) BY AUTOMATED COUNT: 92 FL (ref 80–100)
ERYTHROCYTES (10*6/UL) IN BLOOD BY AUTOMATED COUNT: 4.09 X10E12/L (ref 4.5–5.9)
GFR MALE: 48 ML/MIN/1.73M2
GLUCOSE (MG/DL) IN SER/PLAS: 100 MG/DL (ref 74–99)
HEMATOCRIT (%) IN BLOOD BY AUTOMATED COUNT: 37.6 % (ref 41–52)
HEMOGLOBIN (G/DL) IN BLOOD: 12.9 G/DL (ref 13.5–17.5)
HEPARIN UNFRACTIONATED IN PPP BY CHROMOGENIC MET: 0.3 IU/ML
HEPARIN UNFRACTIONATED IN PPP BY CHROMOGENIC MET: 0.3 IU/ML
LEUKOCYTES (10*3/UL) IN BLOOD BY AUTOMATED COUNT: 12.7 X10E9/L (ref 4.4–11.3)
PLATELETS (10*3/UL) IN BLOOD AUTOMATED COUNT: 258 X10E9/L (ref 150–450)
POTASSIUM (MMOL/L) IN SER/PLAS: 3.7 MMOL/L (ref 3.5–5.3)
SODIUM (MMOL/L) IN SER/PLAS: 134 MMOL/L (ref 136–145)
UREA NITROGEN (MG/DL) IN SER/PLAS: 25 MG/DL (ref 6–23)

## 2023-09-27 LAB
ANION GAP IN SER/PLAS: 13 MMOL/L (ref 10–20)
CALCIUM (MG/DL) IN SER/PLAS: 8.8 MG/DL (ref 8.6–10.3)
CARBON DIOXIDE, TOTAL (MMOL/L) IN SER/PLAS: 24 MMOL/L (ref 21–32)
CHLORIDE (MMOL/L) IN SER/PLAS: 103 MMOL/L (ref 98–107)
CREATININE (MG/DL) IN SER/PLAS: 1.6 MG/DL (ref 0.5–1.3)
GFR MALE: 46 ML/MIN/1.73M2
GLUCOSE (MG/DL) IN SER/PLAS: 97 MG/DL (ref 74–99)
HEPARIN UNFRACTIONATED IN PPP BY CHROMOGENIC MET: 0.3 IU/ML
POTASSIUM (MMOL/L) IN SER/PLAS: 3.8 MMOL/L (ref 3.5–5.3)
SODIUM (MMOL/L) IN SER/PLAS: 136 MMOL/L (ref 136–145)
UREA NITROGEN (MG/DL) IN SER/PLAS: 30 MG/DL (ref 6–23)

## 2023-09-28 VITALS — WEIGHT: 208.78 LBS | BODY MASS INDEX: 39.42 KG/M2 | HEIGHT: 61 IN

## 2023-09-30 NOTE — PROGRESS NOTES
Service: Cardiology     Subjective Data:   YARITZA OHARA is a 68 year old Male who is Hospital Day # 6.     Chief complaint: Chest pain        History of present illness:    YARITZA OHARA is a 68 year old Male with past medical history significant for coronary artery disease, coronary artery bypass 2008, hypertension, hyperlipidemia who presents with chest pain.  On Friday night at 11 PM after getting into bed patient  developed chest tightness.  He had no relief with taking Tums or not sublingual nitroglycerin.  He had nausea and vomiting.  He went to the emergency room and had eventual relief with IV morphine.  He has had recurrent chest tightness with physical exertion  while in the hospital.  Denies dyspnea, palpitations, dizziness, near syncope, evangelina syncope, edema.        Past surgical history:    Coronary artery bypass    Ankle surgery    Carpal tunnel release    Bilateral total knee replacement    Renal lithotripsy    Sinus surgery        Family history:    Mother history of coronary disease/PCI    Brother  leukemia        Social history:    Quit smoking age 38, smoked 10 cigars/day for 20 years    Denies alcohol use        Review of systems have been reviewed and are negative, noncontributory, as previous mentioned x12 systems.        I personally reviewed EKG and chest x-ray:     EKG: Sinus bradycardia first-degree AV block, nonspecific T wave abnormality     chest x-ray: No acute cardiopulmonary disease    Admission peak troponin 10,650    2023  Patient denies chest pain, dyspnea, palpitations, nausea, vomiting, dizziness, fever, chill, bleeding.  Daughter is at bedside.    2023  She has had some left leg discomfort he attributes to arthritis and laying in bed.  This morning he did have a brief episode of chest pain.  Denies dyspnea, palpitations, nausea, vomiting, dizziness, fever, chills.  I discussed plan for PCI September   27.    September 27, 2023  Sitting comfortably in bed.  He does have intermittent left hip discomfort that has been chronic over the past 2 months.  He has not seen orthopedic surgery yet.  He denies chest pain, dyspnea, palpitations, nausea, vomiting, dizziness    September 28, 2023  No new complaints.  Denies chest pain, dyspnea, palpitations, nausea, vomiting, dizziness.  Patient would like to go home.    Objective Data:     Objective Information:      T   P  R  BP   MAP  SpO2   Value  36.8  64  18  99/57   71  94%  Date/Time 9/28 4:01 9/28 4:01 9/27 20:53 9/28 4:01  9/28 4:01 9/28 4:01  Range  (36C - 36.8C )  (49 - 66 )  (18 - 18 )  (85 - 129 )/ (50 - 75 )  (65 - 96 )  (93% - 97% )      Pain reported at 9/27 15:35: 0 = None    Physical Exam by System:    Constitutional: Well developed, awake/alert/oriented  x3, no distress, alert and cooperative   Eyes: PERRL, EOMI, clear sclera   ENMT: mucous membranes moist, no apparent injury,  no lesions seen   Head/Neck: Neck supple, no apparent injury,No JVD,  trachea midline, no bruits   Respiratory/Thorax: Patent airways, CTAB, normal  breath sounds with good chest expansion, thorax symmetric   Cardiovascular: Regular, rate and rhythm, no murmurs,  2+ equal pulses of the extremities, normal S 1and S 2   Gastrointestinal: Nondistended, soft, non-tender,  no rebound tenderness or guarding, no masses palpable, no organomegaly, +BS,   Musculoskeletal: ROM intact, no joint swelling, normal  strength   Extremities: normal extremities, no cyanosis edema,  contusions or wounds, no clubbing   Neurological: alert and oriented x3   Lymphatic: No significant lymphadenopathy   Psychological: Appropriate mood and behavior   Skin: Warm and dry, no lesions, no rashes     Medication:    Medications:          Continuous Medications       --------------------------------  No continuous medications are active       Scheduled Medications       --------------------------------    1. Aspirin  Chewable:  81  mg  Oral  Daily    2. Atorvastatin:  80  mg  Oral  At Bedtime    3. Carvedilol:  12.5  mg  Oral  2 Times a Day    4. Docusate:  100  mg  Oral  2 Times a Day    5. Isosorbide Mononitrate Extended Release:  30  mg  Oral  Daily    6. Losartan:  25  mg  Oral  Daily    7. Ticagrelor Maintenance Dose:  90  mg  Oral  2 Times a Day         PRN Medications       --------------------------------    1. Acetaminophen:  650  mg  Oral  Every 4 Hours    2. Acetaminophen:  650  mg  Oral  Every 4 Hours    3. Melatonin:  3  mg  Oral  At Bedtime    4. Morphine Injectable:  2  mg  IntraVenous Push  Every 3 Minutes    5. Nitroglycerin SubLingual:  0.4  mg  SubLingual  Every 5 Minutes    6. Ondansetron Injectable:  4  mg  IntraVenous Push  Every 4 Hours    7. Sodium Chloride 0.9% Injectable Flush:  10  mL  IntraVenous Flush  Every 8 Hours and as Needed    8. traMADol:  50  mg  Oral  Every 8 Hours         Conditional Medication Orders       --------------------------------    1. Perflutren Lipid Microsphere (Activated) 1.3 mL / NaCL 0.9% T.V. 10 mL Injectable:  0.5  mL  IntraVenous Push  Once      Recent Lab Results:    Results:        BMP: 9/27/2023 04:21  NA+        Cl-     BUN  /                         136    103    30 H /  --------------------------------  Glucose                ---------------------------  97    K+     HCO3-   Creat \                         3.8  24    1.60 H \  Calcium : 8.8     Anion Gap : 13      Coagulation: 9/27/2023 04:21  PT  /                              /  -------<    INR          ----------<                PTT\                              \            Heparin Assay: 0.3           Radiology Results:    Results:        Impression:    NO ACUTE DISEASE IN THE CHEST     Xray Chest 1 View [Sep 26 2023  8:09AM]      Conclusion:  CONCLUSIONS:  1.Left Main Coronary Artery: This artery is severely calcified.  2. The entire Left Main: 70% stenosis.  3. Left Anterior Descending Artery: is  significantly obstructed.  4. Proximal LAD Lesion: The percent stenosis is 100%.  5. Circumflex Coronary Artery: significantly obstructed.  6. Proximal CX Lesion: The percent stenosis is 95%.  7. Distal Cx Lesion: The percent stenosis is 75%.  8. Right Coronary Artery: significantly obstructed.  9. Proximal RCA Lesion: The percent stenosis is 100%.  10. Evaluate for high risk PCI.  11. LIMA to the Mid LAD: Percent stenosis is 0%.  12. SVG to the 1st Marginal: Percent stenosis is 99%.  13. SVG to the 2nd Marginal: Percent stenosis is 80%.  14. The Left Ventricular Ejection Fraction is 30%.    ____________________________________________________________________________________  CPT Codes:  Left Heart Cath Bypass Graft w ventriculography and coronary angio([facility])-86904; Moderate Sedation Services initial 15 minutes patient >5 years-50090; Moderate Sedation Services 1st additional 15 minutes patient >5 years-41157    ICD 10 Codes:  I21.4-Non ST elevation (NSTEMI) myocardial infarction    Clint Oates MD  Performing Physician  Electronically signed by Clint Oates MD on 9/25/2023 at 3:06:51 PM      cc Report to: JOSE ALFREDO SIN    cc Report to: [id number] Oates MD          *** Final ***     Cardiac Catheterization Lab Procedures [Sep 25 2023  3:06PM]      Assessment and Plan:   Daily Risk Screen:  ·  Does patient have an indwelling urinary catheter? n/a consulting service   ·  Does patient have a central line? n/a consulting service     Comorbidities:  ·  Comorbidity Other     Code Status:  ·  Code Status Full Code     Assessment:    Assessment:  Non-ST elevation myocardial infarction  Coronary artery disease/CABG 2008s/p VALENTINA LCfx x 3 September 27, 2023  Ischemic cardiomyopathy ejection fraction 30-35%  3+ mitral valve regurgitation  Hypertension  Hyperlipidemia    Recommendations:    Stable to discharge on following cardiac medical therapy:  Aspirin Chewable:  81  mg  Oral  Daily    Atorvastatin:  80  mg  Oral   At Bedtime    Carvedilol:  12.5  mg  Oral  2 Times a Day    Isosorbide Mononitrate Extended Release:  30  mg  Oral  Daily    Losartan:  25  mg  Oral  Daily    Ticagrelor Maintenance Dose:  90  mg  Oral  2 Times a Day      Follow-up next week for PCI saphenous graft to OM1 and OM 2.          Electronic Signatures:  Ever Burk ()  (Signed 28-Sep-2023 06:46)   Authored: Service, Subjective Data, Objective Data, Assessment  and Plan, Note Completion      Last Updated: 28-Sep-2023 06:46 by Ever Burk ()

## 2023-09-30 NOTE — DISCHARGE SUMMARY
Send Summary:   Discharge Summary Providers:  Provider Role Provider Name   · Referring Required, No Pcp   · Attending Josefina Cazares   · Consulting Kushal Condon   · Consulting Deborah Oates   · Primary Required, No Pcp       Note Recipients: Required, No Pcp, MD       Discharge:    Summary:   Admission Date: .23-Sep-2023 17:41:00   Discharge Date: 28-Sep-2023   Attending Physician at Discharge: Josefina Cazares   Admission Reason: chest pain (1)   Final Discharge Diagnoses: Post PTCA, CAD in native  artery, Coronary artery disease involving coronary bypass graft, Hypertension, Ischemic congestive cardiomyopathy, NSTEMI, initial episode of care   Procedures: Date: 25-Sep-2023 13:05:00  Procedure Name: Cardiac catheterization with grafts    Date: 27-Sep-2023 12:45:00  Procedure Name: PCI   Condition at Discharge: Fair   Disposition at Discharge: .Home   Vital Signs:        T   P  R  BP   MAP  SpO2   Value  36  64  19  116/56   79  96%  Date/Time 9/28 7:47 9/28 7:47 9/28 7:47 9/28 7:47  9/28 7:47 9/28 7:47  Range  (36C - 36.8C )  (49 - 66 )  (18 - 19 )  (85 - 129 )/ (50 - 75 )  (65 - 96 )  (93% - 97% )    Date:            Weight/Scale Type:  Height:   28-Sep-2023 06:15  94.2  kg / bed       Physical Exam:    General: No acute distress, appropriate conversation, somewhat nervous about upcoming catheterization  HEENT:  Normocephalic/atraumatic, EOMI, PERRL, oral cavity clear of lesions, MMM, nares patent  Neck:  No JVD detectable, no LAD  Cardiac:  RRR, no m/r/g  Pulm:  CTAB, equal and bilateral chest rise  GI: soft, nontender, nondistended, +BS, no HSM  Extremities:  No edema noted, no chronic scars noted  Neuro:  AOx3, preserved strength in upper and lower extremities, preserved sensation    Hospital Course:    ontinue monitoring for now  #History of CAD status post CABG in 2008  #History of hypertension and hyperlipidemia    Troponin was elevated in the 8000 C 9000  Currently chest  pain-free  Continue aspirin statin  Was started on Brilinta by cardiology today  Beta-blocker as tolerated, patient was having bradycardia and beta-blocker was held  Statin  Echocardiogram in a.m.  Continue heparin drip  Nitrates for chest pain  Patient undergoing cardiac cath in a.m.    DVT prophylaxis on heparin drip    9/25/2023  Patient underwent cardiac cath today was found to have multivessel CAD with severe disease involving graft also native arteries, multiple EF 30 to 35%  Cardiology recommended CT surgery evaluation, CT surgery recommended no surgical intervention  Patient undergoing staged high risk PCI on Wednesday, 9/27/2023  Continue heparin drip until then  Continue monitoring on telemetry  Patient was reporting shortness of breath but x-ray showed no acute finding, patient saturating above 94% on room air  Continue monitoring for now    09/26/2023  -Patient with multivessel CAD with severe disease involving the graft as well as native arteries, LV EF 30 to 35%  -Cardiology will do initial stage of high risk PCI tomorrow.  Continuing heparin drip  -Discussed plan with patient and reminded him of being n.p.o. at midnight just in case and reassured patient and family.  -Rest of care as above    09/27/2023  -Patient having left heart cath done today.  -Via radial approach he underwent successful PCI with 4 drug-eluting stents placed to the left main and circumflex arteries reducing those 90 to 99% lesions down to 0% stenosis.    - Patient will be scheduled to return as an outpatient in 1 week for staged PCI of the SVG to OM1 and OM 2 under the care of Dr. Oates.  -Brilinta added to patient's home regimen  -If patient remains stable will plan to discharge home tomorrow    Patient tolerated left heart cath well.  Plan is for second staged PCI to take place next Wednesday.  Cardiology is scheduling a follow-up and have cleared the patient for discharge.  Will discharge patient home.      Discharge  "Information:    and Continuing Care:   Lab Results - Pending:    None  Radiology Results - Pending: None   Discharge Instructions:    Additional Orders:           Additional Instructions:   Please return to [facility] on Wednesday, 10/4/2023 for coronary angioplasty under the care of Dr. Oates.  You will be contacted on 10/3 with a time to arrive on 10/4.  Nothing to eat or drink after 7  AM the morning of your procedure.  You may take your medications with sips of water.  If you have questions or need to reschedule this procedure, please contact Kiarra (procedure ) at #[phone].    Discharge Medications: Home Medication   Low Dose ASA 81 mg oral tablet - 1 tab(s) orally once a day  Co Q-10 100 mg oral capsule - 1 cap(s) orally once a day  nitroglycerin 0.4 mg sublingual tablet - 1 tab(s) sublingual every 5 minutes  isosorbide mononitrate 30 mg oral tablet, extended release - 1 tab(s) orally once a day  atorvastatin 80 mg oral tablet - 1 tab(s) orally once (at bedtime)  ticagrelor 90 mg oral tablet - 1 tab(s) orally 2 times a day    losartan 25 mg oral tablet - 1 tab(s) orally once a day  carvedilol 12.5 mg oral tablet - 1 tab(s) orally 2 times a day.  Note: Increase in dose     PRN Medication     DNR Status:   ·  Code Status Code Status order at time of discharge: Full Code       Electronic Signatures:  Josefina Cazares)  (Signed 28-Sep-2023 14:19)   Authored: Send Summary, Summary Content, Ongoing Care,  DNR Status, Note Completion      Last Updated: 28-Sep-2023 14:19 by Josefina Cazares)    References:  1.  Data Referenced From \"Consult-Cardiac Surgery\" 25-Sep-2023 15:52   "

## 2023-09-30 NOTE — H&P
History & Physical Reviewed:   I have reviewed the History and Physical dated:  23-Sep-2023   History and Physical reviewed and relevant findings noted. Patient examined to review pertinent physical  findings.: No significant changes   Home Medications Reviewed: no changes noted   Allergies Reviewed: no changes noted       Airway/Sedation Assessment:  ·  Oropharyngeal Classification Class II   ·  ASA PS Classification ASA II   ·  Sedation Plan moderate sedation       ERAS (Enhanced Recovery After Surgery):  ·  ERAS Patient: no     Consent:   COVID-19 Consent:  ·  COVID-19 Risk Consent Surgeon has reviewed key risks related to the risk of merlyn COVID-19 and if they contract COVID-19 what the risks are.       Electronic Signatures:  Deborah Oates)  (Signed 27-Sep-2023 12:45)   Authored: History & Physical Reviewed, Airway/Sedation,  ERAS, Consent, Note Completion      Last Updated: 27-Sep-2023 12:45 by Deborah Oates)

## 2023-09-30 NOTE — H&P
History of Present Illness:   HPI:    YARITZA OHARA is a 68 year old Male with past medical history of CAD, hypertension, hyperlipidemia, who initially presented to the [facility] emergency department  with chest tightness.  The chest tightness began last night after he laid down for bed around 11 PM.  This happened earlier in the week and he took some Tums and it went away.  This time the tightness and pain was worse it was central and radiated across  the chest but not into the jaw or arm.  It did not relieved with Tums so he trialed some nitro which also did not help.  At that time he decided to come to the hospital and had his son drive him.  He says he vomited on the way to the hospital after which  point the pain eased.  He has not had recurrent pain since then.  Pain was not associated with any dyspnea.  He has not had any recent exertional chest pain or dyspnea.  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  He denies any fevers, abdominal pain, nausea vomiting, diarrhea.  He had a CABG about 15 years ago and has had no significant intervening cardiac issues.    PMHx: as above  PSHx: CABG  FHx: denies  SHx: chews tobacco, no etoh or drugs; normally active and independent    Comorbidities:   Comorbidites:  ·  Comorbid Conditions hypertension     Social History:   Social History:  Smoking Status never smoker (1)   Alcohol Use denies(1)   Drug Use denies (1)   Drug 2 Use denies (1)              Allergies:  ·  No Known Allergies :     Medications Prior to Admission:   Admission Medication Reconciliation has not been completed for this patient.    Objective:     Objective Information:      T   P  R  BP   MAP  SpO2   Value  36.3  57  20  121/74   92  96%  Date/Time 9/23 18:00 9/23 18:00 9/23 18:00 9/23 18:00  9/23 18:00 9/23 18:00  Range  (36.3C - 36.3C )  (57 - 57 )  (20 - 20 )  (121 - 121 )/ (74 - 74 )  (92 - 92 )  (96% - 96% )   As  of 23-Sep-2023 18:00:00, patient is on 2 L/min of oxygen via nasal cannula.    Physical Exam by System:    Constitutional: Well developed, awake/alert/oriented  x3, no distress, alert and cooperative   Eyes: PERRL, EOMI, clear sclera   Respiratory/Thorax: Clear to auscultation bilaterally;  normal respiratory effort   Cardiovascular: Regular rate and rhythm; no murmurs/rubs/gallops;  intact distal pulses   Gastrointestinal: Nondistended, soft, non-tender,  no rebound tenderness or guarding, no masses palpable, +BS   Extremities: normal extremities, no cyanosis edema,  contusions or wounds, no clubbing   Neurological: alert and oriented x3, intact senses,  motor response and reflexes, normal strength   Psychological: Appropriate mood and behavior   Skin: Warm and dry, no lesions, no rashes     Recent Lab Results:    Results:    CBC: 9/23/2023 18:31              \     Hgb     /                              \     12.9 L    /  WBC  ----------------  Plt               11.3       ----------------    266              /     Hct     \                              /     38.3 L    \            RBC: 4.07 L    MCV: 94           BMP: 9/23/2023 18:31  NA+        Cl-     BUN  /                         138    105    26 H /  --------------------------------  Glucose                ---------------------------  105 H    K+     HCO3-   Creat \                         3.7  26    1.27  \  Calcium : 9.5     Anion Gap : 11      Coagulation: 9/23/2023 18:31  PT  /                    13.0 H /  -------<    INR          ----------<      1.2 H  PTT\                    34  \                       Assessment and Plan:   Assessment:    NSTEMI  Sinus bradycardia  -trops at [facility] were 0.033 (not high sensitivity); repeat troponin here up to 8173  -started on heparin gtt  -cont asa/statin  -holding coreg due to relative bradycardia at this time  -serial trops and ekgs  -remains pain free at this time  -echo  -cardiology  "consult    HTN  HLD  -resume home meds once verified    DVT PPx: heparin      Electronic Signatures:  Ramos Rosales)  (Signed 23-Sep-2023 20:09)   Authored: History of Present Illness, Comorbidities,  Social History, Allergies, Medications Prior to Admission, Objective, Assessment and Plan, Note Completion      Last Updated: 23-Sep-2023 20:09 by Ramos Rosales (MD)    References:  1.  Data Referenced From \"Patient Profile - Adult v2\" 23-Sep-2023 18:15   "

## 2023-09-30 NOTE — PROGRESS NOTES
Service: Cardiology     Subjective Data:   YARITZA OHARA is a 68 year old Male who is Hospital Day # 5.     Chief complaint: Chest pain        History of present illness:    YARITZA OHARA is a 68 year old Male with past medical history significant for coronary artery disease, coronary artery bypass 2008, hypertension, hyperlipidemia who presents with chest pain.  On Friday night at 11 PM after getting into bed patient  developed chest tightness.  He had no relief with taking Tums or not sublingual nitroglycerin.  He had nausea and vomiting.  He went to the emergency room and had eventual relief with IV morphine.  He has had recurrent chest tightness with physical exertion  while in the hospital.  Denies dyspnea, palpitations, dizziness, near syncope, evangelina syncope, edema.        Past surgical history:    Coronary artery bypass    Ankle surgery    Carpal tunnel release    Bilateral total knee replacement    Renal lithotripsy    Sinus surgery        Family history:    Mother history of coronary disease/PCI    Brother  leukemia        Social history:    Quit smoking age 38, smoked 10 cigars/day for 20 years    Denies alcohol use        Review of systems have been reviewed and are negative, noncontributory, as previous mentioned x12 systems.        I personally reviewed EKG and chest x-ray:     EKG: Sinus bradycardia first-degree AV block, nonspecific T wave abnormality     chest x-ray: No acute cardiopulmonary disease    Admission peak troponin 10,650    2023  Patient denies chest pain, dyspnea, palpitations, nausea, vomiting, dizziness, fever, chill, bleeding.  Daughter is at bedside.    2023  She has had some left leg discomfort he attributes to arthritis and laying in bed.  This morning he did have a brief episode of chest pain.  Denies dyspnea, palpitations, nausea, vomiting, dizziness, fever, chills.  I discussed plan for PCI September   27.    September 27, 2023  Sitting comfortably in bed.  He does have intermittent left hip discomfort that has been chronic over the past 2 months.  He has not seen orthopedic surgery yet.  He denies chest pain, dyspnea, palpitations, nausea, vomiting, dizziness.    Objective Data:     Objective Information:      T   P  R  BP   MAP  SpO2   Value  36  65  18  125/75   95  95%  Date/Time 9/27 8:23 9/27 8:23 9/27 8:23 9/27 8:23  9/27 8:23 9/27 8:23  Range  (36C - 37.4C )  (59 - 65 )  (18 - 20 )  (85 - 129 )/ (50 - 75 )  (65 - 95 )  (92% - 97% )  Highest temp of 37.4 C was recorded at 9/26 19:58      Pain reported at 9/27 8:23: 0 = None    ---- Intake and Output  -----  Mn/Dy/Year Time  Intake   Output  Net  Sep 27, 2023 6:00 am  0   0  0      Physical Exam by System:    Constitutional: Well developed, awake/alert/oriented  x3, no distress, alert and cooperative   Eyes: PERRL, EOMI, clear sclera   ENMT: mucous membranes moist, no apparent injury,  no lesions seen   Head/Neck: Neck supple, no apparent injury,No JVD,  trachea midline, no bruits   Respiratory/Thorax: Patent airways, CTAB, normal  breath sounds with good chest expansion, thorax symmetric   Cardiovascular: Regular, rate and rhythm, no murmurs,  2+ equal pulses of the extremities, normal S 1and S 2   Gastrointestinal: Nondistended, soft, non-tender,  no rebound tenderness or guarding, no masses palpable, no organomegaly, +BS,   Musculoskeletal: ROM intact, no joint swelling, normal  strength   Extremities: normal extremities, no cyanosis edema,  contusions or wounds, no clubbing   Neurological: alert and oriented x3   Lymphatic: No significant lymphadenopathy   Psychological: Appropriate mood and behavior   Skin: Warm and dry, no lesions, no rashes     Medication:    Medications:          Continuous Medications       --------------------------------    1. Heparin 25,000 units/ D5W 250 mL Infusion:  1000  units/hr  IntraVenous  <Continuous>         Scheduled  Medications       --------------------------------    1. Aspirin Chewable:  81  mg  Oral  Daily    2. Atorvastatin:  80  mg  Oral  At Bedtime    3. Carvedilol:  12.5  mg  Oral  2 Times a Day    4. Docusate:  100  mg  Oral  2 Times a Day    5. Heparin (Initial LOAD) Injectable.:  4000  unit(s)  IntraVenous Push  Once    6. Isosorbide Mononitrate Extended Release:  30  mg  Oral  Daily    7. Losartan:  25  mg  Oral  Daily    8. Ticagrelor Maintenance Dose:  90  mg  Oral  2 Times a Day         PRN Medications       --------------------------------    1. Acetaminophen:  650  mg  Oral  Every 4 Hours    2. Acetaminophen:  650  mg  Oral  Every 4 Hours    3. Melatonin:  3  mg  Oral  At Bedtime    4. Morphine Injectable:  2  mg  IntraVenous Push  Every 3 Minutes    5. Nitroglycerin SubLingual:  0.4  mg  SubLingual  Every 5 Minutes    6. Ondansetron Injectable:  4  mg  IntraVenous Push  Every 4 Hours    7. Sodium Chloride 0.9% Injectable Flush:  10  mL  IntraVenous Flush  Every 8 Hours and as Needed    8. traMADol:  50  mg  Oral  Every 8 Hours         Conditional Medication Orders       --------------------------------    1. Perflutren Lipid Microsphere (Activated) 1.3 mL / NaCL 0.9% T.V. 10 mL Injectable:  0.5  mL  IntraVenous Push  Once      Recent Lab Results:    Results:        BMP: 9/27/2023 04:21  NA+        Cl-     BUN  /                         136    103    30 H /  --------------------------------  Glucose                ---------------------------  97    K+     HCO3-   Creat \                         3.8  24    1.60 H \  Calcium : 8.8     Anion Gap : 13      Coagulation: 9/27/2023 04:21  PT  /                              /  -------<    INR          ----------<                PTT\                              \            Heparin Assay: 0.3           Radiology Results:    Results:        Impression:    NO ACUTE DISEASE IN THE CHEST     Xray Chest 1 View [Sep 26 2023   8:09AM]      Conclusion:  CONCLUSIONS:  1.Left Main Coronary Artery: This artery is severely calcified.  2. The entire Left Main: 70% stenosis.  3. Left Anterior Descending Artery: is significantly obstructed.  4. Proximal LAD Lesion: The percent stenosis is 100%.  5. Circumflex Coronary Artery: significantly obstructed.  6. Proximal CX Lesion: The percent stenosis is 95%.  7. Distal Cx Lesion: The percent stenosis is 75%.  8. Right Coronary Artery: significantly obstructed.  9. Proximal RCA Lesion: The percent stenosis is 100%.  10. Evaluate for high risk PCI.  11. LIMA to the Mid LAD: Percent stenosis is 0%.  12. SVG to the 1st Marginal: Percent stenosis is 99%.  13. SVG to the 2nd Marginal: Percent stenosis is 80%.  14. The Left Ventricular Ejection Fraction is 30%.    ____________________________________________________________________________________  CPT Codes:  Left Heart Cath Bypass Graft w ventriculography and coronary angio([facility])-72931; Moderate Sedation Services initial 15 minutes patient >5 years-87094; Moderate Sedation Services 1st additional 15 minutes patient >5 years-[id number]    ICD 10 Codes:  I21.4-Non ST elevation (NSTEMI) myocardial infarction    [id number] Oates MD  Performing Physician  Electronically signed by Clint Oates MD on 9/25/2023 at 3:06:51 PM      cc Report to: JOSE ALFREDO SIN    cc Report to: [id number] Oates MD          *** Final ***     Cardiac Catheterization Lab Procedures [Sep 25 2023  3:06PM]      Assessment and Plan:   Daily Risk Screen:  ·  Does patient have an indwelling urinary catheter? n/a consulting service   ·  Does patient have a central line? n/a consulting service     Comorbidities:  ·  Comorbidity Other     Code Status:  ·  Code Status Full Code     Assessment:    Assessment:  Non-ST elevation myocardial infarction  Coronary artery disease/CABG 2008 September 25 cardiac cath:  LIMA to the Mid LAD: Percent stenosis is 0%.   SVG to the 1st Marginal: Percent  stenosis is 99%.   SVG to the 2nd Marginal: Percent stenosis is 80%.    Ejection Fraction is 30%.  Ischemic cardiomyopathy  Hypertension  Hyperlipidemia    Recommendations:  Plan for PCI September 27  Echocardiogram  Heparin drip  Aspirin  Statin  Beta-blocker  Oral nitrate  Brilinta  Add ARB      Electronic Signatures:  Ever Burk)  (Signed 27-Sep-2023 08:33)   Authored: Service, Subjective Data, Objective Data, Assessment  and Plan, Note Completion      Last Updated: 27-Sep-2023 08:33 by Ever Burk ()

## 2023-09-30 NOTE — PROGRESS NOTES
Service: Cardiology     Subjective Data:   YARITZA OHARA is a 68 year old Male who is Hospital Day # 3.     Chief complaint: Chest pain        History of present illness:    YARITZA OHARA is a 68 year old Male with past medical history significant for coronary artery disease, coronary artery bypass 2008, hypertension, hyperlipidemia who presents with chest pain.  On Friday night at 11 PM after getting into bed patient  developed chest tightness.  He had no relief with taking Tums or not sublingual nitroglycerin.  He had nausea and vomiting.  He went to the emergency room and had eventual relief with IV morphine.  He has had recurrent chest tightness with physical exertion  while in the hospital.  Denies dyspnea, palpitations, dizziness, near syncope, evangelina syncope, edema.        Past surgical history:    Coronary artery bypass    Ankle surgery    Carpal tunnel release    Bilateral total knee replacement    Renal lithotripsy    Sinus surgery        Family history:    Mother history of coronary disease/PCI    Brother  leukemia        Social history:    Quit smoking age 38, smoked 10 cigars/day for 20 years    Denies alcohol use        Review of systems have been reviewed and are negative, noncontributory, as previous mentioned x12 systems.        I personally reviewed EKG and chest x-ray:     EKG: Sinus bradycardia first-degree AV block, nonspecific T wave abnormality     chest x-ray: No acute cardiopulmonary disease    Admission peak troponin 10,650    2023  Patient denies chest pain, dyspnea, palpitations, nausea, vomiting, dizziness, fever, chill, bleeding.  Daughter is at bedside.    Objective Data:     Objective Information:      T   P  R  BP   MAP  SpO2   Value  36  61  17  125/69   92  96%  Date/Time  8:00  8:00  8:00  8:00   8:00  8:00  Range  (36C - 37.3C )  (58 - 66 )  (16 - 17 )  (117 - 146 )/ (63 - 79 )  (83 - 103 )  (91% -  96% )  Highest temp of 37.3 C was recorded at 9/24 20:08      Pain reported at 9/24 20:29: 0 = None    ---- Intake and Output  -----  Mn/Dy/Year Time  Intake   Output  Net  Sep 24, 2023 10:00 pm  0   560  -560  Sep 24, 2023 2:00 pm  0   200  -200    The Intake and Output Totals for the last 24 hours are:      Intake   Output  Net      null   760  null    Physical Exam by System:    Constitutional: Well developed, awake/alert/oriented  x3, no distress, alert and cooperative   Eyes: PERRL, EOMI, clear sclera   ENMT: mucous membranes moist, no apparent injury,  no lesions seen   Head/Neck: Neck supple, no apparent injury,No JVD,  trachea midline, no bruits   Respiratory/Thorax: Patent airways, CTAB, normal  breath sounds with good chest expansion, thorax symmetric   Cardiovascular: Regular, rate and rhythm, no murmurs,  2+ equal pulses of the extremities, normal S 1and S 2   Gastrointestinal: Nondistended, soft, non-tender,  no rebound tenderness or guarding, no masses palpable, no organomegaly, +BS,   Musculoskeletal: ROM intact, no joint swelling, normal  strength   Extremities: normal extremities, no cyanosis edema,  contusions or wounds, no clubbing   Neurological: alert and oriented x3   Lymphatic: No significant lymphadenopathy   Psychological: Appropriate mood and behavior   Skin: Warm and dry, no lesions, no rashes     Medication:    Medications:          Continuous Medications       --------------------------------    1. Heparin 25,000 units/ D5W 250 mL Infusion:  1000  units/hr  IntraVenous  <Continuous>         Scheduled Medications       --------------------------------    1. Aspirin Chewable:  81  mg  Oral  Daily    2. Atorvastatin:  80  mg  Oral  At Bedtime    3. Docusate:  100  mg  Oral  2 Times a Day    4. Heparin (Initial LOAD) Injectable.:  4000  unit(s)  IntraVenous Push  Once    5. Isosorbide Mononitrate Extended Release:  30  mg  Oral  Daily    6. Metoprolol Tartrate:  12.5  mg  Oral  2 Times a Day     7. Ticagrelor Maintenance Dose:  90  mg  Oral  2 Times a Day         PRN Medications       --------------------------------    1. Acetaminophen:  650  mg  Oral  Every 4 Hours    2. Acetaminophen:  650  mg  Oral  Every 4 Hours    3. Melatonin:  3  mg  Oral  At Bedtime    4. Morphine Injectable:  2  mg  IntraVenous Push  Every 3 Minutes    5. Nitroglycerin SubLingual:  0.4  mg  SubLingual  Every 5 Minutes    6. Ondansetron Injectable:  4  mg  IntraVenous Push  Every 4 Hours    7. Sodium Chloride 0.9% Injectable Flush:  10  mL  IntraVenous Flush  Every 8 Hours and as Needed    8. traMADol:  50  mg  Oral  Every 8 Hours         Conditional Medication Orders       --------------------------------    1. Perflutren Lipid Microsphere (Activated) 1.3 mL / NaCL 0.9% T.V. 10 mL Injectable:  0.5  mL  IntraVenous Push  Once         Currently Suspended Medications       --------------------------------    1. Carvedilol:  12.5  mg  Oral  2 Times a Day      Recent Lab Results:    Results:    CBC: 9/25/2023 05:43              \     Hgb     /                              \     12.7 L    /  WBC  ----------------  Plt               13.0 H    ----------------    247              /     Hct     \                              /     38.1 L    \            RBC: 4.09 L    MCV: 93           BMP: 9/25/2023 05:43  NA+        Cl-     BUN  /                         137    104    22  /  --------------------------------  Glucose                ---------------------------  107 H    K+     HCO3-   Creat \                         3.7  24    1.44 H \  Calcium : 8.9     Anion Gap : 13      Coagulation: 9/25/2023 05:43  PT  /                              /  -------<    INR          ----------<                PTT\                              \            Heparin Assay: 0.2           Radiology Results:    Results:    No Results have been selected.  Please select Results from the Available Results list before marking as  Reviewed.      Conclusion:  Sinus rhythm with occasional Premature ventricular complexes  Nonspecific ST and T wave abnormality  Abnormal ECG  When compared with ECG of 05-JUL-2013 10:29,  Premature ventricular complexes are now Present  Criteria for Inferior infarct are no longer Present  ST no longer elevated in Inferior leads  Confirmed by Ever Burk ([id number]) on 9/24/2023 8:44:37 AM     Electrocardiogram 12 Lead [Sep 24 2023  8:44AM]      Assessment and Plan:   Comorbidities:  ·  Comorbidity Other     Code Status:  ·  Code Status Full Code     Assessment:    Assessment:  Non-ST elevation myocardial infarction  Coronary artery disease/CABG 2008  Hypertension  Hyperlipidemia    Recommendations:  Cardiac catheterization-risk and benefit discussed with patient willing to proceed  Echocardiogram  Heparin drip  Aspirin  Statin  Beta-blocker  Oral nitrate  Brilinta      Electronic Signatures:  Ever Burk ()  (Signed 25-Sep-2023 10:15)   Authored: Service, Subjective Data, Objective Data, Assessment  and Plan, Note Completion      Last Updated: 25-Sep-2023 10:15 by Ever Burk ()

## 2023-09-30 NOTE — PROGRESS NOTES
Service: Medicine     Subjective Data:   YARITZA OHARA is a 68 year old Male who is Hospital Day # 3.    Additional Information:    Patient was seen and examined at bedside, patient underwent cardiac cath today which showed subtotal occlusion of graft to OM1, severe disease and graft to OM 2 with  severe disease in native circumflex EF 30 to 35%.  CT surgery was consulted for possible CABG recommended against CABG since risk outweighs benefits  Patient to undergo high risk PCI on 9/27/2023  Patient was complaining of shortness of breath, saturation above 94% on room air and x-ray did not show any acute finding.    Objective Data:     Objective Information:      T   P  R  BP   MAP  SpO2   Value  36.7  60  18  126/80   99  97%  Date/Time 9/25 14:32 9/25 15:35 9/25 15:08 9/25 15:35  9/25 15:35 9/25 15:35  Range  (36C - 37.3C )  (54 - 66 )  (16 - 20 )  (115 - 146 )/ (57 - 80 )  (83 - 103 )  (91% - 97% )  Highest temp of 37.3 C was recorded at 9/24 20:08      Pain reported at 9/25 8:00: 0 = None    ---- Intake and Output  -----  Mn/Dy/Year Time  Intake   Output  Net  Sep 25, 2023 2:00 pm  205   350  -145  Sep 24, 2023 10:00 pm  0   560  -560    The Intake and Output Totals for the last 24 hours are:      Intake   Output  Net      null   760  null    Physical Exam Narrative:  ·  Physical Exam:    Constitutional: Well developed, awake/alert/oriented x3, no distress, alert and cooperative  Eyes: PERRL, EOMI, clear sclera  ENMT: mucous membranes moist, no apparent injury, no lesions seen  Head/Neck: Neck supple,  Respiratory/Thorax: normal breath sounds with good chest expansion, thorax symmetric  Cardiovascular: Regular, rate and rhythm, no murmurs, 2+ equal pulses of the extremities, normal S 1and S 2  Gastrointestinal: Nondistended, soft, non-tender, no rebound tenderness or guarding, no masses palpable, no organomegaly, +BS, no bruits  Musculoskeletal: ROM intact, no joint swelling, normal  strength  Extremities: normal extremities, no cyanosis edema, contusions or wounds, no clubbing  Neurological: alert and oriented x3, intact senses, motor, response and reflexes, normal strength  Psychological: Appropriate mood and behavior  Skin: Warm and dry, no lesions, no rashes      Recent Lab Results:    Results:    CBC: 9/25/2023 05:43              \     Hgb     /                              \     12.7 L    /  WBC  ----------------  Plt               13.0 H    ----------------    247              /     Hct     \                              /     38.1 L    \            RBC: 4.09 L    MCV: 93           BMP: 9/25/2023 05:43  NA+        Cl-     BUN  /                         137    104    22  /  --------------------------------  Glucose                ---------------------------  107 H    K+     HCO3-   Creat \                         3.7  24    1.44 H \  Calcium : 8.9     Anion Gap : 13      Coagulation: 9/25/2023 10:32  PT  /                              /  -------<    INR          ----------<                PTT\                              \            Heparin Assay: 0.3           Assessment and Plan:   Comorbidities:  ·  Comorbidity obesity   ·  Obesity obesity (BMI 35-39.9)   ·  BMI 39.72     Code Status:  ·  Code Status Full Code     Assessment:    #NSTEMI  #History of CAD status post CABG in 2008  #History of hypertension and hyperlipidemia    Troponin was elevated in the 8000 C 9000  Currently chest pain-free  Continue aspirin statin  Was started on Brilinta by cardiology today  Beta-blocker as tolerated, patient was having bradycardia and beta-blocker was held  Statin  Echocardiogram in a.m.  Continue heparin drip  Nitrates for chest pain  Patient undergoing cardiac cath in a.m.    DVT prophylaxis on heparin drip    9/25/2023  Patient underwent cardiac cath today was found to have multivessel CAD with severe disease involving graft also native arteries, multiple EF 30 to 35%  Cardiology  recommended CT surgery evaluation, CT surgery recommended no surgical intervention  Patient undergoing staged high risk PCI on Wednesday, 9/27/2023  Continue heparin drip until then  Continue monitoring on telemetry  Patient was reporting shortness of breath but x-ray showed no acute finding, patient saturating above 94% on room air  Continue monitoring for now        Electronic Signatures:  Rosalie Brar)  (Signed 25-Sep-2023 18:47)   Authored: Service, Subjective Data, Objective Data, Assessment  and Plan, Note Completion      Last Updated: 25-Sep-2023 18:47 by Rosalie Brar)

## 2023-09-30 NOTE — PROGRESS NOTES
July 8, 2021      Hadley Salinas  [address]  [facility] [address]        Dear Hadley,       The results of your colonoscopy performed on June 30, 2021 have returned and indicate the following:      Colon Results:  Diverticulosis  4 Tubular Adenoma Polyp(s)    INFORMATION:  · Tubular Adenoma Polyp(s)  Tubular adenomas are growths of tissue in the colon that can be pre-cancerous.  Please note, if these polyps are not removed, over time they can lead to colon cancer.  Although your polyp(s) were removed during the procedure, these types of polyps can reoccur and other polyps may develop.    It is important for you to be re-screened in the future for any polyps that may re-occur. Colonoscopies remain the best examination for follow-up with patients who have had polyps removed.    Diverticulosis  Diverticulosis, or diverticular disease, occurs when small pockets or pouches form in the walls of the colon. It is believed that the small pouches (diverticula) form when pressure inside the colon builds and makes the wall bulge in spots where the colon may be weak.      One of the most common causes of this increased pressure is related to constipation. When waste material is hard, the muscles that contract to move the waste through your system have to squeeze harder, with more force, thus increasing the pressure.      Below are some lifestyle changes that may help you manage and/or prevent diverticulosis:  • Eat more fiber and drink plenty of fluids. This will help to soften the stools and promote regular elimination.  • Don't ignore the urge to have a bowel movement. Delaying the urge can mean straining later which increases the pressure within the colon.    • Exercise regularly, which aids in digestion.     Usually the small diverticula pouches don't cause any problems. Occasionally symptoms may be present including: Constipation, mild abdominal pain, cramping, diarrhea, or bloating. In rare cases          Service: Cardiology     Subjective Data:   YARITZA OHARA is a 68 year old Male who is Hospital Day # 4.     Chief complaint: Chest pain        History of present illness:    YARITZA OHARA is a 68 year old Male with past medical history significant for coronary artery disease, coronary artery bypass 2008, hypertension, hyperlipidemia who presents with chest pain.  On Friday night at 11 PM after getting into bed patient  developed chest tightness.  He had no relief with taking Tums or not sublingual nitroglycerin.  He had nausea and vomiting.  He went to the emergency room and had eventual relief with IV morphine.  He has had recurrent chest tightness with physical exertion  while in the hospital.  Denies dyspnea, palpitations, dizziness, near syncope, evangelina syncope, edema.        Past surgical history:    Coronary artery bypass    Ankle surgery    Carpal tunnel release    Bilateral total knee replacement    Renal lithotripsy    Sinus surgery        Family history:    Mother history of coronary disease/PCI    Brother  leukemia        Social history:    Quit smoking age 38, smoked 10 cigars/day for 20 years    Denies alcohol use        Review of systems have been reviewed and are negative, noncontributory, as previous mentioned x12 systems.        I personally reviewed EKG and chest x-ray:     EKG: Sinus bradycardia first-degree AV block, nonspecific T wave abnormality     chest x-ray: No acute cardiopulmonary disease    Admission peak troponin 10,650    2023  Patient denies chest pain, dyspnea, palpitations, nausea, vomiting, dizziness, fever, chill, bleeding.  Daughter is at bedside.    2023  She has had some left leg discomfort he attributes to arthritis and laying in bed.  This morning he did have a brief episode of chest pain.  Denies dyspnea, palpitations, nausea, vomiting, dizziness, fever, chills.  I discussed plan for PCI September   27.    Objective Data:     Objective Information:      T   P  R  BP   MAP  SpO2   Value  36.9  72  20  159/88   117  95%  Date/Time 9/25 23:43 9/25 23:43 9/25 21:01 9/25 23:43  9/25 23:43 9/25 23:43  Range  (36C - 37.3C )  (54 - 72 )  (16 - 20 )  (115 - 159 )/ (57 - 88 )  (88 - 117 )  (93% - 97% )  Highest temp of 37.3 C was recorded at 9/25 21:01      Pain reported at 9/25 22:58: 0 = None    ---- Intake and Output  -----  Mn/Dy/Year Time  Intake   Output  Net  Sep 25, 2023 2:00 pm  205   350  -145    The Intake and Output Totals for the last 24 hours are:      Intake   Output  Net      205   350  -145    Physical Exam by System:    Constitutional: Well developed, awake/alert/oriented  x3, no distress, alert and cooperative   Eyes: PERRL, EOMI, clear sclera   ENMT: mucous membranes moist, no apparent injury,  no lesions seen   Head/Neck: Neck supple, no apparent injury,No JVD,  trachea midline, no bruits   Respiratory/Thorax: Patent airways, CTAB, normal  breath sounds with good chest expansion, thorax symmetric   Cardiovascular: Regular, rate and rhythm, no murmurs,  2+ equal pulses of the extremities, normal S 1and S 2   Gastrointestinal: Nondistended, soft, non-tender,  no rebound tenderness or guarding, no masses palpable, no organomegaly, +BS,   Musculoskeletal: ROM intact, no joint swelling, normal  strength   Extremities: normal extremities, no cyanosis edema,  contusions or wounds, no clubbing   Neurological: alert and oriented x3   Lymphatic: No significant lymphadenopathy   Psychological: Appropriate mood and behavior   Skin: Warm and dry, no lesions, no rashes     Medication:    Medications:          Continuous Medications       --------------------------------    1. Heparin 25,000 units/ D5W 250 mL Infusion:  1000  units/hr  IntraVenous  <Continuous>         Scheduled Medications       --------------------------------    1. Aspirin Chewable:  81  mg  Oral  Daily    2. Atorvastatin:  80  mg  Oral  At  the diverticula may become infected or bleed which you will notice changes in your stool, experience fever, chills, or abdominal pain. If bleeding occurs, see your doctor to assess and manage the bleeding.       Due to your personal history of colon polyps, I am recommending a follow-up colonoscopy in 3 years.     It has been a pleasure caring for you.  If you have any questions, please feel free to call me at the number listed below or contact me through [email or web address].    Sincerely,        Hadley Garcia MD  Gastroenterology  [address]  [address]  [phone]                           Bedtime    3. Docusate:  100  mg  Oral  2 Times a Day    4. Heparin (Initial LOAD) Injectable.:  4000  unit(s)  IntraVenous Push  Once    5. Isosorbide Mononitrate Extended Release:  30  mg  Oral  Daily    6. Metoprolol Tartrate:  12.5  mg  Oral  2 Times a Day    7. Ticagrelor Maintenance Dose:  90  mg  Oral  2 Times a Day         PRN Medications       --------------------------------    1. Acetaminophen:  650  mg  Oral  Every 4 Hours    2. Acetaminophen:  650  mg  Oral  Every 4 Hours    3. Melatonin:  3  mg  Oral  At Bedtime    4. Morphine Injectable:  2  mg  IntraVenous Push  Every 3 Minutes    5. Nitroglycerin SubLingual:  0.4  mg  SubLingual  Every 5 Minutes    6. Ondansetron Injectable:  4  mg  IntraVenous Push  Every 4 Hours    7. Sodium Chloride 0.9% Injectable Flush:  10  mL  IntraVenous Flush  Every 8 Hours and as Needed    8. traMADol:  50  mg  Oral  Every 8 Hours         Conditional Medication Orders       --------------------------------    1. Perflutren Lipid Microsphere (Activated) 1.3 mL / NaCL 0.9% T.V. 10 mL Injectable:  0.5  mL  IntraVenous Push  Once         Currently Suspended Medications       --------------------------------    1. Carvedilol:  12.5  mg  Oral  2 Times a Day      Recent Lab Results:    Results:    CBC: 9/26/2023 03:10              \     Hgb     /                              \     12.9 L    /  WBC  ----------------  Plt               12.7 H    ----------------    258              /     Hct     \                              /     37.6 L    \            RBC: 4.09 L    MCV: 92           BMP: 9/26/2023 03:10  NA+        Cl-     BUN  /                         134 L   103    25 H  /  --------------------------------  Glucose                ---------------------------  100 H    K+     HCO3-   Creat \                         3.7  23    1.55 H \  Calcium : 8.9     Anion Gap : 12      Coagulation: 9/26/2023 07:30  PT  /                              /  -------<    INR           ----------<                PTT\                              \            Heparin Assay: 0.3           Radiology Results:    Results:        Impression:    NO ACUTE DISEASE IN THE CHEST     Xray Chest 1 View [Sep 26 2023  8:09AM]      Conclusion:  CONCLUSIONS:  1.Left Main Coronary Artery: This artery is severely calcified.  2. The entire Left Main: 70% stenosis.  3. Left Anterior Descending Artery: is significantly obstructed.  4. Proximal LAD Lesion: The percent stenosis is 100%.  5. Circumflex Coronary Artery: significantly obstructed.  6. Proximal CX Lesion: The percent stenosis is 95%.  7. Distal Cx Lesion: The percent stenosis is 75%.  8. Right Coronary Artery: significantly obstructed.  9. Proximal RCA Lesion: The percent stenosis is 100%.  10. Evaluate for high risk PCI.  11. LIMA to the Mid LAD: Percent stenosis is 0%.  12. SVG to the 1st Marginal: Percent stenosis is 99%.  13. SVG to the 2nd Marginal: Percent stenosis is 80%.  14. The Left Ventricular Ejection Fraction is 30%.    ____________________________________________________________________________________  CPT Codes:  Left Heart Cath Bypass Graft w ventriculography and coronary angio(C)-88971; Moderate Sedation Services initial 15 minutes patient >5 years-[id number]; Moderate Sedation Services 1st additional 15 minutes patient >5 years-[id number]    ICD 10 Codes:  I21.4-Non ST elevation (NSTEMI) myocardial infarction    [id number] Oates MD  Performing Physician  Electronically signed by Clint Oates MD on 9/25/2023 at 3:06:51 PM      cc Report to: JOSE ALFREDO SIN    cc Report to: Clint Oates MD          *** Final ***     Cardiac Catheterization Lab Procedures [Sep 25 2023  3:06PM]      Assessment and Plan:   Comorbidities:  ·  Comorbidity Other     Code Status:  ·  Code Status Full Code     Assessment:    Assessment:  Non-ST elevation myocardial infarction  Coronary artery disease/CABG 2008 September 25 cardiac cath:  LIMA to the Mid LAD:  Percent stenosis is 0%.   SVG to the 1st Marginal: Percent stenosis is 99%.   SVG to the 2nd Marginal: Percent stenosis is 80%.    Ejection Fraction is 30%.  Ischemic cardiomyopathy  Hypertension  Hyperlipidemia    Recommendations:  Plan for PCI September 27  Echocardiogram  Heparin drip  Aspirin  Statin  Beta-blocker  Oral nitrate  Brilinta  Add ARB      Electronic Signatures:  Ever Burk)  (Signed 26-Sep-2023 09:50)   Authored: Service, Subjective Data, Objective Data, Assessment  and Plan, Note Completion      Last Updated: 26-Sep-2023 09:50 by Ever Burk ()

## 2023-09-30 NOTE — PROGRESS NOTES
Service: Medicine     Subjective Data:   YARITZA OHARA is a 68 year old Male who is Hospital Day # 6.    Objective Data:     Objective Information:      T   P  R  BP   MAP  SpO2   Value  36  64  19  116/56   79  96%  Date/Time 9/28 7:47 9/28 7:47 9/28 7:47 9/28 7:47  9/28 7:47 9/28 7:47  Range  (36C - 36.8C )  (49 - 66 )  (18 - 19 )  (85 - 129 )/ (50 - 75 )  (65 - 96 )  (93% - 97% )      Pain reported at 9/28 8:27: 0 = None    ---- Intake and Output  -----  Mn/Dy/Year Time  Intake   Output  Net  Sep 28, 2023 2:00 pm  480   1  149      Physical Exam Narrative:  ·  Physical Exam:    General: No acute distress, appropriate conversation, somewhat nervous about upcoming catheterization  HEENT:  Normocephalic/atraumatic, EOMI, PERRL, oral cavity clear of lesions, MMM, nares patent  Neck:  No JVD detectable, no LAD  Cardiac:  RRR, no m/r/g  Pulm:  CTAB, equal and bilateral chest rise  GI: soft, nontender, nondistended, +BS, no HSM  Extremities:  No edema noted, no chronic scars noted  Neuro:  AOx3, preserved strength in upper and lower extremities, preserved sensation, CN2-12 intact      Medication:    Medications:          Continuous Medications       --------------------------------  No continuous medications are active       Scheduled Medications       --------------------------------    1. Aspirin Chewable:  81  mg  Oral  Daily    2. Atorvastatin:  80  mg  Oral  At Bedtime    3. Carvedilol:  12.5  mg  Oral  2 Times a Day    4. Docusate:  100  mg  Oral  2 Times a Day    5. Isosorbide Mononitrate Extended Release:  30  mg  Oral  Daily    6. Losartan:  25  mg  Oral  Daily    7. Ticagrelor Maintenance Dose:  90  mg  Oral  2 Times a Day         PRN Medications       --------------------------------    1. Acetaminophen:  650  mg  Oral  Every 4 Hours    2. Acetaminophen:  650  mg  Oral  Every 4 Hours    3. Melatonin:  3  mg  Oral  At Bedtime    4. Morphine Injectable:  2  mg  IntraVenous Push  Every 3 Minutes    5.  Nitroglycerin SubLingual:  0.4  mg  SubLingual  Every 5 Minutes    6. Ondansetron Injectable:  4  mg  IntraVenous Push  Every 4 Hours    7. Sodium Chloride 0.9% Injectable Flush:  10  mL  IntraVenous Flush  Every 8 Hours and as Needed    8. traMADol:  50  mg  Oral  Every 8 Hours         Conditional Medication Orders       --------------------------------    1. Perflutren Lipid Microsphere (Activated) 1.3 mL / NaCL 0.9% T.V. 10 mL Injectable:  0.5  mL  IntraVenous Push  Once      Recent Lab Results:    Results:        I have reviewed these laboratory results:    Basic Metabolic Panel  27-Sep-2023 04:21:00      Result Value    Glucose, Serum  97    NA  136    K  3.8    CL  103    Bicarbonate, Serum  24    Anion Gap, Serum  13    BUN  30   H   CREAT  1.60   H   GFR Male  46   A   Calcium, Serum  8.8        Radiology Results:    Results:        Conclusion:  CONCLUSIONS:  1. Circumflex Coronary Artery: significantly obstructed.  2. Proximal CX Lesion: The percent stenosis is 99%.  3. Proximal CX Lesion: pre-dilation, Resolute [place], Resolute [place] 2.75x34, post-dilation Resolute [place] 3.00x8: 0% residual stenosis. proximal CX: pre-procedure DANE flow was 3(complete perfusion) and post-procedure DANE flow was 3(complete perfusion).  4. Distal Cx Lesion: The percent stenosis is 90%.  5. Distal CX Lesion: pre-dilation, Resolute [place] 2.75x18 Resolute [place] 2.75x8 : 0% residual stenosis. distal CX:pre-procedure DANE flow was 3(complete perfusion) and post-procedure DANE flow was 3(complete perfusion).  6. To return for PCI of the vein graft to first and second obtuse marginal branches.    ____________________________________________________________________________________  CPT Codes:  Angioplasty, single Left Circumflex major Artery/branch (PCI)-20709.LC; Stent w angioplasty Left Circumflex single major Artery branch (PCI)-25593.LC    ICD 10 Codes:  I21.4-Non ST elevation (NSTEMI) myocardial infarction    [id number] Deborah Oates  MD  Performing Physician  Electronically signedby [id number] Deborah Oates MD on 9/27/2023 at 1:00:33 PM      cc Report to: CRISTOPHER SOSA    cc Report to: [id number] Deborah Oates MD          *** Final ***     Cardiac Catheterization Lab Procedures [Sep 27 2023  1:00PM]      Conclusion:  CONCLUSIONS:  1. Left ventricular systolic function is moderately to severely decreased with a 30-35% estimated ejection fraction.  2. Multiple segmental abnormalities exist. See findings.  3. Spectral Doppler shows an impaired relaxation pattern of left ventricular diastolic filling.  4. There is no evidence of mitral valve stenosis.  5. Moderate to severe mitral valve regurgitation.  6. Trace to mild tricuspid regurgitation.  7. Aortic valve stenosis is not present.  8. The main pulmonary artery is normal in size, and position, with normal bifurcation into the left and right pulmonary arteries.    RECOMMENDATIONS:  Technically suboptimal and limited study, therefore accuracy of above interpretation could be substantially diminished. Clinical correlation is advised. Consider additional imaging modalities if clinically indicated.    QUANTITATIVE DATA SUMMARY:  2D MEASUREMENTS:  Normal Ranges:  Ao Root d:     3.20 cm    (2.0-3.7cm)  IVSd:          1.14 cm    (0.6-1.1cm)  LVPWd:         0.98 cm    (0.6-1.1cm)  LVIDd:         5.41 cm    (3.9-5.9cm)  LVIDs:         3.27 cm  LV Mass Index: 117.7 g/m2  LV % FS        39.6 %    LA VOLUME:  Normal Ranges:  LA Vol A4C:        80.2 ml    (22+/-6mL/m2)  LA Vol A2C:        114.3 ml  LA Vol BP:         95.8 ml  LA Vol Index A4C:  42.0ml/m2  LA Vol Index A2C:  59.8 ml/m2  LA Vol Index BP:   50.1 ml/m2  LA Area A4C:       23.8 cm2  LA Area A2C:       28.4 cm2  LA Major Axis A4C: 6.0 cm  LA Major Axis A2C: 6.0 cm  LA Volume Index:   48.7 ml/m2  LA Vol A4C:        76.0 ml  LA Vol A2C:        113.0 ml    RA VOLUME BY A/L METHOD:  Normal Ranges:  RA Vol A4C:        32.8 ml (8.3-19.5ml)  RA Vol Index A4C:  17.2  ml/m2  RA Area A4C:       13.6 cm2  RA Major Axis A4C: 4.8 cm    LV SYSTOLIC FUNCTION BY 2D PLANIMETRY (MOD):  Normal Ranges:  EF-A4C View: 32.7 % (>=55%)  EF-A2C View: 36.1 %  EF-Biplane:  32.9 %    LV DIASTOLIC FUNCTION:  Normal Ranges:  MV Peak E:    0.63 m/s (0.7-1.2 m/s)  MV Peak A:    0.65 m/s (0.42-0.7 m/s)  E/A Ratio:    0.97     (1.0-2.2)  MV e'         0.10 m/s (>8.0)  MV lateral e' 0.12 m/s  MV medial e'  0.08 m/s  E/e' Ratio:   6.27     (<8.0)    MITRAL VALVE:  Normal Ranges:  MV DT: 378 msec (150-240msec)    MITRAL INSUFFICIENCY:  Normal Ranges:  PISA Radius:  0.9 cm  MR VTI:       195.00 cm  MR Vmax:      509.00 cm/s  MR Alias Chilo: 39.9 cm/s  MR Volume:    77.80 ml  MR Flow Rt:   203.07 ml/s  MR EROA:      0.40 cm2    AORTIC VALVE:  Normal Ranges:  AoV Vmax:                1.41 m/s (<=1.7m/s)  AoV Peak P.0 mmHg (<20mmHg)  AoV Mean P.0 mmHg (1.7-11.5mmHg)  LVOT Max Chilo:            0.88 m/s (<=1.1m/s)  AoV VTI:                 27.80 cm (18-25cm)  LVOT VTI:                17.40 cm  LVOT Diameter:           2.00 cm  (1.8-2.4cm)  AoV Area, VTI:           1.97 cm2 (2.5-5.5cm2)  AoV Area,Vmax:           1.96 cm2 (2.5-4.5cm2)  AoV Dimensionless Index: 0.63      RIGHT VENTRICLE:  RV Basal 4.10 cm  RV Mid   3.70 cm  RV Major 7.8 cm  TAPSE:   18.8 mm  RV s'    0.11 m/s    TRICUSPID VALVE/RVSP:  Normal Ranges:  Peak TR Velocity: 2.48 m/s  RV Syst Pressure: 27.6 mmHg (< 30mmHg)  IVC Diam:         1.50 cm    PULMONIC VALVE:  Normal Ranges:  PV Accel Time: 127 msec (>120ms)  PV Max Chilo:    1.0 m/s  (0.6-0.9m/s)  PV Max P.0 mmHg  PV Mean P.0 mmHg  PV VTI:        17.80 cm      [id number] Ever Davila DO  Electronically signed on 2023 at 11:29:45 AM      Wall Scoring        *** Final ***     Echocardiogram [Sep 26 2023 11:29AM]      Assessment and Plan:   Comorbidities:  ·  Comorbidity obesity   ·  Obesity obesity (BMI 35-39.9)   ·  BMI 39.72     Code Status:  ·  Code  Status Full Code     Assessment:    Continue monitoring for now  #History of CAD status post CABG in 2008  #History of hypertension and hyperlipidemia    Troponin was elevated in the 8000 C 9000  Currently chest pain-free  Continue aspirin statin  Was started on Brilinta by cardiology today  Beta-blocker as tolerated, patient was having bradycardia and beta-blocker was held  Statin  Echocardiogram in a.m.  Continue heparin drip  Nitrates for chest pain  Patient undergoing cardiac cath in a.m.    DVT prophylaxis on heparin drip    9/25/2023  Patient underwent cardiac cath today was found to have multivessel CAD with severe disease involving graft also native arteries, multiple EF 30 to 35%  Cardiology recommended CT surgery evaluation, CT surgery recommended no surgical intervention  Patient undergoing staged high risk PCI on Wednesday, 9/27/2023  Continue heparin drip until then  Continue monitoring on telemetry  Patient was reporting shortness of breath but x-ray showed no acute finding, patient saturating above 94% on room air  Continue monitoring for now    09/26/2023  -Patient with multivessel CAD with severe disease involving the graft as well as native arteries, LV EF 30 to 35%  -Cardiology will do initial stage of high risk PCI tomorrow.  Continuing heparin drip  -Discussed plan with patient and reminded him of being n.p.o. at midnight just in case and reassured patient and family.  -Rest of care as above        Electronic Signatures:  Josefina Cazares)  (Signed 28-Sep-2023 14:11)   Authored: Service, Subjective Data, Objective Data, Assessment  and Plan, Note Completion      Last Updated: 28-Sep-2023 14:11 by Josefina Cazares)

## 2023-09-30 NOTE — PROGRESS NOTES
Service: Medicine     Subjective Data:   YARITZA OHARA is a 68 year old Male who is Hospital Day # 5.    Objective Data:     Objective Information:      T   P  R  BP   MAP  SpO2   Value  36  64  19  116/56   79  96%  Date/Time 9/28 7:47 9/28 7:47 9/28 7:47 9/28 7:47  9/28 7:47 9/28 7:47  Range  (36C - 36.8C )  (49 - 66 )  (18 - 19 )  (85 - 129 )/ (50 - 75 )  (65 - 96 )  (93% - 97% )      Pain reported at 9/28 8:27: 0 = None    ---- Intake and Output  -----  Mn/Dy/Year Time  Intake   Output  Net  Sep 28, 2023 2:00 pm  480   1  599      Physical Exam Narrative:  ·  Physical Exam:    General: No acute distress, appropriate conversation, somewhat nervous about upcoming catheterization  HEENT:  Normocephalic/atraumatic, EOMI, PERRL, oral cavity clear of lesions, MMM, nares patent  Neck:  No JVD detectable, no LAD  Cardiac:  RRR, no m/r/g  Pulm:  CTAB, equal and bilateral chest rise  GI: soft, nontender, nondistended, +BS, no HSM  Extremities:  No edema noted, no chronic scars noted  Neuro:  AOx3, preserved strength in upper and lower extremities, preserved sensation, CN2-12 intact      Medication:    Medications:          Continuous Medications       --------------------------------  No continuous medications are active       Scheduled Medications       --------------------------------    1. Aspirin Chewable:  81  mg  Oral  Daily    2. Atorvastatin:  80  mg  Oral  At Bedtime    3. Carvedilol:  12.5  mg  Oral  2 Times a Day    4. Docusate:  100  mg  Oral  2 Times a Day    5. Isosorbide Mononitrate Extended Release:  30  mg  Oral  Daily    6. Losartan:  25  mg  Oral  Daily    7. Ticagrelor Maintenance Dose:  90  mg  Oral  2 Times a Day         PRN Medications       --------------------------------    1. Acetaminophen:  650  mg  Oral  Every 4 Hours    2. Acetaminophen:  650  mg  Oral  Every 4 Hours    3. Melatonin:  3  mg  Oral  At Bedtime    4. Morphine Injectable:  2  mg  IntraVenous Push  Every 3 Minutes    5.  Nitroglycerin SubLingual:  0.4  mg  SubLingual  Every 5 Minutes    6. Ondansetron Injectable:  4  mg  IntraVenous Push  Every 4 Hours    7. Sodium Chloride 0.9% Injectable Flush:  10  mL  IntraVenous Flush  Every 8 Hours and as Needed    8. traMADol:  50  mg  Oral  Every 8 Hours         Conditional Medication Orders       --------------------------------    1. Perflutren Lipid Microsphere (Activated) 1.3 mL / NaCL 0.9% T.V. 10 mL Injectable:  0.5  mL  IntraVenous Push  Once      Assessment and Plan:   Comorbidities:  ·  Comorbidity obesity   ·  Obesity obesity (BMI 35-39.9)   ·  BMI 39.72     Code Status:  ·  Code Status Full Code     Assessment:    Continue monitoring for now  #History of CAD status post CABG in 2008  #History of hypertension and hyperlipidemia    Troponin was elevated in the 8000 C 9000  Currently chest pain-free  Continue aspirin statin  Was started on Brilinta by cardiology today  Beta-blocker as tolerated, patient was having bradycardia and beta-blocker was held  Statin  Echocardiogram in a.m.  Continue heparin drip  Nitrates for chest pain  Patient undergoing cardiac cath in a.m.    DVT prophylaxis on heparin drip    9/25/2023  Patient underwent cardiac cath today was found to have multivessel CAD with severe disease involving graft also native arteries, multiple EF 30 to 35%  Cardiology recommended CT surgery evaluation, CT surgery recommended no surgical intervention  Patient undergoing staged high risk PCI on Wednesday, 9/27/2023  Continue heparin drip until then  Continue monitoring on telemetry  Patient was reporting shortness of breath but x-ray showed no acute finding, patient saturating above 94% on room air  Continue monitoring for now    09/26/2023  -Patient with multivessel CAD with severe disease involving the graft as well as native arteries, LV EF 30 to 35%  -Cardiology will do initial stage of high risk PCI tomorrow.  Continuing heparin drip  -Discussed plan with patient and  reminded him of being n.p.o. at midnight just in case and reassured patient and family.  -Rest of care as above    09/27/2023  -Patient having left heart cath done today.  -Via radial approach he underwent successful PCI with 4 drug-eluting stents placed to the left main and circumflex arteries reducing those 90 to 99% lesions down to 0% stenosis.    - Patient will be scheduled to return as an outpatient in 1 week for staged PCI of the SVG to OM1 and OM 2 under the care of Dr. Oates.  -Brilinta added to patient's home regimen  -If patient remains stable will plan to discharge home tomorrow        Electronic Signatures:  Josefina Cazares)  (Signed 28-Sep-2023 14:14)   Authored: Service, Subjective Data, Objective Data, Assessment  and Plan, Note Completion      Last Updated: 28-Sep-2023 14:14 by Josefina Cazares)

## 2023-09-30 NOTE — H&P
History & Physical Reviewed:   I have reviewed the History and Physical dated:  23-Sep-2023   History and Physical reviewed and relevant findings noted. Patient examined to review pertinent physical  findings.: No significant changes   Home Medications Reviewed: no changes noted   Allergies Reviewed: no changes noted       Airway/Sedation Assessment:  ·  Oropharyngeal Classification Class II   ·  ASA PS Classification ASA II   ·  Sedation Plan moderate sedation       ERAS (Enhanced Recovery After Surgery):  ·  ERAS Patient: no     Consent:   COVID-19 Consent:  ·  COVID-19 Risk Consent Surgeon has reviewed key risks related to the risk of merlyn COVID-19 and if they contract COVID-19 what the risks are.       Electronic Signatures:  Deborah Oates)  (Signed 25-Sep-2023 10:11)   Authored: History & Physical Reviewed, Airway/Sedation,  ERAS, Consent, Note Completion      Last Updated: 25-Sep-2023 10:11 by Deborah Oates)

## 2023-09-30 NOTE — PROGRESS NOTES
Service: Medicine     Subjective Data:   YARITZA OHARA is a 68 year old Male who is Hospital Day # 2.    Additional Information:    Patient was seen and examined at bedside, denies chest pain or shortness of breath sitting comfortably in bed, reported shortness of breath earlier and was placed  on nasal cannula oxygen but saturating above 94%.  Denies any cough.    Objective Data:     Objective Information:      T   P  R  BP   MAP  SpO2   Value  37.1  66  17  126/71   94  95%  Date/Time 9/24 16:00 9/24 16:00 9/24 16:00 9/24 16:00  9/24 16:00 9/24 16:00  Range  (36.3C - 37.1C )  (57 - 66 )  (16 - 20 )  (117 - 146 )/ (67 - 77 )  (92 - 103 )  (90% - 96% )   As of 23-Sep-2023 18:00:00, patient is on 2 L/min of oxygen via room air.  Highest temp of 37.1 C was recorded at 9/24 16:00      Pain reported at 9/24 8:11: 0 = None    Physical Exam by System:    Constitutional: Well developed, awake/alert/oriented  x3, no distress, alert and cooperative   Eyes: PERRL, EOMI, clear sclera   ENMT: mucous membranes moist, no apparent injury,  no lesions seen   Head/Neck: Neck supple,   Respiratory/Thorax: normal breath sounds with good  chest expansion, thorax symmetric   Cardiovascular: Regular, rate and rhythm, no murmurs,  2+ equal pulses of the extremities, normal S 1and S 2   Gastrointestinal: Nondistended, soft, non-tender,  no rebound tenderness or guarding, no masses palpable, no organomegaly, +BS, no bruits   Musculoskeletal: ROM intact, no joint swelling, normal  strength   Extremities: normal extremities, no cyanosis edema,  contusions or wounds, no clubbing   Neurological: alert and oriented x3, intact senses,  motor, response and reflexes, normal strength   Psychological: Appropriate mood and behavior   Skin: Warm and dry, no lesions, no rashes     Recent Lab Results:    Results:    CBC: 9/23/2023 18:31              \     Hgb     /                              \     12.9 L    /  WBC  ----------------  Plt                11.3       ----------------    266              /     Hct     \                              /     38.3 L    \            RBC: 4.07 L    MCV: 94           BMP: 9/23/2023 18:31  NA+        Cl-     BUN  /                         138    105    26 H /  --------------------------------  Glucose                ---------------------------  105 H    K+     HCO3-   Creat \                         3.7  26    1.27  \  Calcium : 9.5     Anion Gap : 11      Coagulation: 9/23/2023 18:31  PT  /                    13.0 H /  -------<    INR          ----------<      1.2 H  PTT\                    55 H \            Heparin Assay: 0.4           Assessment and Plan:   Code Status:  ·  Code Status Full Code     Assessment:    #NSTEMI  #History of CAD status post CABG in 2008  #History of hypertension and hyperlipidemia    Troponin was elevated in the 8000 C 9000  Currently chest pain-free  Continue aspirin statin  Was started on Brilinta by cardiology today  Beta-blocker as tolerated, patient was having bradycardia and beta-blocker was held  Statin  Echocardiogram in a.m.  Continue heparin drip  Nitrates for chest pain  Patient undergoing cardiac cath in a.m.    DVT prophylaxis on heparin drip      Electronic Signatures:  Rosalie Brar)  (Signed 24-Sep-2023 16:23)   Authored: Service, Subjective Data, Objective Data, Assessment  and Plan, Note Completion      Last Updated: 24-Sep-2023 16:23 by Rosalie Brar)

## 2023-10-02 LAB
ATRIAL RATE: 59 BPM
P AXIS: 9 DEGREES
P OFFSET: 176 MS
P ONSET: 116 MS
PR INTERVAL: 182 MS
Q ONSET: 207 MS
QRS COUNT: 10 BEATS
QRS DURATION: 104 MS
QT INTERVAL: 420 MS
QTC CALCULATION(BAZETT): 415 MS
QTC FREDERICIA: 417 MS
R AXIS: -37 DEGREES
T AXIS: 86 DEGREES
T OFFSET: 417 MS
VENTRICULAR RATE: 59 BPM

## 2023-10-04 ENCOUNTER — TELEPHONE (OUTPATIENT)
Dept: CARDIOLOGY | Facility: CLINIC | Age: 68
End: 2023-10-04
Payer: MEDICARE

## 2023-10-04 DIAGNOSIS — I20.89 OTHER FORMS OF ANGINA PECTORIS (CMS-HCC): ICD-10-CM

## 2023-10-04 DIAGNOSIS — I25.118 CORONARY ARTERY DISEASE INVOLVING NATIVE HEART WITH OTHER FORM OF ANGINA PECTORIS, UNSPECIFIED VESSEL OR LESION TYPE (CMS-HCC): ICD-10-CM

## 2023-10-04 PROBLEM — I25.10 CORONARY ARTERY DISEASE INVOLVING NATIVE HEART: Status: ACTIVE | Noted: 2023-10-04

## 2023-10-04 RX ORDER — ASPIRIN 81 MG/1
81 TABLET ORAL DAILY
COMMUNITY

## 2023-10-04 RX ORDER — ATORVASTATIN CALCIUM 80 MG/1
80 TABLET, FILM COATED ORAL DAILY
COMMUNITY
End: 2023-10-12 | Stop reason: SDUPTHER

## 2023-10-04 RX ORDER — CARVEDILOL 12.5 MG/1
TABLET ORAL
COMMUNITY
End: 2023-10-12 | Stop reason: SDUPTHER

## 2023-10-04 RX ORDER — NITROGLYCERIN 0.4 MG/1
0.4 TABLET SUBLINGUAL EVERY 5 MIN PRN
COMMUNITY

## 2023-10-04 RX ORDER — ISOSORBIDE MONONITRATE 30 MG/1
30 TABLET, EXTENDED RELEASE ORAL DAILY
COMMUNITY
End: 2023-10-12 | Stop reason: SDUPTHER

## 2023-10-04 RX ORDER — LOSARTAN POTASSIUM 25 MG/1
25 TABLET ORAL DAILY
COMMUNITY
End: 2023-10-12 | Stop reason: SDUPTHER

## 2023-10-04 RX ORDER — ASPIRIN 325 MG
100 TABLET, DELAYED RELEASE (ENTERIC COATED) ORAL DAILY
COMMUNITY
End: 2023-10-12 | Stop reason: ALTCHOICE

## 2023-10-04 NOTE — TELEPHONE ENCOUNTER
Per Dr. Oates place order for PCI of the SVG to OM1 and OM2. IV NS 75/hr for 2 hours prior to procedure. BMP on admitResult Communication    No results found from the In Basket message.    9:41 AM

## 2023-10-05 ENCOUNTER — APPOINTMENT (OUTPATIENT)
Dept: CARDIOLOGY | Facility: HOSPITAL | Age: 68
End: 2023-10-05
Payer: MEDICARE

## 2023-10-05 ENCOUNTER — HOSPITAL ENCOUNTER (OUTPATIENT)
Facility: HOSPITAL | Age: 68
Setting detail: OUTPATIENT SURGERY
Discharge: HOME | End: 2023-10-05
Attending: INTERNAL MEDICINE | Admitting: INTERNAL MEDICINE
Payer: MEDICARE

## 2023-10-05 VITALS
BODY MASS INDEX: 31.01 KG/M2 | HEIGHT: 68 IN | OXYGEN SATURATION: 98 % | SYSTOLIC BLOOD PRESSURE: 118 MMHG | TEMPERATURE: 97.2 F | WEIGHT: 204.59 LBS | DIASTOLIC BLOOD PRESSURE: 74 MMHG | HEART RATE: 56 BPM | RESPIRATION RATE: 16 BRPM

## 2023-10-05 DIAGNOSIS — I20.89 OTHER FORMS OF ANGINA PECTORIS (CMS-HCC): ICD-10-CM

## 2023-10-05 DIAGNOSIS — I20.9 ANGINA PECTORIS, UNSPECIFIED (CMS-HCC): ICD-10-CM

## 2023-10-05 DIAGNOSIS — I25.118 CORONARY ARTERY DISEASE INVOLVING NATIVE HEART WITH OTHER FORM OF ANGINA PECTORIS, UNSPECIFIED VESSEL OR LESION TYPE (CMS-HCC): Primary | ICD-10-CM

## 2023-10-05 PROCEDURE — 2500000004 HC RX 250 GENERAL PHARMACY W/ HCPCS (ALT 636 FOR OP/ED): Performed by: NURSE PRACTITIONER

## 2023-10-05 PROCEDURE — C1769 GUIDE WIRE: HCPCS | Performed by: INTERNAL MEDICINE

## 2023-10-05 PROCEDURE — 93005 ELECTROCARDIOGRAM TRACING: CPT | Mod: MUE

## 2023-10-05 PROCEDURE — 7100000009 HC PHASE TWO TIME - INITIAL BASE CHARGE: Performed by: INTERNAL MEDICINE

## 2023-10-05 PROCEDURE — 2500000005 HC RX 250 GENERAL PHARMACY W/O HCPCS: Performed by: INTERNAL MEDICINE

## 2023-10-05 PROCEDURE — 99152 MOD SED SAME PHYS/QHP 5/>YRS: CPT | Performed by: INTERNAL MEDICINE

## 2023-10-05 PROCEDURE — 99153 MOD SED SAME PHYS/QHP EA: CPT | Performed by: INTERNAL MEDICINE

## 2023-10-05 PROCEDURE — 2780000003 HC OR 278 NO HCPCS: Performed by: INTERNAL MEDICINE

## 2023-10-05 PROCEDURE — C1894 INTRO/SHEATH, NON-LASER: HCPCS | Performed by: INTERNAL MEDICINE

## 2023-10-05 PROCEDURE — G0269 OCCLUSIVE DEVICE IN VEIN ART: HCPCS | Mod: 59

## 2023-10-05 PROCEDURE — 2720000007 HC OR 272 NO HCPCS: Performed by: INTERNAL MEDICINE

## 2023-10-05 PROCEDURE — 2550000001 HC RX 255 CONTRASTS: Performed by: INTERNAL MEDICINE

## 2023-10-05 PROCEDURE — 93454 CORONARY ARTERY ANGIO S&I: CPT | Performed by: INTERNAL MEDICINE

## 2023-10-05 PROCEDURE — C1874 STENT, COATED/COV W/DEL SYS: HCPCS | Performed by: INTERNAL MEDICINE

## 2023-10-05 PROCEDURE — C1760 CLOSURE DEV, VASC: HCPCS | Performed by: INTERNAL MEDICINE

## 2023-10-05 PROCEDURE — 2500000004 HC RX 250 GENERAL PHARMACY W/ HCPCS (ALT 636 FOR OP/ED): Performed by: INTERNAL MEDICINE

## 2023-10-05 PROCEDURE — 7100000010 HC PHASE TWO TIME - EACH INCREMENTAL 1 MINUTE: Performed by: INTERNAL MEDICINE

## 2023-10-05 PROCEDURE — 92937 PRQ TRLUML REVSC CAB GRF 1: CPT | Performed by: INTERNAL MEDICINE

## 2023-10-05 PROCEDURE — C1887 CATHETER, GUIDING: HCPCS | Performed by: INTERNAL MEDICINE

## 2023-10-05 PROCEDURE — 93005 ELECTROCARDIOGRAM TRACING: CPT

## 2023-10-05 DEVICE — STENT ONYXNG30015UX ONYX 3.00X15RX
Type: IMPLANTABLE DEVICE | Site: HEART | Status: FUNCTIONAL
Brand: ONYX FRONTIER™

## 2023-10-05 DEVICE — STENT ONYXNG22508UX ONYX 2.25X08RX
Type: IMPLANTABLE DEVICE | Status: FUNCTIONAL
Brand: ONYX FRONTIER™

## 2023-10-05 DEVICE — STENT, ONYX FRONTIER DES, 2.75 X 22RX: Type: IMPLANTABLE DEVICE | Status: FUNCTIONAL

## 2023-10-05 RX ORDER — HEPARIN SODIUM 1000 [USP'U]/ML
INJECTION, SOLUTION INTRAVENOUS; SUBCUTANEOUS AS NEEDED
Status: DISCONTINUED | OUTPATIENT
Start: 2023-10-05 | End: 2023-10-05 | Stop reason: HOSPADM

## 2023-10-05 RX ORDER — FENTANYL CITRATE 50 UG/ML
INJECTION, SOLUTION INTRAMUSCULAR; INTRAVENOUS AS NEEDED
Status: DISCONTINUED | OUTPATIENT
Start: 2023-10-05 | End: 2023-10-05 | Stop reason: HOSPADM

## 2023-10-05 RX ORDER — ACETAMINOPHEN 325 MG/1
650 TABLET ORAL EVERY 6 HOURS PRN
Status: DISCONTINUED | OUTPATIENT
Start: 2023-10-05 | End: 2023-10-05 | Stop reason: HOSPADM

## 2023-10-05 RX ORDER — MIDAZOLAM HYDROCHLORIDE 1 MG/ML
INJECTION INTRAMUSCULAR; INTRAVENOUS AS NEEDED
Status: DISCONTINUED | OUTPATIENT
Start: 2023-10-05 | End: 2023-10-05 | Stop reason: HOSPADM

## 2023-10-05 RX ORDER — MORPHINE SULFATE 2 MG/ML
2 INJECTION, SOLUTION INTRAMUSCULAR; INTRAVENOUS
Status: DISCONTINUED | OUTPATIENT
Start: 2023-10-05 | End: 2023-10-05 | Stop reason: HOSPADM

## 2023-10-05 RX ORDER — SODIUM CHLORIDE 9 MG/ML
75 INJECTION, SOLUTION INTRAVENOUS CONTINUOUS
Status: DISCONTINUED | OUTPATIENT
Start: 2023-10-05 | End: 2023-10-05 | Stop reason: HOSPADM

## 2023-10-05 RX ORDER — LIDOCAINE HYDROCHLORIDE 20 MG/ML
INJECTION, SOLUTION INFILTRATION; PERINEURAL AS NEEDED
Status: DISCONTINUED | OUTPATIENT
Start: 2023-10-05 | End: 2023-10-05 | Stop reason: HOSPADM

## 2023-10-05 RX ORDER — ASPIRIN 325 MG
325 TABLET ORAL ONCE
Status: DISCONTINUED | OUTPATIENT
Start: 2023-10-05 | End: 2023-10-05 | Stop reason: HOSPADM

## 2023-10-05 RX ORDER — ONDANSETRON HYDROCHLORIDE 2 MG/ML
4 INJECTION, SOLUTION INTRAVENOUS EVERY 4 HOURS PRN
Status: DISCONTINUED | OUTPATIENT
Start: 2023-10-05 | End: 2023-10-05 | Stop reason: HOSPADM

## 2023-10-05 RX ADMIN — SODIUM CHLORIDE 75 ML/HR: 9 INJECTION, SOLUTION INTRAVENOUS at 11:38

## 2023-10-05 ASSESSMENT — PAIN - FUNCTIONAL ASSESSMENT
PAIN_FUNCTIONAL_ASSESSMENT: 0-10
PAIN_FUNCTIONAL_ASSESSMENT: 0-10

## 2023-10-05 ASSESSMENT — COLUMBIA-SUICIDE SEVERITY RATING SCALE - C-SSRS
2. HAVE YOU ACTUALLY HAD ANY THOUGHTS OF KILLING YOURSELF?: NO
6. HAVE YOU EVER DONE ANYTHING, STARTED TO DO ANYTHING, OR PREPARED TO DO ANYTHING TO END YOUR LIFE?: NO
1. IN THE PAST MONTH, HAVE YOU WISHED YOU WERE DEAD OR WISHED YOU COULD GO TO SLEEP AND NOT WAKE UP?: NO

## 2023-10-05 ASSESSMENT — PAIN SCALES - GENERAL: PAINLEVEL_OUTOF10: 0 - NO PAIN

## 2023-10-05 NOTE — Clinical Note
Dry, sterile, transparent dressing applied.  Parents wants to hold off on covid test until discussed with provider.

## 2023-10-05 NOTE — Clinical Note
Single view of the saphenous vein graft to the obtuse marginal obtained using hand injection. Angiogram to confirm placement of guide catheter

## 2023-10-05 NOTE — Clinical Note
Vessel: SVG (OM1). Stent inserted. Inflation 1: Pressure = 12 josee; Duration = 10 sec. Inflation 2: Pressure = 18 josee; Duration = 10 sec.

## 2023-10-05 NOTE — NURSING NOTE
1750 patient ambulated with standby assist to BR, voided. Returned to cart at 1755, rt femoral site remains stable. Biocclusive dressing dry/intact, no hematoma, no ecchymosis.

## 2023-10-05 NOTE — POST-PROCEDURE NOTE
Physician Transition of Care Summary  Invasive Cardiovascular Lab    Procedure Date: 10/5/2023  Attending:    * Deborah Oates - Primary  Resident/Fellow/Other Assistant: No surgical staff documented.    Pre Procedure Indications:   Worsening angina     Post Procedure Diagnosis:   Coronary disease    Procedure(s):   Percutaneous Coronary Intervention    Procedure Findings:   90% stenosis of ostial vein graft to obtuse marginal branch with 80% stenosis obtuse marginal branch, successful PTCA drug-eluting stents of the vein graft and the obtuse marginal.    Description of the Procedure:   PTCA drug-eluting stents of the vein graft and obtuse marginal branch    Complications:   None    Stents/Implants:     2.75 x 22, 2.25 x 8 mm drug-eluting stents of the obtuse marginal branch via the vein graft.  3.0 x 15 drug-eluting stent of ostial vein graft  Anticoagulation/Antiplatelet Plan:   Aspirin and Brilinta    Estimated Blood Loss:   * No values recorded between 10/5/2023  1:41 PM and 10/5/2023  2:36 PM *    Anesthesia: Moderate Sedation Anesthesia Staff: No anesthesia staff entered.    Any Specimen(s) Removed:   No specimens collected during this procedure.    Disposition:   Continue oral dual antiplatelet therapy.  Access was right common femoral artery.  Closure device was used      Electronically signed by: Deborah Oates MD, 10/5/2023 2:36 PM

## 2023-10-05 NOTE — NURSING NOTE
Upon chart review, noted that order for Aspirin 324 mg on emar flagged as overdue, spoke to handoff nurse, she confirms that patient took Aspirin 81 mg and his AM dose of Brilinta today, therefore no aspirin given. Asked Epic helper how to edwige medication on emar as not given, he was not able to assist.

## 2023-10-05 NOTE — DISCHARGE INSTRUCTIONS
You have received handouts:  1) Coronary Stent Discharge Instructions  2) Heart Healthy Diet  3) Cardiac Rehab information  4) Medication Education: Coreg, Losartan, Aspirin, Brilinta, Atorvastatin and Imdur  5) Going Home on Blood Thinners  6) Wound Care for Arterial Puncture Discharge Instructions (Right groin site)    Dr. Oates's office will contact you tomorrow to be seen in 1-2 weeks for a follow up appointment. [phone]

## 2023-10-05 NOTE — NURSING NOTE
Patient education given regarding today's procedure, stent card and femoral site care, Cardiac Rehab and medication education. Patient verbalized understanding. Rt groin site remains stable, pedal pulses palpable.

## 2023-10-05 NOTE — Clinical Note
Single view of the circumflex artery obtained using power injection. Angiogram to confirm placement of guide catheter

## 2023-10-05 NOTE — NURSING NOTE
Patient is S/P PCI with 3 VALENTINA via rt femoral site. Rt groin site with biocclusive dressing dry/intact, no hematoma, no ecchymosis. Patient instructed to remain flat on bedrest first hour, with rt leg immobile and that nurse will check femoral site frequently.

## 2023-10-05 NOTE — PROGRESS NOTES
Discussed results of PCI with patient and his family member.  Pictures provided.  Patient underwent successful PCI and VALENTINA placement to the ostial vein graft to OM reducing that 90% lesion down to 0% stenosis.  He also underwent successful PCI with 2 VALENTINA to the 80% stenosis in the OM reducing that lesion down to 0% stenosis.  He tolerated the procedure well.  Please see procedural report for complete details.  Patient will continue DAPT with aspirin 81 mg daily and Brilinta 90 mg twice daily.  He will be discharged home later today barring no complications.  He will be scheduled to follow-up with Dr. Oates in 1 to 2 weeks.  All questions answered.  Both verbalized understanding.    Keila Mark, APRN-CNP

## 2023-10-05 NOTE — Clinical Note
Single view of the saphenous vein graft to the obtuse marginal obtained using hand injection. Angiogram to confirm placement of interventional wire

## 2023-10-06 LAB
ATRIAL RATE: 59 BPM
ATRIAL RATE: 63 BPM
P AXIS: -4 DEGREES
P AXIS: 11 DEGREES
P OFFSET: 157 MS
P OFFSET: 161 MS
P ONSET: 103 MS
P ONSET: 108 MS
PR INTERVAL: 222 MS
PR INTERVAL: 224 MS
Q ONSET: 215 MS
Q ONSET: 219 MS
QRS COUNT: 10 BEATS
QRS COUNT: 11 BEATS
QRS DURATION: 78 MS
QRS DURATION: 84 MS
QT INTERVAL: 392 MS
QT INTERVAL: 428 MS
QTC CALCULATION(BAZETT): 401 MS
QTC CALCULATION(BAZETT): 423 MS
QTC FREDERICIA: 398 MS
QTC FREDERICIA: 425 MS
R AXIS: -23 DEGREES
R AXIS: 3 DEGREES
T AXIS: 106 DEGREES
T AXIS: 120 DEGREES
T OFFSET: 415 MS
T OFFSET: 429 MS
VENTRICULAR RATE: 59 BPM
VENTRICULAR RATE: 63 BPM

## 2023-10-06 PROCEDURE — 93010 ELECTROCARDIOGRAM REPORT: CPT | Performed by: INTERNAL MEDICINE

## 2023-10-06 NOTE — NURSING NOTE
Patient discharged to home via w/c, accompanied by daughter. Final groin site and pedal pulses remain stable. IV removed from lt hand, catheter intact, bandaid applied.

## 2023-10-10 NOTE — H&P
"History Of Present Illness  Alireza Bellamy is a 68 y.o. male presenting with angina and coronary disease.     Past Medical History  Past Medical History:   Diagnosis Date    Coronary artery disease     Hyperlipidemia     Hypertension        Surgical History  Past Surgical History:   Procedure Laterality Date    CARDIAC CATHETERIZATION  09/27/2023    4 VALENTINA to Circ    CARDIAC CATHETERIZATION N/A 10/5/2023    Procedure: PCI VALENTINA Stent- Coronary;  Surgeon: Deborah Oates MD;  Location: [facility];  Service: Cardiovascular;  Laterality: N/A;  PCI of the SVG to OM1 and OM2. IV NS 75/hr for 2 hours prior to procedure. BMP on admit.    CARDIAC CATHETERIZATION N/A 10/5/2023    Procedure: Left Heart Cath, No LV;  Surgeon: Deborah Oates MD;  Location: [facility];  Service: Cardiovascular;  Laterality: N/A;    CORONARY ANGIOPLASTY  09/27/2023    4 VALENTINA to Circ    CORONARY ARTERY BYPASS GRAFT  2008    OTHER SURGICAL HISTORY  05/05/2022    Carpal tunnel surgery    OTHER SURGICAL HISTORY  05/05/2022    Ankle surgery    OTHER SURGICAL HISTORY  05/05/2022    Knee replacement    OTHER SURGICAL HISTORY  05/05/2022    Renal lithotripsy    OTHER SURGICAL HISTORY  05/05/2022    Sinus surgery        Social History  He reports that he has never smoked. His smokeless tobacco use includes chew. He reports that he does not drink alcohol and does not use drugs.    Family History  No family history on file.     Allergies  Patient has no known allergies.    Review of Systems     Physical Exam     Last Recorded Vitals  Blood pressure 118/74, pulse 56, temperature 36.2 °C (97.2 °F), temperature source Temporal, resp. rate 16, height 1.727 m (5' 8\"), weight 92.8 kg (204 lb 9.4 oz), SpO2 98 %.    Relevant Results        Angina for staged PCI coronary disease     Assessment/Plan   Active Problems:    Coronary artery disease involving native heart      Coronary disease       I spent 20 minutes in the professional and overall care " of this patient.      Deborah Oates MD

## 2023-10-11 ENCOUNTER — DOCUMENTATION (OUTPATIENT)
Dept: INPATIENT UNIT | Facility: HOSPITAL | Age: 68
End: 2023-10-11
Payer: MEDICARE

## 2023-10-11 PROBLEM — E66.9 OBESITY WITH BODY MASS INDEX 30 OR GREATER: Status: ACTIVE | Noted: 2023-10-11

## 2023-10-11 PROBLEM — Z95.1 S/P CORONARY ARTERY BYPASS GRAFT X 5: Status: ACTIVE | Noted: 2018-10-01

## 2023-10-11 PROBLEM — I10 ESSENTIAL HYPERTENSION: Status: ACTIVE | Noted: 2023-10-11

## 2023-10-11 PROBLEM — M19.019 PRIMARY LOCALIZED OSTEOARTHROSIS OF SHOULDER REGION: Status: ACTIVE | Noted: 2023-10-11

## 2023-10-11 PROBLEM — G56.00 CARPAL TUNNEL SYNDROME: Status: ACTIVE | Noted: 2023-10-11

## 2023-10-11 PROBLEM — R07.9 CHEST PAIN: Status: ACTIVE | Noted: 2023-10-11

## 2023-10-11 RX ORDER — DOXAZOSIN 4 MG/1
1 TABLET ORAL NIGHTLY
COMMUNITY
Start: 2019-05-09 | End: 2023-10-12 | Stop reason: ALTCHOICE

## 2023-10-11 RX ORDER — AMLODIPINE BESYLATE 5 MG/1
5 TABLET ORAL NIGHTLY
COMMUNITY
Start: 2023-09-11 | End: 2023-10-12 | Stop reason: ALTCHOICE

## 2023-10-11 RX ORDER — PRAVASTATIN SODIUM 40 MG/1
40 TABLET ORAL NIGHTLY
COMMUNITY
End: 2023-10-12 | Stop reason: ALTCHOICE

## 2023-10-11 RX ORDER — HYDROCHLOROTHIAZIDE 12.5 MG/1
1 TABLET ORAL DAILY PRN
COMMUNITY
End: 2023-10-12 | Stop reason: ALTCHOICE

## 2023-10-11 RX ORDER — PETROLATUM,WHITE/LANOLIN
1 OINTMENT (GRAM) TOPICAL DAILY
COMMUNITY
End: 2023-10-12 | Stop reason: ALTCHOICE

## 2023-10-11 RX ORDER — OXYCODONE HYDROCHLORIDE 5 MG/1
5 TABLET ORAL EVERY 4 HOURS PRN
COMMUNITY
Start: 2015-10-15 | End: 2023-10-12 | Stop reason: ALTCHOICE

## 2023-10-11 RX ORDER — ACETAMINOPHEN 500 MG
1000 TABLET ORAL 3 TIMES DAILY
COMMUNITY
Start: 2015-09-15 | End: 2023-10-12 | Stop reason: ALTCHOICE

## 2023-10-11 RX ORDER — DOCUSATE SODIUM 100 MG/1
100 CAPSULE, LIQUID FILLED ORAL 2 TIMES DAILY
COMMUNITY
Start: 2015-09-15 | End: 2023-10-12 | Stop reason: ALTCHOICE

## 2023-10-11 RX ORDER — MORPHINE SULFATE 15 MG/1
15 TABLET, FILM COATED, EXTENDED RELEASE ORAL 2 TIMES DAILY
COMMUNITY
Start: 2015-09-18 | End: 2023-10-12 | Stop reason: ALTCHOICE

## 2023-10-11 RX ORDER — FERROUS SULFATE 325(65) MG
1 TABLET ORAL
COMMUNITY
Start: 2015-09-15 | End: 2023-10-12 | Stop reason: ALTCHOICE

## 2023-10-11 RX ORDER — CARVEDILOL 6.25 MG/1
1 TABLET ORAL 2 TIMES DAILY
COMMUNITY
Start: 2022-06-03 | End: 2023-10-12 | Stop reason: ALTCHOICE

## 2023-10-11 RX ORDER — OXYCODONE AND ACETAMINOPHEN 5; 325 MG/1; MG/1
1 TABLET ORAL EVERY 4 HOURS PRN
COMMUNITY
End: 2023-10-12 | Stop reason: ALTCHOICE

## 2023-10-11 RX ORDER — ENOXAPARIN SODIUM 100 MG/ML
40 INJECTION SUBCUTANEOUS
COMMUNITY
Start: 2015-09-15 | End: 2023-10-12 | Stop reason: ALTCHOICE

## 2023-10-11 RX ORDER — ASCORBIC ACID 500 MG
1 TABLET ORAL
COMMUNITY
Start: 2015-09-15 | End: 2023-10-12 | Stop reason: ALTCHOICE

## 2023-10-11 RX ORDER — EPINEPHRINE 0.22MG
100 AEROSOL WITH ADAPTER (ML) INHALATION DAILY
COMMUNITY

## 2023-10-11 RX ORDER — NAPROXEN SODIUM 220 MG
220 TABLET ORAL AS NEEDED
COMMUNITY
End: 2023-10-12 | Stop reason: ALTCHOICE

## 2023-10-12 ENCOUNTER — OFFICE VISIT (OUTPATIENT)
Dept: CARDIOLOGY | Facility: CLINIC | Age: 68
End: 2023-10-12
Payer: MEDICARE

## 2023-10-12 VITALS
HEART RATE: 63 BPM | DIASTOLIC BLOOD PRESSURE: 80 MMHG | SYSTOLIC BLOOD PRESSURE: 118 MMHG | BODY MASS INDEX: 31.31 KG/M2 | WEIGHT: 206.6 LBS | HEIGHT: 68 IN

## 2023-10-12 DIAGNOSIS — I10 ESSENTIAL HYPERTENSION: ICD-10-CM

## 2023-10-12 DIAGNOSIS — E78.2 MIXED HYPERLIPIDEMIA: ICD-10-CM

## 2023-10-12 DIAGNOSIS — I25.10 CAD S/P PERCUTANEOUS CORONARY ANGIOPLASTY: ICD-10-CM

## 2023-10-12 DIAGNOSIS — Z78.9 NEVER SMOKED TOBACCO: ICD-10-CM

## 2023-10-12 DIAGNOSIS — Z98.61 CAD S/P PERCUTANEOUS CORONARY ANGIOPLASTY: ICD-10-CM

## 2023-10-12 DIAGNOSIS — E66.9 OBESITY WITH BODY MASS INDEX 30 OR GREATER: ICD-10-CM

## 2023-10-12 DIAGNOSIS — Z95.1 S/P CORONARY ARTERY BYPASS GRAFT X 5: ICD-10-CM

## 2023-10-12 PROCEDURE — 1159F MED LIST DOCD IN RCRD: CPT | Performed by: INTERNAL MEDICINE

## 2023-10-12 PROCEDURE — 99214 OFFICE O/P EST MOD 30 MIN: CPT | Performed by: INTERNAL MEDICINE

## 2023-10-12 PROCEDURE — 1111F DSCHRG MED/CURRENT MED MERGE: CPT | Performed by: INTERNAL MEDICINE

## 2023-10-12 PROCEDURE — 3074F SYST BP LT 130 MM HG: CPT | Performed by: INTERNAL MEDICINE

## 2023-10-12 PROCEDURE — 1126F AMNT PAIN NOTED NONE PRSNT: CPT | Performed by: INTERNAL MEDICINE

## 2023-10-12 PROCEDURE — 3079F DIAST BP 80-89 MM HG: CPT | Performed by: INTERNAL MEDICINE

## 2023-10-12 RX ORDER — LOSARTAN POTASSIUM 25 MG/1
25 TABLET ORAL DAILY
Qty: 90 TABLET | Refills: 3 | Status: SHIPPED | OUTPATIENT
Start: 2023-10-12 | End: 2024-10-11

## 2023-10-12 RX ORDER — ISOSORBIDE MONONITRATE 30 MG/1
30 TABLET, EXTENDED RELEASE ORAL DAILY
Qty: 90 TABLET | Refills: 3 | Status: SHIPPED | OUTPATIENT
Start: 2023-10-12 | End: 2024-10-11

## 2023-10-12 RX ORDER — CARVEDILOL 12.5 MG/1
12.5 TABLET ORAL
Qty: 180 TABLET | Refills: 3 | Status: SHIPPED | OUTPATIENT
Start: 2023-10-12 | End: 2024-10-11

## 2023-10-12 RX ORDER — ATORVASTATIN CALCIUM 80 MG/1
80 TABLET, FILM COATED ORAL DAILY
Qty: 90 TABLET | Refills: 3 | Status: SHIPPED | OUTPATIENT
Start: 2023-10-12 | End: 2024-10-11

## 2023-10-12 NOTE — PROGRESS NOTES
Referred by Dr. Kendall ref. provider found provider found for   Chief Complaint   Patient presents with    Hospital Follow-up        History of Present Illness  Alireza Bellamy is a 68 y.o. year old male patient status post coronary angioplasty with stent implantation of the vein graft to obtuse marginal branch and 2 circumflex.  Was complex angioplasty.  Doing well from a cardiac standpoint no complaint no symptoms of chest pain or shortness of breath.  He has been ambulating with no difficulty.  He does have a vein graft to first obtuse marginal branch was severely degenerated.  I discussed with the patient at length we will continue medication will call for any problems and follow-up as scheduled.  We talked about dual antiplatelet therapy and increase activity and exercise.  I offered the cardiac rehab program but the patient would like to exercise on his own    Past Medical History  Past Medical History:   Diagnosis Date    Coronary artery disease     Hyperlipidemia     Hypertension        Social History  Social History     Tobacco Use    Smoking status: Never    Smokeless tobacco: Current     Types: Chew   Vaping Use    Vaping Use: Never used   Substance Use Topics    Alcohol use: Never    Drug use: Never       Family History     Family History   Problem Relation Name Age of Onset    Coronary artery disease Mother      Other (PTCA) Mother      Leukemia Father         Review of Systems  As per HPI, all other systems reviewed and negative.    Allergies:  No Known Allergies     Outpatient Medications:  Current Outpatient Medications   Medication Instructions    aspirin 81 mg, oral, Daily    atorvastatin (LIPITOR) 80 mg, oral, Daily    carvedilol (Coreg) 12.5 mg tablet oral, 2 times daily with meals    coenzyme Q-10 100 mg, oral, Daily    isosorbide mononitrate ER (IMDUR) 30 mg, oral, Daily, Do not crush or chew.    losartan (COZAAR) 25 mg, oral, Daily    nitroglycerin (NITROSTAT) 0.4 mg, sublingual, Every 5 min PRN     pravastatin (PRAVACHOL) 40 mg, oral, Nightly    ticagrelor (Brilinta) 90 mg tablet TAKE 1 TABLET BY MOUTH TWO TIMES A DAY         Vitals:  There were no vitals filed for this visit.    Physical Exam:  Physical Exam  Vitals and nursing note reviewed.   Constitutional:       Appearance: Normal appearance.   HENT:      Head: Normocephalic and atraumatic.   Eyes:      Extraocular Movements: Extraocular movements intact.      Pupils: Pupils are equal, round, and reactive to light.   Cardiovascular:      Rate and Rhythm: Normal rate and regular rhythm.      Pulses: Normal pulses.   Pulmonary:      Effort: Pulmonary effort is normal.      Breath sounds: Normal breath sounds.   Musculoskeletal:         General: Normal range of motion.      Cervical back: Normal range of motion.      Right lower leg: No edema.      Left lower leg: No edema.   Skin:     General: Skin is warm and dry.   Neurological:      General: No focal deficit present.      Mental Status: He is alert and oriented to person, place, and time.             Assessment/Plan           Deborah Oates MD [facility]  Interventional Cardiology   of [facility]     Thank you for allowing me to participate in the care of this patient. Please do not hesitate to contact me with any further questions or concerns.

## 2023-10-12 NOTE — PATIENT INSTRUCTIONS
Follow up office visit in 6 months.  Continue same medications/treatment.  Patient educated on proper medication use.  Please bring all medicines, vitamins and herbal supplements with you when you come to the office.    I, Olga Rodriguez LPN, am scribing for and in the presence of  Dr. Deborah Oates MD, FACC

## 2024-01-04 DIAGNOSIS — I25.10 CAD S/P PERCUTANEOUS CORONARY ANGIOPLASTY: ICD-10-CM

## 2024-01-04 DIAGNOSIS — Z98.61 CAD S/P PERCUTANEOUS CORONARY ANGIOPLASTY: ICD-10-CM

## 2024-01-04 NOTE — TELEPHONE ENCOUNTER
Received request for prescription refills for patient. Electronic refill request from [facility] asking for 90 day supply to be sent to local pharmacy.     Patient follows with Dr. Deborah Oates MD [facility]     Last OV 10/12/23  Next OV 4/2024    Pended for signing and sent to provider

## 2024-03-06 NOTE — CONSULTS
Service:   Service: Cardiology     Consult:  Consult requested by (Attending Name): Rosalie Brar   Reason: NSTEMI     History of Present Illness:   HPI:    Chief complaint: Chest pain    History of present illness:  YARITZA OHARA is a 68 year old Male with past medical history significant for coronary artery disease, coronary artery bypass 2008, hypertension, hyperlipidemia who presents with chest pain.  On Friday night at 11 PM after getting into bed patient  developed chest tightness.  He had no relief with taking Tums or not sublingual nitroglycerin.  He had nausea and vomiting.  He went to the emergency room and had eventual relief with IV morphine.  He has had recurrent chest tightness with physical exertion  while in the hospital.  Denies dyspnea, palpitations, dizziness, near syncope, evangelina syncope, edema.    Past surgical history:  Coronary artery bypass  Ankle surgery  Carpal tunnel release  Bilateral total knee replacement  Renal lithotripsy  Sinus surgery    Family history:  Mother history of coronary disease/PCI  Brother  leukemia    Social history:  Quit smoking age 38, smoked 10 cigars/day for 20 years  Denies alcohol use    Review of systems have been reviewed and are negative, noncontributory, as previous mentioned x12 systems.    I personally reviewed EKG and chest x-ray:   EKG: Sinus bradycardia first-degree AV block, nonspecific T wave abnormality   chest x-ray: No acute cardiopulmonary disease  Admission peak troponin 10,650    Review Family/Social History and ROS:   Social History:    Smoking Status: never smoker  (1)   Alcohol Use: denies (1)   Drug Use: denies  (1)   Drug 2 Use: denies  (1)            Allergies:  ·  No Known Allergies :     Objective:     Objective Information:        T   P  R  BP   MAP  SpO2   Value  36.5  60  16  146/77   103  95%  Date/Time  7:00  7:00  7:00  7:00   7:00  7:00  Range  (36.3C - 37C )  (57  - 62 )  (16 - 20 )  (117 - 146 )/ (67 - 77 )  (92 - 103 )  (90% - 96% )   As of 23-Sep-2023 18:00:00, patient is on 2 L/min of oxygen via room air.  Highest temp of 37 C was recorded at 9/24 1:00         Weights   9/24 7:09: Weight in kg (Weight (kg))  95.4  9/24 7:09: Weight in lbs ((lbs))  210.3  9/23 18:15: BMI (kg/m2) (BMI (kg/m2))  31.88    Physical Exam by System:    Constitutional: Well developed, awake/alert/oriented  x3, no distress, alert and cooperative   Eyes: PERRL, EOMI, clear sclera   ENMT: mucous membranes moist, no apparent injury,  no lesions seen   Head/Neck: Neck supple, no apparent injury,No JVD,  trachea midline, no bruits   Respiratory/Thorax: Patent airways, CTAB, normal  breath sounds with good chest expansion, thorax symmetric   Cardiovascular: Regular, rate and rhythm, no murmurs,  2+ equal pulses of the extremities, normal S 1and S 2   Gastrointestinal: Nondistended, soft, non-tender,  no rebound tenderness or guarding, no masses palpable, no organomegaly, +BS,   Musculoskeletal: ROM intact, no joint swelling, normal  strength   Extremities: normal extremities, no cyanosis edema,  contusions or wounds, no clubbing   Neurological: alert and oriented x3   Lymphatic: No significant lymphadenopathy   Psychological: Appropriate mood and behavior   Skin: Warm and dry, no lesions, no rashes     Medications:    Medications:          Continuous Medications       --------------------------------    1. Heparin 25,000 units/ D5W 250 mL Infusion:  1000  units/hr  IntraVenous  <Continuous>         Scheduled Medications       --------------------------------    1. Aspirin Chewable:  81  mg  Oral  Daily    2. Atorvastatin:  80  mg  Oral  At Bedtime    3. Docusate:  100  mg  Oral  2 Times a Day    4. Heparin (Initial LOAD) Injectable.:  4000  unit(s)  IntraVenous Push  Once         PRN Medications       --------------------------------    1. Acetaminophen:  650  mg  Oral  Every 4 Hours    2.  Acetaminophen:  650  mg  Oral  Every 4 Hours    3. Melatonin:  3  mg  Oral  At Bedtime    4. Morphine Injectable:  2  mg  IntraVenous Push  Every 3 Minutes    5. Nitroglycerin SubLingual:  0.4  mg  SubLingual  Every 5 Minutes    6. Ondansetron Injectable:  4  mg  IntraVenous Push  Every 4 Hours    7. Sodium Chloride 0.9% Injectable Flush:  10  mL  IntraVenous Flush  Every 8 Hours and as Needed    8. traMADol:  50  mg  Oral  Every 8 Hours         Conditional Medication Orders       --------------------------------    1. Perflutren Lipid Microsphere (Activated) 1.3 mL / NaCL 0.9% T.V. 10 mL Injectable:  0.5  mL  IntraVenous Push  Once         Currently Suspended Medications       --------------------------------    1. Carvedilol:  12.5  mg  Oral  2 Times a Day      Recent Lab Results:    Results:        I have reviewed these laboratory results:    Lipid Panel  24-Sep-2023 03:29:00      Result Value    Cholesterol, Serum  124 .    AGE      DESIRABLE   BORDERLINE HIGH   HIGH   0-19 Y     0 - 169       170 - 199     >/= 200  20-24 Y     0 - 189       190 - 224     >/= 225        >24 Y     0 - 199       200 - 239     >/= 240 **All ranges are based on fasting [facility]    HDL Cholesterol, Serum  25.9 .    AGE      VERY LOW   LOW     NORMAL    HIGH     0-19 Y       < 35   < 40     40-45     ----  20-24 Y       ----   < 40       >45     ----    >24  Y       ----   < 40     40-60      >60.   A   Cholesterol/HDL Ratio  4.8 REF VALUESDESIRABLE  < 3.4HIGH RISK  > 5.0    LDL, Level  72 .                         NEAR      BORD    AGE      DESIRABLE  OPTIMAL    HIGH     HIGH     VERY HIGH   0-19 Y     0 - 109     ---    110-129   >/= 130      ----  20-24 Y     0 - 119     ---    120-159   >/= 160     ----    >24 Y     0 -    VLDL, Serum  26    Triglycerides, Serum  132 .    AGE      DESIRABLE   BORDERLINE HIGH   HIGH     VERY HIGH 0 D-90 D    19 - 174         ----         ----        ----91 D- 9 Y     0 -  74        75  -  99     >/=  100      ----  10-19 Y     0 -  89        90 - 129     >/= 130      ----      Activated Partial Thromboplastin Time  24-Sep-2023 03:29:00      Result Value    Activated Partial Thromboplastin Time  55   H     Heparin assay, UFH  Trending View      Result 24-Sep-2023 03:29:00  23-Sep-2023 23:27:00    Heparin assay, UFH 0.4   0.3        Troponin I, High Sensitivity  Trending View      Result 24-Sep-2023 03:29:00  23-Sep-2023 21:33:00  23-Sep-2023 20:36:00  23-Sep-2023 18:31:00    Troponin I, High Sensitivity 77524   H   9243   H   8355   H   8173       Lab Comment:       CRIT TRPHS CALLED RB TO VINCENT JOHN, 09/23/2023 19:59RB TO MD EVERTON ROGERS, 09/23/2023 19:38RB TO MD EVERTON ROGERS, 09/23/2023 19:38        Complete Blood Count  23-Sep-2023 18:31:00      Result Value    White Blood Cell Count  11.3    Red Blood Cell Count  4.07   L   HGB  12.9   L   HCT  38.3   L   MCV  94    MCHC  33.7    PLT  266    RDW-CV  13.5      Coagulation Screen  23-Sep-2023 18:31:00      Result Value    Prothrombin Time, Plasma  13.0   H   International Normalized Ratio, Plasma  1.2   H   Activated Partial Thromboplastin Time  34      Basic Metabolic Panel  23-Sep-2023 18:31:00      Result Value    Glucose, Serum  105   H   NA  138    K  3.7    CL  105    Bicarbonate, Serum  26    Anion Gap, Serum  11    BUN  26   H   CREAT  1.27    GFR Male  61    Calcium, Serum  9.5        Radiology Results:    Results:    No Results have been selected.  Please select Results from the Available Results list before marking as Reviewed.      Conclusion:  Sinus rhythm with occasional Premature ventricular complexes  Nonspecific ST and T wave abnormality  Abnormal ECG  When compared with ECG of 05-JUL-2013 10:29,  Premature ventricular complexes are now Present  Criteria for Inferior infarct are no longer Present  ST no longer elevated in Inferior leads  Confirmed by Ever Burk ([id number]) on 9/24/2023 8:44:37 AM     Electrocardiogram 12  "Lead [Sep 24 2023  8:44AM]        Assessment:    Assessment:  Non-ST elevation myocardial infarction  Coronary artery disease/CABG 2008  Hypertension  Hyperlipidemia    Recommendations:  Cardiac catheterization-risk and benefit discussed with patient willing to proceed  Echocardiogram  Heparin drip  Aspirin  Statin  Beta-blocker  Oral nitrate  Load with Brilinta    Consult Status:  Consult Status    (select all that apply): initial  consult complete, will follow   Consult Order ID: [id number]       Electronic Signatures:  Ever Burk ()  (Signed 24-Sep-2023 09:17)   Authored: Service, History of Present Illness, Review  Family/Social History and ROS, Allergies, Objective, Assessment/Recommendations, Note Completion      Last Updated: 24-Sep-2023 09:17 by Ever Burk ()    References:  1.  Data Referenced From \"History and Physical\" 23-Sep-2023 19:58   "

## 2024-03-06 NOTE — CONSULTS
Service:   Service: Cardiac Surgery     Consult:  Consult requested by (Attending Name): Roslaie Brar   Reason: Severe multivessel CAD/Dr. Oates is recommending  redo CABG vs. high risk PCI/Recommendations appreciated     History of Present Illness:   Admission Reason: chest pain   HPI:    YARITZA OHARA is a 68 year old Male with history of coronary artery disease status post CABG in 2008, hypertension, and hyperlipidemia.  Initially presented to  [facility] with chest pain that occurred last night while he was laying down for bed.  After taking both Tums and nitroglycerin without relief he was brought to the ED.  EKG showing nonspecific ST wave abnormalities, and elevated troponin of  8173 with peak at 10,650.  He was subsequently transferred to [facility] for further evaluation.  Underwent coronary angiography today with findings of severe multivessel disease involving saphenous vein grafts and native vessels.  RAMOS to LAD was patent,  saphenous vein graft to the first obtuse marginal with a subtotal occlusion, saphenous vein graft to the second obtuse marginal with severe multifocal areas of stenosis, the native circumflex itself severely diseased.  Saphenous vein graft to the RCA  is patent with high-grade stenosis distal to the anastomosis.  Cardiac surgery asked see the patient for possible surgical revascularization versus high risk PCI.    At time of consultation, the patient is in no distress.  He is normotensive, afebrile, maintaining sinus rhythm with adequate saturations on room air.  No recurring episodes of chest pain.  No exertional dyspnea, no PND, no orthopnea, no palpitations  or lightheadedness.  No other active systemic complaints.  Findings were reviewed with Dr. Boone and discussed with Dr. Oates, patient is more suitable for high risk PCI as risks of redo bypass surgery outweigh potential benefit.    Review Family/Social History and ROS:     Review Family/Social History  and ROS:       I have reviewed the family and social history and review of systems from the History and Physical.    Social History:    Smoking Status: never smoker  (1)   Alcohol Use: denies (1)   Drug Use: denies  (1)   Drug 2 Use: denies  (1)     Constitutional: NEGATIVE: Fever, Chills, Anorexia,  Weight Loss, Malaise     Eyes: NEGATIVE: Blurry Vision, Drainage, Diploplia,  Redness, Vision Loss/ Change     ENMT: NEGATIVE: Nasal Discharge, Nasal Congestion,  Ear Pain, Mouth Pain, Throat Pain     Respiratory: NEGATIVE: Dry Cough, Productive Cough,  Hemoptysis, Wheezing, Shortness of Breath     Cardiac: POSITIVE: Chest Pain; NEGATIVE: Dyspnea  on Exertion, Orthopnea, Palpitations, Syncope     Gastrointestinal: NEGATIVE: Nausea, Vomiting, Diarrhea,  Constipation, Abdominal Pain     Genitourinary: NEGATIVE: Discharge, Dysuria, Flank  Pain, Frequency, Hematuria     Musculoskeletal: NEGATIVE: Decreased ROM, Pain, Swelling,  Stiffness, Weakness     Neurological: NEGATIVE: Dizziness, Confusion, Headache,  Seizures, Syncope     Psychiatric: NEGATIVE: Mood Changes, Anxiety, Hallucinations,  Sleep Changes, Suicidal Ideas     Skin: NEGATIVE: Mass, Pain, Pruritus, Rash, Ulcer     Endocrine: NEGATIVE: Heat Intolerance, Cold Intolerance,  Sweat, Polyuria, Thirst     Hematologic/Lymph: NEGATIVE: Anemia, Bruising, Easy  Bleeding, Night Sweats, Petechiae     Allergic/Immunologic: NEGATIVE: Anaphylaxis, Itchy/  Teary Eyes, Itching, Sneezing, Swelling     Breast: NEGATIVE: Pain, Mass, Discharge, Nipple Itching,  Gynecomastia     All Other Systems: All other systems reviewed and  are negative            Allergies:  ·  No Known Allergies :     Objective:     Objective Information:        T   P  R  BP   MAP  SpO2   Value  36.7  60  18  126/80   99  97%  Date/Time 9/25 14:32 9/25 15:35 9/25 15:08 9/25 15:35  9/25 15:35 9/25 15:35  Range  (36C - 37.3C )  (54 - 66 )  (16 - 20 )  (115 - 146 )/ (57 - 80 )  (83 - 103 )  (91% - 97% )  Highest  temp of 37.3 C was recorded at 9/24 20:08        Pain reported at 9/25 8:00: 0 = None         Intake                                   Output                                                      Urine                  760 mL    Physical Exam by System:    Constitutional: Well developed, awake/alert/oriented  x3, no distress, alert and cooperative   Eyes: PERRL, EOMI, clear sclera   ENMT: mucous membranes moist, no apparent injury,  no lesions seen   Head/Neck: Neck supple, no apparent injury, thyroid  without mass or tenderness, No JVD, trachea midline, no bruits   Respiratory/Thorax: Patent airways, CTAB, normal  breath sounds with good chest expansion, thorax symmetric   Cardiovascular: Regular, rate and rhythm, no murmurs,  2+ equal pulses of the extremities, normal S 1and S 2   Gastrointestinal: Nondistended, soft, non-tender,  no rebound tenderness or guarding, no masses palpable, no organomegaly, +BS, no bruits   Genitourinary: deferred   Musculoskeletal: ROM intact, no joint swelling, normal  strength   Extremities: normal extremities, no cyanosis edema,  contusions or wounds, no clubbing   Neurological: alert and oriented x3, intact senses,  motor, response and reflexes, normal strength   Breast: deferred   Lymphatic: No significant lymphadenopathy   Psychological: Appropriate mood and behavior   Skin: Warm and dry, no lesions, no rashes     Medications:    Medications:          Continuous Medications       --------------------------------    1. Heparin 25,000 units/ D5W 250 mL Infusion:  1000  units/hr  IntraVenous  <Continuous>    2. Sodium Chloride 0.9% Infusion:  1000  mL  IntraVenous  <Continuous>         Scheduled Medications       --------------------------------    1. Aspirin Chewable:  81  mg  Oral  Daily    2. Atorvastatin:  80  mg  Oral  At Bedtime    3. Docusate:  100  mg  Oral  2 Times a Day    4. Heparin (Initial LOAD) Injectable.:  4000  unit(s)  IntraVenous Push  Once    5. Isosorbide  Mononitrate Extended Release:  30  mg  Oral  Daily    6. Metoprolol Tartrate:  12.5  mg  Oral  2 Times a Day         PRN Medications       --------------------------------    1. Acetaminophen:  650  mg  Oral  Every 4 Hours    2. Acetaminophen:  650  mg  Oral  Every 4 Hours    3. Melatonin:  3  mg  Oral  At Bedtime    4. Morphine Injectable:  2  mg  IntraVenous Push  Every 3 Minutes    5. Nitroglycerin SubLingual:  0.4  mg  SubLingual  Every 5 Minutes    6. Ondansetron Injectable:  4  mg  IntraVenous Push  Every 4 Hours    7. Sodium Chloride 0.9% Injectable Flush:  10  mL  IntraVenous Flush  Every 8 Hours and as Needed    8. traMADol:  50  mg  Oral  Every 8 Hours         Conditional Medication Orders       --------------------------------    1. Perflutren Lipid Microsphere (Activated) 1.3 mL / NaCL 0.9% T.V. 10 mL Injectable:  0.5  mL  IntraVenous Push  Once         Currently Suspended Medications       --------------------------------    1. Carvedilol:  12.5  mg  Oral  2 Times a Day    2. Ticagrelor Maintenance Dose:  90  mg  Oral  2 Times a Day      Recent Lab Results:    Results:        I have reviewed these laboratory results:    Heparin assay, [facility]  [facility]      Result 25-Sep-2023 10:32:00  25-Sep-2023 05:43:00    Heparin assay, [facility] 0.3   0.2        Complete Blood Count  25-Sep-2023 05:43:00      Result Value    White Blood Cell Count  13.0   H   Red Blood Cell Count  4.09   L   HGB  12.7   L   HCT  38.1   L   MCV  93    MCHC  33.3    PLT  247    RDW-CV  13.5      Basic Metabolic Panel  25-Sep-2023 05:43:00      Result Value    Glucose, Serum  107   H   NA  137    K  3.7    CL  104    Bicarbonate, Serum  24    Anion Gap, Serum  13    BUN  22    CREAT  1.44   H   GFR Male  52   A   Calcium, Serum  8.9        Radiology Results:    Results:        Conclusion:  CONCLUSIONS:  1.Left Main Coronary Artery: This artery is severely calcified.  2. The entire Left Main: 70% stenosis.  3. Left Anterior Descending  Artery: is significantly obstructed.  4. Proximal LAD Lesion: The percent stenosis is 100%.  5. Circumflex Coronary Artery: significantly obstructed.  6. Proximal CX Lesion: The percent stenosis is 95%.  7. Distal Cx Lesion: The percent stenosis is 75%.  8. Right Coronary Artery: significantly obstructed.  9. Proximal RCA Lesion: The percent stenosis is 100%.  10. Evaluate for high risk PCI.  11. LIMA to the Mid LAD: Percent stenosis is 0%.  12. SVG to the 1st Marginal: Percent stenosis is 99%.  13. SVG to the 2nd Marginal: Percent stenosis is 80%.  14. The Left Ventricular Ejection Fraction is 30%.    ____________________________________________________________________________________  CPT Codes:  Left Heart Cath Bypass Graft w ventriculography and coronary angio(C)-83043; Moderate Sedation Services initial 15 minutes patient >5 years-95044; Moderate Sedation Services 1st additional 15 minutes patient >5 years-84331    ICD 10 Codes:  I21.4-Non ST elevation (NSTEMI) myocardial infarction    Clint Oates MD  Performing Physician  Electronically signed by Clint Oates MD on 9/25/2023 at 3:06:51 PM      cc Report to: JOSE ALFREDO SIN    cc Report to: Clint Oates MD          *** Final ***     Cardiac Catheterization Lab Procedures [Sep 25 2023  3:06PM]        Consult Status:  Consult Order ID: [id number]     Problem/Assessment/Plan:    Impression 1: CAD; NSTEMI   Plan for Impression 1: History of coronary artery  disease status post CABG in 2008, presented to [facility] with chest pain, subsequently ruling in for NSTEMI with peak troponin of 10,650.  Underwent cardiac catheterization showing severe native and coronary bypass disease with a subtotal occlusion  of the saphenous finger through the first obtuse marginal, severe stenosis of the saphenous vein graft to the second obtuse marginal and patent LIMA to the LAD, patent saphenous vein graft to the RCA.  Findings reviewed with Dr. Boone and  "Dr. Oates:  -Risks of redo bypass surgery outweigh potential benefit, patient better suited for high risk PCI.  Brilinta initially suspended will be resumed.       Electronic Signatures:  Shane Yu (APRN-CNP)  (Signed 25-Sep-2023 16:12)   Authored: Service, History of Present Illness, Review  Family/Social History and ROS, Allergies, Objective, Assessment/Recommendations, Note Completion      Last Updated: 25-Sep-2023 16:12 by Shane Yu (APRN-CNP)    References:  1.  Data Referenced From \"Consult-Cardiology\" 24-Sep-2023 09:10   "

## 2024-03-23 ENCOUNTER — OFFICE VISIT (OUTPATIENT)
Dept: FAMILY MEDICINE CLINIC | Age: 69
End: 2024-03-23
Payer: MEDICARE

## 2024-03-23 VITALS
TEMPERATURE: 97 F | SYSTOLIC BLOOD PRESSURE: 134 MMHG | DIASTOLIC BLOOD PRESSURE: 78 MMHG | OXYGEN SATURATION: 93 % | WEIGHT: 214.6 LBS | HEIGHT: 67 IN | RESPIRATION RATE: 18 BRPM | HEART RATE: 68 BPM | BODY MASS INDEX: 33.68 KG/M2

## 2024-03-23 DIAGNOSIS — L50.9 URTICARIA: Primary | ICD-10-CM

## 2024-03-23 PROBLEM — Z78.9 NEVER SMOKED TOBACCO: Status: ACTIVE | Noted: 2023-10-12

## 2024-03-23 PROBLEM — Z98.61 CAD S/P PERCUTANEOUS CORONARY ANGIOPLASTY: Status: ACTIVE | Noted: 2023-10-04

## 2024-03-23 PROBLEM — G56.00 CARPAL TUNNEL SYNDROME: Status: ACTIVE | Noted: 2023-10-11

## 2024-03-23 PROBLEM — M19.019 PRIMARY LOCALIZED OSTEOARTHROSIS OF SHOULDER REGION: Status: ACTIVE | Noted: 2023-10-11

## 2024-03-23 PROBLEM — I25.10 CAD S/P PERCUTANEOUS CORONARY ANGIOPLASTY: Status: ACTIVE | Noted: 2023-10-04

## 2024-03-23 PROBLEM — E66.9 OBESITY: Status: ACTIVE | Noted: 2023-09-28

## 2024-03-23 PROBLEM — R25.2 CRAMPS OF LOWER EXTREMITY: Status: ACTIVE | Noted: 2024-03-23

## 2024-03-23 PROBLEM — I10 ESSENTIAL HYPERTENSION: Status: ACTIVE | Noted: 2018-06-22

## 2024-03-23 PROBLEM — R07.9 CHEST PAIN: Status: ACTIVE | Noted: 2023-09-26

## 2024-03-23 PROCEDURE — 1123F ACP DISCUSS/DSCN MKR DOCD: CPT

## 2024-03-23 PROCEDURE — 3075F SYST BP GE 130 - 139MM HG: CPT

## 2024-03-23 PROCEDURE — 99213 OFFICE O/P EST LOW 20 MIN: CPT

## 2024-03-23 PROCEDURE — 3078F DIAST BP <80 MM HG: CPT

## 2024-03-23 RX ORDER — LOSARTAN POTASSIUM 25 MG/1
TABLET ORAL
COMMUNITY
Start: 2023-09-28 | End: 2024-03-23

## 2024-03-23 RX ORDER — CETIRIZINE HYDROCHLORIDE 10 MG/1
10 TABLET ORAL DAILY
Qty: 10 TABLET | Refills: 0 | Status: SHIPPED | OUTPATIENT
Start: 2024-03-23

## 2024-03-23 RX ORDER — LOSARTAN POTASSIUM 25 MG/1
25 TABLET ORAL DAILY
COMMUNITY

## 2024-03-23 RX ORDER — FAMOTIDINE 20 MG/1
20 TABLET, FILM COATED ORAL 2 TIMES DAILY
Qty: 10 TABLET | Refills: 0 | Status: SHIPPED | OUTPATIENT
Start: 2024-03-23 | End: 2024-03-28

## 2024-03-23 RX ORDER — METHYLPREDNISOLONE 4 MG/1
TABLET ORAL
Qty: 1 KIT | Refills: 0 | Status: SHIPPED | OUTPATIENT
Start: 2024-03-23 | End: 2024-03-29

## 2024-03-23 RX ORDER — BENZOCAINE/MENTHOL 6 MG-10 MG
LOZENGE MUCOUS MEMBRANE
Qty: 1.5 G | Refills: 0 | Status: SHIPPED | OUTPATIENT
Start: 2024-03-23

## 2024-03-23 RX ORDER — ATORVASTATIN CALCIUM 80 MG/1
80 TABLET, FILM COATED ORAL DAILY
COMMUNITY
Start: 2023-09-27 | End: 2024-10-11

## 2024-03-23 RX ORDER — LOSARTAN POTASSIUM 25 MG/1
25 TABLET ORAL DAILY
COMMUNITY
Start: 2023-10-12 | End: 2024-03-23

## 2024-03-23 RX ORDER — ISOSORBIDE MONONITRATE 30 MG/1
30 TABLET, EXTENDED RELEASE ORAL DAILY
COMMUNITY
Start: 2023-09-27 | End: 2024-10-11

## 2024-03-23 ASSESSMENT — PATIENT HEALTH QUESTIONNAIRE - PHQ9
SUM OF ALL RESPONSES TO PHQ QUESTIONS 1-9: 0
2. FEELING DOWN, DEPRESSED OR HOPELESS: NOT AT ALL
1. LITTLE INTEREST OR PLEASURE IN DOING THINGS: NOT AT ALL
SUM OF ALL RESPONSES TO PHQ QUESTIONS 1-9: 0
SUM OF ALL RESPONSES TO PHQ9 QUESTIONS 1 & 2: 0

## 2024-03-23 ASSESSMENT — ENCOUNTER SYMPTOMS
FACIAL SWELLING: 0
COUGH: 0
WHEEZING: 0
SHORTNESS OF BREATH: 0
RHINORRHEA: 0
CHEST TIGHTNESS: 0

## 2024-03-23 NOTE — PROGRESS NOTES
with PCP.    Reviewed with the patient: current clinical status,medications, activities and diet.     Side effects, adverse effects of themedication prescribed today, as well as treatment plan/ rationale and result expectations have been discussed with the patient who expresses understanding and desires to proceed.    Close follow up to evaluate treatment results and for coordination of care.  I have reviewed the patient's medical history in detail and updated the computerized patient record.    MARI Abdul - NP

## 2024-03-23 NOTE — PATIENT INSTRUCTIONS
Take Pepcid (Famotidine) and cetirizine (Zyrtec) today along with cream. May wait to take steroid to see if symptoms improve with these medications. Begin taking medrol dose pack if rash worsens or does not improve in 24 hours.     GO TO ER IF SEVERELY WORSENING OR ANY TROUBLE WITH BREATHING.

## 2024-03-27 ENCOUNTER — OFFICE VISIT (OUTPATIENT)
Dept: INTERNAL MEDICINE | Age: 69
End: 2024-03-27
Payer: MEDICARE

## 2024-03-27 VITALS
WEIGHT: 219.4 LBS | BODY MASS INDEX: 33.25 KG/M2 | DIASTOLIC BLOOD PRESSURE: 74 MMHG | SYSTOLIC BLOOD PRESSURE: 134 MMHG | RESPIRATION RATE: 16 BRPM | OXYGEN SATURATION: 97 % | HEART RATE: 60 BPM | HEIGHT: 68 IN

## 2024-03-27 DIAGNOSIS — Z23 ENCOUNTER FOR IMMUNIZATION: ICD-10-CM

## 2024-03-27 DIAGNOSIS — Z13.1 SCREENING FOR DIABETES MELLITUS: ICD-10-CM

## 2024-03-27 DIAGNOSIS — Z00.00 INITIAL MEDICARE ANNUAL WELLNESS VISIT: Primary | ICD-10-CM

## 2024-03-27 DIAGNOSIS — G89.29 CHRONIC RIGHT SHOULDER PAIN: ICD-10-CM

## 2024-03-27 DIAGNOSIS — M25.511 CHRONIC RIGHT SHOULDER PAIN: ICD-10-CM

## 2024-03-27 PROBLEM — N17.9 ACUTE KIDNEY INJURY (HCC): Status: RESOLVED | Noted: 2018-10-03 | Resolved: 2024-03-27

## 2024-03-27 PROBLEM — Z78.9 NEVER SMOKED TOBACCO: Status: RESOLVED | Noted: 2023-10-12 | Resolved: 2024-03-27

## 2024-03-27 PROBLEM — M19.019 PRIMARY LOCALIZED OSTEOARTHROSIS OF SHOULDER REGION: Status: RESOLVED | Noted: 2023-10-11 | Resolved: 2024-03-27

## 2024-03-27 PROBLEM — I25.10 CAD S/P PERCUTANEOUS CORONARY ANGIOPLASTY: Status: RESOLVED | Noted: 2023-10-04 | Resolved: 2024-03-27

## 2024-03-27 PROBLEM — E66.9 OBESITY: Status: RESOLVED | Noted: 2023-09-28 | Resolved: 2024-03-27

## 2024-03-27 PROBLEM — Z98.61 CAD S/P PERCUTANEOUS CORONARY ANGIOPLASTY: Status: RESOLVED | Noted: 2023-10-04 | Resolved: 2024-03-27

## 2024-03-27 PROBLEM — R07.9 CHEST PAIN: Status: RESOLVED | Noted: 2023-09-26 | Resolved: 2024-03-27

## 2024-03-27 PROBLEM — R25.2 CRAMPS OF LOWER EXTREMITY: Status: RESOLVED | Noted: 2024-03-23 | Resolved: 2024-03-27

## 2024-03-27 PROBLEM — G56.00 CARPAL TUNNEL SYNDROME: Status: RESOLVED | Noted: 2023-10-11 | Resolved: 2024-03-27

## 2024-03-27 LAB — HBA1C MFR BLD: 5.7 %

## 2024-03-27 PROCEDURE — 83036 HEMOGLOBIN GLYCOSYLATED A1C: CPT | Performed by: FAMILY MEDICINE

## 2024-03-27 PROCEDURE — G0438 PPPS, INITIAL VISIT: HCPCS | Performed by: FAMILY MEDICINE

## 2024-03-27 PROCEDURE — 1123F ACP DISCUSS/DSCN MKR DOCD: CPT | Performed by: FAMILY MEDICINE

## 2024-03-27 PROCEDURE — 3075F SYST BP GE 130 - 139MM HG: CPT | Performed by: FAMILY MEDICINE

## 2024-03-27 PROCEDURE — 90677 PCV20 VACCINE IM: CPT | Performed by: FAMILY MEDICINE

## 2024-03-27 PROCEDURE — 3078F DIAST BP <80 MM HG: CPT | Performed by: FAMILY MEDICINE

## 2024-03-27 PROCEDURE — G0009 ADMIN PNEUMOCOCCAL VACCINE: HCPCS | Performed by: FAMILY MEDICINE

## 2024-03-27 PROCEDURE — 99213 OFFICE O/P EST LOW 20 MIN: CPT | Performed by: FAMILY MEDICINE

## 2024-03-27 SDOH — ECONOMIC STABILITY: FOOD INSECURITY: WITHIN THE PAST 12 MONTHS, YOU WORRIED THAT YOUR FOOD WOULD RUN OUT BEFORE YOU GOT MONEY TO BUY MORE.: NEVER TRUE

## 2024-03-27 SDOH — ECONOMIC STABILITY: INCOME INSECURITY: HOW HARD IS IT FOR YOU TO PAY FOR THE VERY BASICS LIKE FOOD, HOUSING, MEDICAL CARE, AND HEATING?: NOT HARD AT ALL

## 2024-03-27 SDOH — ECONOMIC STABILITY: FOOD INSECURITY: WITHIN THE PAST 12 MONTHS, THE FOOD YOU BOUGHT JUST DIDN'T LAST AND YOU DIDN'T HAVE MONEY TO GET MORE.: NEVER TRUE

## 2024-03-27 SDOH — ECONOMIC STABILITY: HOUSING INSECURITY
IN THE LAST 12 MONTHS, WAS THERE A TIME WHEN YOU DID NOT HAVE A STEADY PLACE TO SLEEP OR SLEPT IN A SHELTER (INCLUDING NOW)?: NO

## 2024-03-27 ASSESSMENT — PATIENT HEALTH QUESTIONNAIRE - PHQ9
SUM OF ALL RESPONSES TO PHQ QUESTIONS 1-9: 0
1. LITTLE INTEREST OR PLEASURE IN DOING THINGS: NOT AT ALL
2. FEELING DOWN, DEPRESSED OR HOPELESS: NOT AT ALL
SUM OF ALL RESPONSES TO PHQ QUESTIONS 1-9: 0
SUM OF ALL RESPONSES TO PHQ9 QUESTIONS 1 & 2: 0

## 2024-03-27 ASSESSMENT — ENCOUNTER SYMPTOMS
DIARRHEA: 0
WHEEZING: 0
CONSTIPATION: 0
SHORTNESS OF BREATH: 0
ABDOMINAL PAIN: 0
COUGH: 0
RHINORRHEA: 0
SORE THROAT: 0

## 2024-03-27 ASSESSMENT — LIFESTYLE VARIABLES
HOW MANY STANDARD DRINKS CONTAINING ALCOHOL DO YOU HAVE ON A TYPICAL DAY: PATIENT DOES NOT DRINK
HOW OFTEN DO YOU HAVE A DRINK CONTAINING ALCOHOL: NEVER

## 2024-03-27 NOTE — PROGRESS NOTES
After obtaining consent, and per orders of Dr. Love, injection of Prevnar 20 given in Left deltoid by Abbie Daugherty MA. Patient instructed to remain in clinic for 20 minutes afterwards, and to report any adverse reaction to me immediately.

## 2024-03-27 NOTE — PROGRESS NOTES
[place] [facility]  [address] [id number]  Dept: [phone]  Dept Fax: [phone]  Loc: [phone]     Chief Complaint  Chief Complaint   Patient presents with    New Patient     Here to establish. Previous patient of .     Medicare AWV    Rash     Was seen in urgent care on 3/23/24 for urticaria.     Shoulder Pain     Right shoulder pain X5 yrs. Would like a referral to [facility].        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   HENT:  Negative for congestion, rhinorrhea and sore throat.    Respiratory:  Negative for cough, shortness of breath and wheezing.    Gastrointestinal:  Negative for abdominal pain, constipation and diarrhea.   Endocrine: Negative for polydipsia and polyuria.   Genitourinary:  Negative for dysuria, frequency and urgency.   Musculoskeletal:  Positive for arthralgias.   Neurological:  Negative for syncope, light-headedness, numbness and headaches.   Psychiatric/Behavioral:  Negative for sleep disturbance. The patient is not nervous/anxious.        Past Medical History:   Diagnosis Date    Acute kidney injury (HCC) 10/03/2018    Broken leg     Left    Congenital heart disease     Coronary atherosclerosis 09/08/2015    Hearing loss     HTN (hypertension) 09/08/2015    Hyperlipidemia 09/08/2015

## 2024-03-27 NOTE — PATIENT INSTRUCTIONS
stop-smoking programs and medicines. These can increase your chances of quitting for good. Quitting is one of the most important things you can do to protect your heart. It is never too late to quit. Try to avoid secondhand smoke too.     Stay at a weight that's healthy for you. Talk to your doctor if you need help losing weight.     Try to get 7 to 9 hours of sleep each night.     Limit alcohol to 2 drinks a day for men and 1 drink a day for women. Too much alcohol can cause health problems.     Manage other health problems such as diabetes, high blood pressure, and high cholesterol. If you think you may have a problem with alcohol or drug use, talk to your doctor.   Medicines    Take your medicines exactly as prescribed. Call your doctor if you think you are having a problem with your medicine.     If your doctor recommends aspirin, take the amount directed each day. Make sure you take aspirin and not another kind of pain reliever, such as acetaminophen (Tylenol).   When should you call for help?   Call 911 if you have symptoms of a heart attack. These may include:    Chest pain or pressure, or a strange feeling in the chest.     Sweating.     Shortness of breath.     Pain, pressure, or a strange feeling in the back, neck, jaw, or upper belly or in one or both shoulders or arms.     Lightheadedness or sudden weakness.     A fast or irregular heartbeat.   After you call 911, the  may tell you to chew 1 adult-strength or 2 to 4 low-dose aspirin. Wait for an ambulance. Do not try to drive yourself.  Watch closely for changes in your health, and be sure to contact your doctor if you have any problems.  Where can you learn more?  Go to https://www.[employer].net/patientEd and enter F075 to learn more about \"A Healthy Heart: Care Instructions.\"  Current as of: June 24, 2023               Content Version: 14.0  © 3417-3985 [employer].   Care instructions adapted under license by [employer]. If you

## 2024-04-16 ENCOUNTER — OFFICE VISIT (OUTPATIENT)
Dept: CARDIOLOGY | Facility: CLINIC | Age: 69
End: 2024-04-16
Payer: MEDICARE

## 2024-04-16 VITALS
WEIGHT: 211 LBS | SYSTOLIC BLOOD PRESSURE: 134 MMHG | HEART RATE: 60 BPM | BODY MASS INDEX: 32.08 KG/M2 | DIASTOLIC BLOOD PRESSURE: 82 MMHG

## 2024-04-16 DIAGNOSIS — Z78.9 NEVER SMOKED TOBACCO: ICD-10-CM

## 2024-04-16 DIAGNOSIS — I25.10 CAD S/P PERCUTANEOUS CORONARY ANGIOPLASTY: ICD-10-CM

## 2024-04-16 DIAGNOSIS — I10 ESSENTIAL HYPERTENSION: ICD-10-CM

## 2024-04-16 DIAGNOSIS — Z95.1 S/P CORONARY ARTERY BYPASS GRAFT X 5: ICD-10-CM

## 2024-04-16 DIAGNOSIS — E66.9 OBESITY WITH BODY MASS INDEX 30 OR GREATER: ICD-10-CM

## 2024-04-16 DIAGNOSIS — Z98.61 CAD S/P PERCUTANEOUS CORONARY ANGIOPLASTY: ICD-10-CM

## 2024-04-16 DIAGNOSIS — E78.2 MIXED HYPERLIPIDEMIA: ICD-10-CM

## 2024-04-16 PROCEDURE — 3079F DIAST BP 80-89 MM HG: CPT | Performed by: INTERNAL MEDICINE

## 2024-04-16 PROCEDURE — 1159F MED LIST DOCD IN RCRD: CPT | Performed by: INTERNAL MEDICINE

## 2024-04-16 PROCEDURE — 99214 OFFICE O/P EST MOD 30 MIN: CPT | Performed by: INTERNAL MEDICINE

## 2024-04-16 PROCEDURE — 3075F SYST BP GE 130 - 139MM HG: CPT | Performed by: INTERNAL MEDICINE

## 2024-04-16 RX ORDER — CLOPIDOGREL BISULFATE 75 MG/1
TABLET ORAL
Qty: 37 TABLET | Refills: 11 | Status: SHIPPED | OUTPATIENT
Start: 2024-04-16

## 2024-04-16 NOTE — PROGRESS NOTES
Referred by Dr. Kendall ref. provider found provider found for   Chief Complaint   Patient presents with    Follow-up     6 month.  States when takes his Brilinta at night, he notices chest pressure, as he goes to bed.  If he rests before going to bed, he does not notice this.  States he is fatigued        History of Present Illness  Alireza Bellamy is a 68 y.o. year old male patient 6 months following his coronary angioplasty with stent [place].  Complain of side effects from Brilinta.  He thinks every time he takes Brilinta he feels some shortness of breath.  I discussed with the patient  Can change it to clopidogrel and he will get the loading dose followed by a daily dose.  He will call me back if his symptoms continue.  He will see me back as scheduled    Past Medical History  Past Medical History:   Diagnosis Date    Coronary artery disease     Hyperlipidemia     Hypertension        Social History  Social History     Tobacco Use    Smoking status: Never    Smokeless tobacco: Current     Types: Chew   Vaping Use    Vaping status: Never Used   Substance Use Topics    Alcohol use: Never    Drug use: Never       Family History     Family History   Problem Relation Name Age of Onset    Coronary artery disease Mother      Other (PTCA) Mother      Leukemia Father         Review of Systems  As per HPI, all other systems reviewed and negative.    Allergies:  No Known Allergies     Outpatient Medications:  Current Outpatient Medications   Medication Instructions    aspirin 81 mg, oral, Daily    atorvastatin (LIPITOR) 80 mg, oral, Daily    carvedilol (COREG) 12.5 mg, oral, 2 times daily with meals    coenzyme Q-10 100 mg, oral, Daily    isosorbide mononitrate ER (IMDUR) 30 mg, oral, Daily, Do not crush or chew.    losartan (COZAAR) 25 mg, oral, Daily    nitroglycerin (NITROSTAT) 0.4 mg, sublingual, Every 5 min PRN    ticagrelor (BRILINTA) 90 mg, oral, 2 times daily         Vitals:  Vitals:    04/16/24 0817   BP: 134/82    Pulse: 60       Physical Exam:  Physical Exam  Cardiovascular:      Rate and Rhythm: Normal rate.      Pulses: Normal pulses.      Heart sounds: Normal heart sounds.   Pulmonary:      Effort: Pulmonary effort is normal.   Neurological:      General: No focal deficit present.      Mental Status: He is alert.             Assessment/Plan   Problem List Items Addressed This Visit       CAD S/P percutaneous coronary angioplasty    Essential hypertension    Hyperlipidemia    Obesity with body mass index 30 or greater    S/P coronary artery bypass graft x 5    Never smoked tobacco           Deborah Oates MD [facility]  Interventional Cardiology   of [facility]     Thank you for allowing me to participate in the care of this patient. Please do not hesitate to contact me with any further questions or concerns.

## 2024-04-29 ENCOUNTER — HOSPITAL ENCOUNTER (OUTPATIENT)
Dept: RADIOLOGY | Facility: CLINIC | Age: 69
Discharge: HOME | End: 2024-04-29
Payer: MEDICARE

## 2024-04-29 ENCOUNTER — APPOINTMENT (OUTPATIENT)
Dept: ORTHOPEDIC SURGERY | Facility: CLINIC | Age: 69
End: 2024-04-29
Payer: MEDICARE

## 2024-04-29 DIAGNOSIS — M25.511 CHRONIC RIGHT SHOULDER PAIN: ICD-10-CM

## 2024-04-29 DIAGNOSIS — G89.29 CHRONIC RIGHT SHOULDER PAIN: ICD-10-CM

## 2024-05-08 ENCOUNTER — HOSPITAL ENCOUNTER (OUTPATIENT)
Dept: RADIOLOGY | Facility: HOSPITAL | Age: 69
Discharge: HOME | End: 2024-05-08
Payer: MEDICARE

## 2024-05-08 ENCOUNTER — OFFICE VISIT (OUTPATIENT)
Dept: ORTHOPEDIC SURGERY | Facility: CLINIC | Age: 69
End: 2024-05-08
Payer: MEDICARE

## 2024-05-08 DIAGNOSIS — M19.011 PRIMARY OSTEOARTHRITIS OF RIGHT SHOULDER: ICD-10-CM

## 2024-05-08 DIAGNOSIS — M25.511 RIGHT SHOULDER PAIN, UNSPECIFIED CHRONICITY: ICD-10-CM

## 2024-05-08 PROCEDURE — 99204 OFFICE O/P NEW MOD 45 MIN: CPT | Performed by: ORTHOPAEDIC SURGERY

## 2024-05-08 PROCEDURE — 73030 X-RAY EXAM OF SHOULDER: CPT | Mod: RT

## 2024-05-08 PROCEDURE — 1159F MED LIST DOCD IN RCRD: CPT | Performed by: ORTHOPAEDIC SURGERY

## 2024-05-08 PROCEDURE — 73030 X-RAY EXAM OF SHOULDER: CPT | Mod: RIGHT SIDE | Performed by: RADIOLOGY

## 2024-05-08 NOTE — PROGRESS NOTES
History: Alireza is here for his right shoulder.  He has a long history of right shoulder pain and dysfunction.  He has tried medications in the past.  He had a left reverse shoulder arthroplasty by Dr. Sahu and has done fairly well.  He is right-hand-dominant.    Past medical history: Multiple  Medications: Multiple  Allergies: No known drug allergies    Please refer to the intake H&P regarding the patient's review of systems, family history and social history as was done today    HEENT: Normal  Lungs: Clear to auscultation  Heart: RRR  Abdomen: Soft, nontender  Skin: clear  Extremity: He has limited abduction of the right shoulder to 70 degrees.  Audible crepitus with rotation.  He has decent strength with resisted maneuvers but again significant pain and crepitus.  Internal rotation is to the side.  Left shoulder does internally rotate to L5.  He can get the left arm fully overhead with mild scapular elevation.  Good strength on the left throughout.  Contralateral exam is normal for strength, motion, stability and neurovascular assessment.    Radiographs: AP lateral bleak views of the right shoulder show end-stage shoulder DJD with a large calcification in the anterior gutter.  Moderate posterior wear.    Assessment: End-stage right shoulder DJD, stable left reverse shoulder arthroplasty    Plan: We discussed multiple options for his right shoulder arthritis.  He feels he is rather proceed with a replacement as he has done fairly well on the left side.  He understands limitations with shoulder arthroplasty including rotation and overhead lifting.  We will see him back preoperatively and upon clearance.  He will need to be off his Plavix for 3 days prior to surgery.  He did his left shoulder as an outpatient would like to do the right shoulder in the same fashion.  He would be in a sling for the first month with some intermittent therapy.  He did online therapy only after his left shoulder surgery.  All  questions were answered today with the patient.    This note was generated with voice recognition software and may contain grammatical errors.

## 2024-05-17 PROBLEM — M25.511 PAIN IN RIGHT SHOULDER: Status: ACTIVE | Noted: 2024-05-16

## 2024-05-17 PROBLEM — M19.011 PRIMARY OSTEOARTHRITIS, RIGHT SHOULDER: Status: ACTIVE | Noted: 2024-05-16

## 2024-05-20 ENCOUNTER — OFFICE VISIT (OUTPATIENT)
Dept: FAMILY MEDICINE CLINIC | Age: 69
End: 2024-05-20
Payer: MEDICARE

## 2024-05-20 VITALS
OXYGEN SATURATION: 96 % | BODY MASS INDEX: 32.4 KG/M2 | HEIGHT: 68 IN | HEART RATE: 66 BPM | DIASTOLIC BLOOD PRESSURE: 86 MMHG | SYSTOLIC BLOOD PRESSURE: 138 MMHG | WEIGHT: 213.8 LBS

## 2024-05-20 DIAGNOSIS — Z01.818 PRE-OP EXAM: ICD-10-CM

## 2024-05-20 PROCEDURE — 1123F ACP DISCUSS/DSCN MKR DOCD: CPT | Performed by: FAMILY MEDICINE

## 2024-05-20 PROCEDURE — 3079F DIAST BP 80-89 MM HG: CPT | Performed by: FAMILY MEDICINE

## 2024-05-20 PROCEDURE — 3075F SYST BP GE 130 - 139MM HG: CPT | Performed by: FAMILY MEDICINE

## 2024-05-20 PROCEDURE — 99213 OFFICE O/P EST LOW 20 MIN: CPT | Performed by: FAMILY MEDICINE

## 2024-05-20 RX ORDER — CLOPIDOGREL BISULFATE 75 MG/1
75 TABLET ORAL DAILY
COMMUNITY
Start: 2024-04-16

## 2024-05-20 ASSESSMENT — ENCOUNTER SYMPTOMS
WHEEZING: 0
CONSTIPATION: 0
RHINORRHEA: 0
ABDOMINAL PAIN: 0
DIARRHEA: 0
COUGH: 0
SHORTNESS OF BREATH: 0
SORE THROAT: 0

## 2024-05-20 NOTE — H&P (VIEW-ONLY)
"[facility] PRIMARY [address] [address]  Dept: [phone]  Dept Fax: [phone]     Chief Complaint:  Chief Complaint   Patient presents with   • Pre-op Exam     Right shoulder replacement with Gilbert Gonzalez at [facility] on 06/13/24. Pt will have CT done 05/27. He was not given any paperwork of his pre-op requirements. He believes he will be going to the new [facility] to complete these requirements.   • Nicotine Dependence     Would like to discuss quitting snuf.       Vitals:    05/20/24 0956   BP: 138/86   Pulse: 66   SpO2: 96%   Weight: 97 kg (213 lb 12.8 oz)   Height: 1.715 m (5' 7.5\")       HPI:  68 y.o.male who presents for the following:      Preop exam: planning R shoulder replacement with Dr. Gonzalez 6/13/24; planning preadmission testing with ortho 5/31/24; sees [facility] with Dr. Oates with hx of CAD s/p stents; no CP/SOB    Tobacco use: uses chewing tobacco; not interested in quitting    -----------------------------------------------------------------------------    Assessment/Plan:  68 y.o. male here mainly for the following:  Preop exam  He is acceptable risk for surgery  He will get separate clearance from cardiology  He isn't ready to quit tobacco     Diagnosis Orders   1. Pre-op exam                 Robert Mercedes MD      -----------------------------------------------------------------------------      Objective    Physical Exam:  Physical Exam  Vitals reviewed.   Constitutional:       General: He is not in acute distress.     Appearance: He is well-developed.   HENT:      Head: Normocephalic and atraumatic.      Mouth/Throat:      Pharynx: No oropharyngeal exudate.   Neck:      Thyroid: No thyromegaly.   Cardiovascular:      Rate and Rhythm: Normal rate and regular rhythm.      Heart sounds: Normal heart sounds. No murmur heard.  Pulmonary:      Effort: Pulmonary effort is normal. No respiratory distress.      Breath sounds: Normal breath sounds. No " wheezing.   Abdominal:      General: There is no distension.      Palpations: Abdomen is soft.      Tenderness: There is no abdominal tenderness. There is no guarding or rebound.   Musculoskeletal:      Cervical back: Normal range of motion.   Lymphadenopathy:      Cervical: No cervical adenopathy.   Skin:     General: Skin is warm and dry.   Neurological:      Mental Status: He is alert and oriented to person, place, and time.   Psychiatric:         Behavior: Behavior normal.           Review of Systems   Constitutional:  Negative for chills and fever.   HENT:  Negative for congestion, rhinorrhea and sore throat.    Respiratory:  Negative for cough, shortness of breath and wheezing.    Gastrointestinal:  Negative for abdominal pain, constipation and diarrhea.   Endocrine: Negative for polydipsia and polyuria.   Genitourinary:  Negative for dysuria, frequency and urgency.   Neurological:  Negative for syncope, light-headedness, numbness and headaches.   Psychiatric/Behavioral:  Negative for sleep disturbance. The patient is not nervous/anxious.        Past Medical History:   Diagnosis Date   • Acute kidney injury (HCC) 10/03/2018   • Broken leg     Left   • Congenital heart disease    • Coronary atherosclerosis 09/08/2015   • Hearing loss    • HTN (hypertension) 09/08/2015   • Hyperlipidemia 09/08/2015   • Obesity 09/28/2023   • PONV (postoperative nausea and vomiting)      Past Surgical History:   Procedure Laterality Date   • CARDIAC SURGERY     • CYSTOSCOPY N/A 8/9/2019    FLEXIBLE CYSTOSCOPY AND RIGHT STENT REMOVAL performed by Alejandro Goff MD at [facility] [place]   • CYSTOSCOPY INSERTION / REMOVAL STENT / STONE Right 5/9/2019    CYSTOSCOPY RIGHT DOUBLE J STENT PLACEMENT performed by Alejandro Goff MD at [facility] [place]   • EYE SURGERY     • JOINT REPLACEMENT      left shoulder, both knees   • KNEE ARTHROPLASTY     • LITHOTRIPSY Right 5/15/2019    RIGHT ESWL performed by Alejandro Goff MD at [facility] [place]   • LITHOTRIPSY N/A  7/10/2019    HOLMIUM  LASER LITHOTRIPSY REMOVAL OF STENT performed by Alejandro Goff MD at [facility] OR   • LITHOTRIPSY N/A 7/31/2019    HOLMIUM  LASER LITHOTRIPSY / INSERTION J STENT RIGHT performed by Alejandro Goff MD at [facility] OR   • AR COLON CA SCRN NOT HI RSK IND N/A 10/17/2018    COLONOSCOPY performed by Ari Veronica MD at [facility] OR   • URETER SURGERY N/A 7/10/2019    FLEXIBLE URETEROSCOPY, CYSTOSCOPY RETROGRADE URETEROSCOPY, STONE EXTRACTION, AND STENT INSERTION performed by Alejandro Goff MD at [facility] OR   • URETER SURGERY Right 7/31/2019    RIGHT URETEROSCOPY performed by Alejandro Goff MD at [facility] OR     Social History     Socioeconomic History   • Marital status:      Spouse name: Not on file   • Number of children: 2   • Years of education: Not on file   • Highest education level: Not on file   Occupational History   • 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   Tobacco Use   • Smoking status: Never     Passive exposure: Current   • Smokeless tobacco: Current     Types: Chew   Vaping Use   • Vaping Use: Never used   Substance and Sexual Activity   • Alcohol use: No   • Drug use: No   • Sexual activity: Yes     Partners: Female   Other Topics Concern   • Not on file   Social History Narrative   • Not on file     Social Determinants of Health     Financial Resource Strain: Low Risk  (3/27/2024)    Overall Financial Resource Strain (CARDIA)    • Difficulty of Paying Living Expenses: Not hard at all   Food Insecurity: No Food Insecurity (3/27/2024)    Hunger Vital Sign    • Worried About Running Out of Food in the Last Year: Never true    • Ran Out of Food in the Last Year: Never true   Transportation Needs: Unknown (3/27/2024)    PRAPARE - Transportation    • Lack of Transportation (Medical): Not on file    • Lack of Transportation (Non-Medical): No   Physical Activity: Insufficiently Active (3/27/2024)    Exercise Vital Sign    • Days of Exercise per Week: 3 days    • Minutes of  Exercise per Session: 30 min   Stress: Not on file   Social Connections: Not on file   Intimate Partner Violence: Not on file   Housing Stability: Unknown (3/27/2024)    Housing Stability Vital Sign    • Unable to Pay for Housing in the Last Year: Not on file    • Number of Places Lived in the Last Year: Not on file    • Unstable Housing in the Last Year: No     Family History   Problem Relation Age of Onset   • Alzheimer's Disease Mother    • Cancer Father         leukemia       No Known Allergies  Current Outpatient Medications   Medication Sig Dispense Refill   • clopidogrel (PLAVIX) 75 MG tablet Take 1 tablet by mouth daily     • atorvastatin (LIPITOR) 80 MG tablet Take 1 tablet by mouth daily     • isosorbide mononitrate (IMDUR) 30 MG extended release tablet Take 1 tablet by mouth daily     • losartan (COZAAR) 25 MG tablet Take 1 tablet by mouth daily     • aspirin 81 MG tablet Take 1 tablet by mouth daily     • Coenzyme Q10 (CO Q 10 PO) Take by mouth     • carvedilol (COREG) 6.25 MG tablet Take 1 tablet by mouth 2 times daily     • nitroGLYCERIN (NITROSTAT) 0.4 MG SL tablet Place 1 tablet under the tongue every 5 minutes as needed for Chest pain up to max of 3 total doses. If no relief after 1 dose, call 911.     • hydrocortisone 1 % cream Apply topically 2 times daily. 1.5 g 0     No current facility-administered medications for this visit.

## 2024-05-20 NOTE — PROGRESS NOTES
Not on file   Social Connections: Not on file   Intimate Partner Violence: Not on file   Housing Stability: Unknown (3/27/2024)    Housing Stability Vital Sign     Unable to Pay for Housing in the Last Year: Not on file     Number of Places Lived in the Last Year: Not on file     Unstable Housing in the Last Year: No     Family History   Problem Relation Age of Onset    Alzheimer's Disease Mother     Cancer Father         leukemia       No Known Allergies  Current Outpatient Medications   Medication Sig Dispense Refill    clopidogrel (PLAVIX) 75 MG tablet Take 1 tablet by mouth daily      atorvastatin (LIPITOR) 80 MG tablet Take 1 tablet by mouth daily      isosorbide mononitrate (IMDUR) 30 MG extended release tablet Take 1 tablet by mouth daily      losartan (COZAAR) 25 MG tablet Take 1 tablet by mouth daily      aspirin 81 MG tablet Take 1 tablet by mouth daily      Coenzyme Q10 (CO Q 10 PO) Take by mouth      carvedilol (COREG) 6.25 MG tablet Take 1 tablet by mouth 2 times daily      nitroGLYCERIN (NITROSTAT) 0.4 MG SL tablet Place 1 tablet under the tongue every 5 minutes as needed for Chest pain up to max of 3 total doses. If no relief after 1 dose, call 911.      hydrocortisone 1 % cream Apply topically 2 times daily. 1.5 g 0     No current facility-administered medications for this visit.

## 2024-05-24 ENCOUNTER — TELEPHONE (OUTPATIENT)
Dept: CARDIOLOGY | Facility: CLINIC | Age: 69
End: 2024-05-24
Payer: MEDICARE

## 2024-05-24 NOTE — TELEPHONE ENCOUNTER
Received preop clearance form for patient pending Right total shoulder reversal procedure with Dr. MODESTA Gonzalez MD on 6/13/2024.     Patient last seen with Dr. Debroah Oates MD [facility] on 4/16/2024:  Alireza Bellamy is a 68 y.o. year old male patient 6 months following his coronary angioplasty with stent plantation.  Complain of side effects from Brilinta.  He thinks every time he takes Brilinta he feels some shortness of breath.  I discussed with the patient  Can change it to clopidogrel and he will get the loading dose followed by a daily dose.  He will call me back if his symptoms continue.  He will see me back as scheduled.     [place] PCI completed on 10/5/2023:   1. Circumflex Coronary Artery: significantly obstructed.   2. OM 1 CX Lesion: The percent stenosis is 80%.   3. OM 1 CX Lesion: Resolute Reggie 2.75x22, Resolute [place] 2.25x8 post-dilation: 0% residual stenosis. OM 1 CX: pre-procedure DANE flow was 3(complete perfusion) and post-procedure DANE flow was 3(complete perfusion).   4. SVG to the 1st Marginal: Percent stenosis is 80%.   5. SVG to the 1st Marginal Lesion: Percent stenosis is 90%.   6. SVG to the 1st Marginal Lesion: Resolute [place] 3.00x15: 0% residual stenosis.   7. SVG to the 1st Marginal Lesion: pre-procedure DANE flow was 3(complete perfusion) and post-procedure DANE flow was 3(complete perfusion).    Patient currently on Plavix and ASA therapy  Form placed for Dr. Deborah Oates MD [facility] review today in clinic.

## 2024-05-27 ENCOUNTER — APPOINTMENT (OUTPATIENT)
Dept: RADIOLOGY | Facility: HOSPITAL | Age: 69
End: 2024-05-27
Payer: MEDICARE

## 2024-05-28 DIAGNOSIS — Z01.818 PRE-OPERATIVE CLEARANCE: Primary | ICD-10-CM

## 2024-05-28 RX ORDER — CHLORHEXIDINE GLUCONATE ORAL RINSE 1.2 MG/ML
15 SOLUTION DENTAL AS NEEDED
Qty: 30 ML | Refills: 0 | Status: SHIPPED | OUTPATIENT
Start: 2024-05-28

## 2024-05-28 NOTE — TELEPHONE ENCOUNTER
Per Dr. Deborah Oates MD [facility], patient cleared for procedure as requested.   OK to hold Plavix x5 days and ASA x7 days preop.   Form completed and faxed with confirmation received. Placed to scanning.     Patient advised with verbal understanding. Denies questions.

## 2024-05-31 ENCOUNTER — HOSPITAL ENCOUNTER (OUTPATIENT)
Dept: RADIOLOGY | Facility: HOSPITAL | Age: 69
Discharge: HOME | End: 2024-05-31
Payer: MEDICARE

## 2024-05-31 ENCOUNTER — PRE-ADMISSION TESTING (OUTPATIENT)
Dept: PREADMISSION TESTING | Facility: HOSPITAL | Age: 69
End: 2024-05-31
Payer: MEDICARE

## 2024-05-31 ENCOUNTER — HOSPITAL ENCOUNTER (OUTPATIENT)
Dept: CARDIOLOGY | Facility: HOSPITAL | Age: 69
Discharge: HOME | End: 2024-05-31
Payer: MEDICARE

## 2024-05-31 VITALS
DIASTOLIC BLOOD PRESSURE: 67 MMHG | WEIGHT: 213.63 LBS | OXYGEN SATURATION: 98 % | RESPIRATION RATE: 16 BRPM | HEART RATE: 82 BPM | HEIGHT: 67 IN | SYSTOLIC BLOOD PRESSURE: 110 MMHG | BODY MASS INDEX: 33.53 KG/M2

## 2024-05-31 DIAGNOSIS — M19.011 PRIMARY OSTEOARTHRITIS OF RIGHT SHOULDER: ICD-10-CM

## 2024-05-31 DIAGNOSIS — M25.511 PAIN IN RIGHT SHOULDER: ICD-10-CM

## 2024-05-31 DIAGNOSIS — M19.011 PRIMARY OSTEOARTHRITIS, RIGHT SHOULDER: ICD-10-CM

## 2024-05-31 LAB
ALBUMIN SERPL BCP-MCNC: 4.1 G/DL (ref 3.4–5)
ALP SERPL-CCNC: 76 U/L (ref 33–136)
ALT SERPL W P-5'-P-CCNC: 14 U/L (ref 10–52)
ANION GAP SERPL CALC-SCNC: 11 MMOL/L (ref 10–20)
AST SERPL W P-5'-P-CCNC: 15 U/L (ref 9–39)
ATRIAL RATE: 59 BPM
BASOPHILS # BLD AUTO: 0.07 X10*3/UL (ref 0–0.1)
BASOPHILS NFR BLD AUTO: 0.7 %
BILIRUB SERPL-MCNC: 1.4 MG/DL (ref 0–1.2)
BUN SERPL-MCNC: 27 MG/DL (ref 6–23)
CALCIUM SERPL-MCNC: 9.3 MG/DL (ref 8.6–10.3)
CHLORIDE SERPL-SCNC: 106 MMOL/L (ref 98–107)
CO2 SERPL-SCNC: 26 MMOL/L (ref 21–32)
CREAT SERPL-MCNC: 1.6 MG/DL (ref 0.5–1.3)
EGFRCR SERPLBLD CKD-EPI 2021: 46 ML/MIN/1.73M*2
EOSINOPHIL # BLD AUTO: 0.43 X10*3/UL (ref 0–0.4)
EOSINOPHIL NFR BLD AUTO: 4.3 %
ERYTHROCYTE [DISTWIDTH] IN BLOOD BY AUTOMATED COUNT: 13.4 % (ref 11.5–14.5)
EST. AVERAGE GLUCOSE BLD GHB EST-MCNC: 123 MG/DL
GLUCOSE SERPL-MCNC: 112 MG/DL (ref 74–99)
HBA1C MFR BLD: 5.9 %
HCT VFR BLD AUTO: 37.9 % (ref 41–52)
HGB BLD-MCNC: 12.6 G/DL (ref 13.5–17.5)
IMM GRANULOCYTES # BLD AUTO: 0.03 X10*3/UL (ref 0–0.5)
IMM GRANULOCYTES NFR BLD AUTO: 0.3 % (ref 0–0.9)
INR PPP: 1.2 (ref 0.9–1.1)
LYMPHOCYTES # BLD AUTO: 1.38 X10*3/UL (ref 0.8–3)
LYMPHOCYTES NFR BLD AUTO: 13.8 %
MCH RBC QN AUTO: 31.3 PG (ref 26–34)
MCHC RBC AUTO-ENTMCNC: 33.2 G/DL (ref 32–36)
MCV RBC AUTO: 94 FL (ref 80–100)
MONOCYTES # BLD AUTO: 0.69 X10*3/UL (ref 0.05–0.8)
MONOCYTES NFR BLD AUTO: 6.9 %
NEUTROPHILS # BLD AUTO: 7.43 X10*3/UL (ref 1.6–5.5)
NEUTROPHILS NFR BLD AUTO: 74 %
NRBC BLD-RTO: 0 /100 WBCS (ref 0–0)
P AXIS: 23 DEGREES
P OFFSET: 167 MS
P ONSET: 111 MS
PLATELET # BLD AUTO: 258 X10*3/UL (ref 150–450)
POTASSIUM SERPL-SCNC: 4 MMOL/L (ref 3.5–5.3)
PR INTERVAL: 208 MS
PROT SERPL-MCNC: 7.5 G/DL (ref 6.4–8.2)
PROTHROMBIN TIME: 13.1 SECONDS (ref 9.8–12.8)
Q ONSET: 215 MS
QRS COUNT: 9 BEATS
QRS DURATION: 88 MS
QT INTERVAL: 390 MS
QTC CALCULATION(BAZETT): 386 MS
QTC FREDERICIA: 388 MS
R AXIS: -22 DEGREES
RBC # BLD AUTO: 4.03 X10*6/UL (ref 4.5–5.9)
SODIUM SERPL-SCNC: 139 MMOL/L (ref 136–145)
T AXIS: 81 DEGREES
T OFFSET: 410 MS
VENTRICULAR RATE: 59 BPM
WBC # BLD AUTO: 10 X10*3/UL (ref 4.4–11.3)

## 2024-05-31 PROCEDURE — 93010 ELECTROCARDIOGRAM REPORT: CPT | Performed by: INTERNAL MEDICINE

## 2024-05-31 PROCEDURE — 84075 ASSAY ALKALINE PHOSPHATASE: CPT

## 2024-05-31 PROCEDURE — 93005 ELECTROCARDIOGRAM TRACING: CPT

## 2024-05-31 PROCEDURE — 73200 CT UPPER EXTREMITY W/O DYE: CPT | Mod: RT

## 2024-05-31 PROCEDURE — 36415 COLL VENOUS BLD VENIPUNCTURE: CPT

## 2024-05-31 PROCEDURE — 87081 CULTURE SCREEN ONLY: CPT | Mod: ELYLAB

## 2024-05-31 PROCEDURE — 83036 HEMOGLOBIN GLYCOSYLATED A1C: CPT | Mod: ELYLAB

## 2024-05-31 PROCEDURE — 85025 COMPLETE CBC W/AUTO DIFF WBC: CPT

## 2024-05-31 PROCEDURE — 85610 PROTHROMBIN TIME: CPT

## 2024-05-31 RX ORDER — NAPROXEN SODIUM 220 MG
220 TABLET ORAL EVERY 12 HOURS PRN
COMMUNITY

## 2024-05-31 NOTE — PREPROCEDURE INSTRUCTIONS
Patient here for PAT visit; labs, EKG, and MRSA swab obtained. Hibiclens  and joint instruction book given to and reviewed with patient.  Mouth rinse instructions provided for pre-op use. Written pre-op instructions reviewed with patient. Patient aware to hold aspirin and Plavix as instructed; patient aware to take Coreg the morning of the procedure with a sip of water.

## 2024-06-02 LAB — STAPHYLOCOCCUS SPEC CULT: NORMAL

## 2024-06-05 NOTE — PROGRESS NOTES
"Physical Therapy    Physical Therapy Evaluation and Treatment      Patient Name: Alireza Bellamy  MRN: [id number]  Today's Date: 6/7/2024  Time Calculation  Start Time: 0845  Stop Time: 0915  Time Calculation (min): 30 min    Assessment:    PT Pre-Op Assessment    Patient demonstrates independent knowledge and understanding of: anatomy, surgical procedure, post-operative exercise programs, signs and symptoms of post-operative infection and post-operative management of pain.     Patient demonstrate ability to don/doff  shoulder sling     Handouts Given: surgical overview booklet, post-operative exercises    Plan of Care: Patient will be placed on hold for therapy until after completion of surgical procedure. Upon return to therapy, patient's status will be re-assessed and goal updated     Patient plans for post op follow up at [facility]. Pt will be placed on hold and will schedule PT once cleared to begin by surgeon.         Clinical Presentation: Stable     Level of Complexity: Low         Plan  Patient will be placed on hold until after completion of surgical procedure. Pt will begin PT once cleared by Dr. Gonzalez.       Current Problem:   1. Primary osteoarthritis, right shoulder        2. Primary localized osteoarthrosis of right shoulder region        3. Chronic right shoulder pain            Subjective      Pt reports he has been dealing with RT shoulder pain for a while pain.     Pt is RT hand dominant.     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 He thinks the shoulder \" wore out\" over time. Was told it is bone on bone.     Pt is scheduled for TSA-REV on 6/13/24 with Dr. Nicola Gonzalez       Pain:     Location: RT shoulder   Description: sharp and stabbing  Worst: 8-9/10, felt he could not move the arm   Best: 0/10 at rest when not moving  Current:   6/10  Aggravating Factors:   movement  Relieving Factors:  was previously taking aleve, stopped due to having surgery      Relevant Information (PMH & " Previous Tests/Imaging): scanned and reviewed in chart (TSA L, bilateral knee replacements)  Previous Interventions/Treatments: n/a    Red Flags: Do you have any of the following? NO  Fever/chills, unexplained weight changes, dizziness/fainting, unexplained change in bowel or bladder functions, unexplained malaise or muscle weakness, night pain/sweats, numbness or tingling    General:  General  Reason for Referral: pre-op TSA- REV  Referred By: Dr. Nicola Gonzalez  Precautions:  N/a     Pain:  Pain Assessment  Pain Assessment: 0-10  Pain Score: 5 - Moderate pain  Pain Type: Chronic pain  Pain Location: Shoulder  Pain Orientation: Right    Home Living:   Pt lives alone currently, he reports his son and ex-wife would be able to assist him post op  Prior Level of Function:   Pt independent with all ADL's currently     Objective     Active Shoulder flexion 130  Active Shoulder abduction 80    Shoulder Musculoskeletal Exam    Inspection    Right      Deformity: AC joint prominence and distal clavicle elevation    Palpation    Right      Crepitus: severe      Tenderness: none    Strength    Right      External rotation: 4/5.       Internal rotation: 4/5.       Abduction: 3/5.       Biceps: 4+/5.       Triceps: 4+/5.         Goals:  n/a, pre-op eval

## 2024-06-07 ENCOUNTER — EVALUATION (OUTPATIENT)
Dept: PHYSICAL THERAPY | Facility: CLINIC | Age: 69
End: 2024-06-07
Payer: MEDICARE

## 2024-06-07 ENCOUNTER — OFFICE VISIT (OUTPATIENT)
Dept: ORTHOPEDIC SURGERY | Facility: CLINIC | Age: 69
End: 2024-06-07
Payer: MEDICARE

## 2024-06-07 DIAGNOSIS — M19.011 PRIMARY LOCALIZED OSTEOARTHROSIS OF RIGHT SHOULDER REGION: ICD-10-CM

## 2024-06-07 DIAGNOSIS — G89.29 CHRONIC RIGHT SHOULDER PAIN: ICD-10-CM

## 2024-06-07 DIAGNOSIS — G89.18 POST-OPERATIVE PAIN: ICD-10-CM

## 2024-06-07 DIAGNOSIS — M19.011 PRIMARY OSTEOARTHRITIS OF RIGHT SHOULDER: Primary | ICD-10-CM

## 2024-06-07 DIAGNOSIS — M19.011 PRIMARY OSTEOARTHRITIS, RIGHT SHOULDER: Primary | ICD-10-CM

## 2024-06-07 DIAGNOSIS — M25.511 CHRONIC RIGHT SHOULDER PAIN: ICD-10-CM

## 2024-06-07 PROCEDURE — 99024 POSTOP FOLLOW-UP VISIT: CPT | Performed by: PHYSICIAN ASSISTANT

## 2024-06-07 PROCEDURE — 97161 PT EVAL LOW COMPLEX 20 MIN: CPT | Performed by: PHYSICAL THERAPIST

## 2024-06-07 PROCEDURE — 97535 SELF CARE MNGMENT TRAINING: CPT | Performed by: PHYSICAL THERAPIST

## 2024-06-07 RX ORDER — OXYCODONE AND ACETAMINOPHEN 5; 325 MG/1; MG/1
1 TABLET ORAL EVERY 4 HOURS PRN
Qty: 28 TABLET | Refills: 0 | Status: SHIPPED | OUTPATIENT
Start: 2024-06-07 | End: 2024-06-12

## 2024-06-07 RX ORDER — CELECOXIB 50 MG/1
200 CAPSULE ORAL ONCE
OUTPATIENT
Start: 2024-06-07 | End: 2024-06-07

## 2024-06-07 RX ORDER — TRANEXAMIC ACID 650 MG/1
1950 TABLET ORAL ONCE
OUTPATIENT
Start: 2024-06-07 | End: 2024-06-07

## 2024-06-07 RX ORDER — CEPHALEXIN 500 MG/1
500 CAPSULE ORAL EVERY 6 HOURS
Qty: 4 CAPSULE | Refills: 0 | Status: SHIPPED | OUTPATIENT
Start: 2024-06-07 | End: 2024-06-08

## 2024-06-07 RX ORDER — ACETAMINOPHEN 325 MG/1
975 TABLET ORAL ONCE
OUTPATIENT
Start: 2024-06-07 | End: 2024-06-07

## 2024-06-07 RX ORDER — CEFAZOLIN SODIUM 2 G/100ML
2 INJECTION, SOLUTION INTRAVENOUS ONCE
OUTPATIENT
Start: 2024-06-07 | End: 2024-06-07

## 2024-06-07 RX ORDER — GABAPENTIN 600 MG/1
600 TABLET ORAL ONCE
OUTPATIENT
Start: 2024-06-07 | End: 2024-06-07

## 2024-06-07 RX ORDER — SODIUM CHLORIDE, SODIUM LACTATE, POTASSIUM CHLORIDE, CALCIUM CHLORIDE 600; 310; 30; 20 MG/100ML; MG/100ML; MG/100ML; MG/100ML
50 INJECTION, SOLUTION INTRAVENOUS CONTINUOUS
OUTPATIENT
Start: 2024-06-07

## 2024-06-07 RX ORDER — CYCLOBENZAPRINE HCL 10 MG
10 TABLET ORAL NIGHTLY PRN
Qty: 20 TABLET | Refills: 0 | Status: SHIPPED | OUTPATIENT
Start: 2024-06-07 | End: 2024-06-27

## 2024-06-07 ASSESSMENT — ENCOUNTER SYMPTOMS
DEPRESSION: 0
LOSS OF SENSATION IN FEET: 0
OCCASIONAL FEELINGS OF UNSTEADINESS: 0

## 2024-06-07 ASSESSMENT — PAIN SCALES - GENERAL: PAINLEVEL_OUTOF10: 5 - MODERATE PAIN

## 2024-06-07 ASSESSMENT — PAIN - FUNCTIONAL ASSESSMENT: PAIN_FUNCTIONAL_ASSESSMENT: 0-10

## 2024-06-07 NOTE — PROGRESS NOTES
Alireza Bellamy is here today for a pre-op visit of his right shoulder.  He is scheduled for right reverse shoulder arthroplasty.    This is a pre-op visit to discuss the risks and benefits of surgery. The risks and benefits were fully explained to the patient. The patient wishes to proceed.     With any surgery, infection is a risk; usually 1-2%. It can become higher for individuals with diabetes, rheumatoid arthritis, previous surgery, individuals on oral steroids or immunosuppressants, or obese individuals. All of these issues were properly addressed and considered. Reassured all sterile techniques would be followed.  Severe infections may require removal of any prosthesis (if applicable).    The patient will be given preop antibiotics. It was also explained to the patient that there will be some blood loss during the procedure, but that blood transfusion is usually not necessary.  If applicable, it is also noted that a tourniquet may be applied to lower the amount of blood loss during the case.    Pulmonary embolism and blood clots were also discussed with the patient. These can have fatal complications. It is explained to the patient that for certain surgeries the use of KATELYN hose, foot pumps, incentive spirometers, quick mobility and the use of blood thinners/Aspirin is the standard preventative care.     It was explained that surgery is often used to decrease pain, provide stability, and improve strength but individuals will have varying results postoperatively. There can be variation in motion and a risk of stiffness. It is also stated that occasionally we will have to manipulate an extremity if pain and stiffness persist. We also discussed there are limitations with some motions after certain surgeries.     Fracture, though rare, may also occur intraoperatively. There is also the possibility of nerve or vascular injuries as well. There is potential for continued numbness or tingling. The benefits of  anesthesia were explained to the patient.     All of the patient's questions were answered.     Results/Imaging: Previous x-rays show end-stage shoulder DJD with a large calcification in the anterior gutter.  Moderate posterior wear.    Assessment: End-stage right shoulder DJD    Plan:   I have personally reviewed the OARRS report for this patient. This report is scanned into the electronic medical record. I have considered the risks of abuse, dependence, addiction, and diversion.      We will send over postoperative pain medication as well as postop antibiotics.  He would like to pursue the surgery as an outpatient.      He will follow up 2 weeks post op.    This note was generated with voice recognition software and may contain grammatical errors.

## 2024-06-12 ENCOUNTER — ANESTHESIA EVENT (OUTPATIENT)
Dept: OPERATING ROOM | Facility: HOSPITAL | Age: 69
End: 2024-06-12
Payer: MEDICARE

## 2024-06-13 ENCOUNTER — HOSPITAL ENCOUNTER (OUTPATIENT)
Facility: HOSPITAL | Age: 69
Discharge: HOME | End: 2024-06-14
Attending: ORTHOPAEDIC SURGERY | Admitting: ORTHOPAEDIC SURGERY
Payer: MEDICARE

## 2024-06-13 ENCOUNTER — APPOINTMENT (OUTPATIENT)
Dept: RADIOLOGY | Facility: HOSPITAL | Age: 69
End: 2024-06-13
Payer: MEDICARE

## 2024-06-13 ENCOUNTER — ANESTHESIA (OUTPATIENT)
Dept: OPERATING ROOM | Facility: HOSPITAL | Age: 69
End: 2024-06-13
Payer: MEDICARE

## 2024-06-13 DIAGNOSIS — M19.011 PRIMARY OSTEOARTHRITIS OF RIGHT SHOULDER: Primary | ICD-10-CM

## 2024-06-13 DIAGNOSIS — Z96.611 H/O TOTAL SHOULDER REPLACEMENT, RIGHT: ICD-10-CM

## 2024-06-13 DIAGNOSIS — M25.511 PAIN IN RIGHT SHOULDER: ICD-10-CM

## 2024-06-13 DIAGNOSIS — G89.18 POST-OPERATIVE PAIN: ICD-10-CM

## 2024-06-13 DIAGNOSIS — M19.011 PRIMARY OSTEOARTHRITIS, RIGHT SHOULDER: ICD-10-CM

## 2024-06-13 PROBLEM — D64.9 ANEMIA: Status: ACTIVE | Noted: 2024-06-13

## 2024-06-13 PROBLEM — E66.09 CLASS 1 OBESITY DUE TO EXCESS CALORIES WITH BODY MASS INDEX (BMI) OF 32.0 TO 32.9 IN ADULT: Status: ACTIVE | Noted: 2024-06-13

## 2024-06-13 PROBLEM — E66.811 CLASS 1 OBESITY DUE TO EXCESS CALORIES WITH BODY MASS INDEX (BMI) OF 32.0 TO 32.9 IN ADULT: Status: ACTIVE | Noted: 2024-06-13

## 2024-06-13 PROCEDURE — 23430 REPAIR BICEPS TENDON: CPT | Performed by: ORTHOPAEDIC SURGERY

## 2024-06-13 PROCEDURE — 2500000004 HC RX 250 GENERAL PHARMACY W/ HCPCS (ALT 636 FOR OP/ED): Performed by: ANESTHESIOLOGY

## 2024-06-13 PROCEDURE — 23140 REMOVAL OF BONE LESION: CPT | Performed by: ORTHOPAEDIC SURGERY

## 2024-06-13 PROCEDURE — 2500000001 HC RX 250 WO HCPCS SELF ADMINISTERED DRUGS (ALT 637 FOR MEDICARE OP): Performed by: REGISTERED NURSE

## 2024-06-13 PROCEDURE — C1776 JOINT DEVICE (IMPLANTABLE): HCPCS | Performed by: ORTHOPAEDIC SURGERY

## 2024-06-13 PROCEDURE — 0752T DGTZ GLS MCRSCP SLD LVL III: CPT | Mod: TC,ELYLAB | Performed by: ORTHOPAEDIC SURGERY

## 2024-06-13 PROCEDURE — 23472 RECONSTRUCT SHOULDER JOINT: CPT | Performed by: ORTHOPAEDIC SURGERY

## 2024-06-13 PROCEDURE — 2500000005 HC RX 250 GENERAL PHARMACY W/O HCPCS: Performed by: ORTHOPAEDIC SURGERY

## 2024-06-13 PROCEDURE — 2720000007 HC OR 272 NO HCPCS: Performed by: ORTHOPAEDIC SURGERY

## 2024-06-13 PROCEDURE — A4217 STERILE WATER/SALINE, 500 ML: HCPCS | Performed by: ORTHOPAEDIC SURGERY

## 2024-06-13 PROCEDURE — 3700000002 HC GENERAL ANESTHESIA TIME - EACH INCREMENTAL 1 MINUTE: Performed by: ORTHOPAEDIC SURGERY

## 2024-06-13 PROCEDURE — 93010 ELECTROCARDIOGRAM REPORT: CPT | Performed by: INTERNAL MEDICINE

## 2024-06-13 PROCEDURE — 2500000004 HC RX 250 GENERAL PHARMACY W/ HCPCS (ALT 636 FOR OP/ED): Performed by: PHYSICIAN ASSISTANT

## 2024-06-13 PROCEDURE — 7100000011 HC EXTENDED STAY RECOVERY HOURLY - NURSING UNIT

## 2024-06-13 PROCEDURE — 7100000001 HC RECOVERY ROOM TIME - INITIAL BASE CHARGE: Performed by: ORTHOPAEDIC SURGERY

## 2024-06-13 PROCEDURE — 7100000002 HC RECOVERY ROOM TIME - EACH INCREMENTAL 1 MINUTE: Performed by: ORTHOPAEDIC SURGERY

## 2024-06-13 PROCEDURE — 23472 RECONSTRUCT SHOULDER JOINT: CPT | Performed by: PHYSICIAN ASSISTANT

## 2024-06-13 PROCEDURE — 99222 1ST HOSP IP/OBS MODERATE 55: CPT | Performed by: REGISTERED NURSE

## 2024-06-13 PROCEDURE — 3700000001 HC GENERAL ANESTHESIA TIME - INITIAL BASE CHARGE: Performed by: ORTHOPAEDIC SURGERY

## 2024-06-13 PROCEDURE — 2500000004 HC RX 250 GENERAL PHARMACY W/ HCPCS (ALT 636 FOR OP/ED): Performed by: REGISTERED NURSE

## 2024-06-13 PROCEDURE — 3600000010 HC OR TIME - EACH INCREMENTAL 1 MINUTE - PROCEDURE LEVEL FIVE: Performed by: ORTHOPAEDIC SURGERY

## 2024-06-13 PROCEDURE — 73030 X-RAY EXAM OF SHOULDER: CPT | Mod: RIGHT SIDE | Performed by: RADIOLOGY

## 2024-06-13 PROCEDURE — 2500000002 HC RX 250 W HCPCS SELF ADMINISTERED DRUGS (ALT 637 FOR MEDICARE OP, ALT 636 FOR OP/ED): Performed by: ORTHOPAEDIC SURGERY

## 2024-06-13 PROCEDURE — 88304 TISSUE EXAM BY PATHOLOGIST: CPT | Performed by: PATHOLOGY

## 2024-06-13 PROCEDURE — 2500000005 HC RX 250 GENERAL PHARMACY W/O HCPCS: Performed by: ANESTHESIOLOGY

## 2024-06-13 PROCEDURE — 2500000004 HC RX 250 GENERAL PHARMACY W/ HCPCS (ALT 636 FOR OP/ED): Performed by: ORTHOPAEDIC SURGERY

## 2024-06-13 PROCEDURE — 3600000005 HC OR TIME - INITIAL BASE CHARGE - PROCEDURE LEVEL FIVE: Performed by: ORTHOPAEDIC SURGERY

## 2024-06-13 PROCEDURE — C1713 ANCHOR/SCREW BN/BN,TIS/BN: HCPCS | Performed by: ORTHOPAEDIC SURGERY

## 2024-06-13 PROCEDURE — 2500000002 HC RX 250 W HCPCS SELF ADMINISTERED DRUGS (ALT 637 FOR MEDICARE OP, ALT 636 FOR OP/ED): Performed by: PHYSICIAN ASSISTANT

## 2024-06-13 PROCEDURE — A6213 FOAM DRG >16<=48 SQ IN W/BDR: HCPCS | Performed by: ORTHOPAEDIC SURGERY

## 2024-06-13 PROCEDURE — 2780000003 HC OR 278 NO HCPCS: Performed by: ORTHOPAEDIC SURGERY

## 2024-06-13 PROCEDURE — 23430 REPAIR BICEPS TENDON: CPT | Performed by: PHYSICIAN ASSISTANT

## 2024-06-13 PROCEDURE — 2500000001 HC RX 250 WO HCPCS SELF ADMINISTERED DRUGS (ALT 637 FOR MEDICARE OP): Performed by: PHYSICIAN ASSISTANT

## 2024-06-13 PROCEDURE — 73030 X-RAY EXAM OF SHOULDER: CPT | Mod: RT

## 2024-06-13 PROCEDURE — 2500000005 HC RX 250 GENERAL PHARMACY W/O HCPCS: Performed by: PHYSICIAN ASSISTANT

## 2024-06-13 DEVICE — POLY LINER, +0MM, 40MM DIA, REVERSE SHOULDER SYSTEM: Type: IMPLANTABLE DEVICE | Site: SHOULDER | Status: FUNCTIONAL

## 2024-06-13 DEVICE — HUMERAL STEM SPACER, SIZE 9MM, REVERSE SHOULDER SYSTEM: Type: IMPLANTABLE DEVICE | Site: SHOULDER | Status: FUNCTIONAL

## 2024-06-13 DEVICE — IMPLANTABLE DEVICE: Type: IMPLANTABLE DEVICE | Site: SHOULDER | Status: FUNCTIONAL

## 2024-06-13 DEVICE — IMPLANTABLE DEVICE
Type: IMPLANTABLE DEVICE | Site: SHOULDER | Status: FUNCTIONAL
Brand: ANATOMICAL SHOULDER™

## 2024-06-13 DEVICE — BASEPLATE, TM RVS, 26 X 20MM: Type: IMPLANTABLE DEVICE | Site: SHOULDER | Status: FUNCTIONAL

## 2024-06-13 DEVICE — IMPLANTABLE DEVICE
Type: IMPLANTABLE DEVICE | Site: SHOULDER | Status: FUNCTIONAL
Brand: TRABECULAR METAL®

## 2024-06-13 RX ORDER — SODIUM CHLORIDE, SODIUM LACTATE, POTASSIUM CHLORIDE, CALCIUM CHLORIDE 600; 310; 30; 20 MG/100ML; MG/100ML; MG/100ML; MG/100ML
50 INJECTION, SOLUTION INTRAVENOUS CONTINUOUS
Status: DISCONTINUED | OUTPATIENT
Start: 2024-06-13 | End: 2024-06-14 | Stop reason: HOSPADM

## 2024-06-13 RX ORDER — CEFAZOLIN SODIUM 2 G/100ML
2 INJECTION, SOLUTION INTRAVENOUS EVERY 8 HOURS
Status: COMPLETED | OUTPATIENT
Start: 2024-06-13 | End: 2024-06-13

## 2024-06-13 RX ORDER — PROPOFOL 10 MG/ML
INJECTION, EMULSION INTRAVENOUS AS NEEDED
Status: DISCONTINUED | OUTPATIENT
Start: 2024-06-13 | End: 2024-06-13

## 2024-06-13 RX ORDER — SODIUM CHLORIDE, SODIUM LACTATE, POTASSIUM CHLORIDE, CALCIUM CHLORIDE 600; 310; 30; 20 MG/100ML; MG/100ML; MG/100ML; MG/100ML
100 INJECTION, SOLUTION INTRAVENOUS CONTINUOUS
Status: DISCONTINUED | OUTPATIENT
Start: 2024-06-13 | End: 2024-06-13 | Stop reason: HOSPADM

## 2024-06-13 RX ORDER — ROCURONIUM BROMIDE 10 MG/ML
INJECTION, SOLUTION INTRAVENOUS AS NEEDED
Status: DISCONTINUED | OUTPATIENT
Start: 2024-06-13 | End: 2024-06-13

## 2024-06-13 RX ORDER — NALOXONE HYDROCHLORIDE 1 MG/ML
0.2 INJECTION INTRAMUSCULAR; INTRAVENOUS; SUBCUTANEOUS EVERY 5 MIN PRN
Status: DISCONTINUED | OUTPATIENT
Start: 2024-06-13 | End: 2024-06-14 | Stop reason: HOSPADM

## 2024-06-13 RX ORDER — POLYETHYLENE GLYCOL 3350 17 G/17G
17 POWDER, FOR SOLUTION ORAL DAILY
Status: DISCONTINUED | OUTPATIENT
Start: 2024-06-13 | End: 2024-06-14 | Stop reason: HOSPADM

## 2024-06-13 RX ORDER — PHENYLEPHRINE HCL IN 0.9% NACL 0.4MG/10ML
SYRINGE (ML) INTRAVENOUS AS NEEDED
Status: DISCONTINUED | OUTPATIENT
Start: 2024-06-13 | End: 2024-06-13

## 2024-06-13 RX ORDER — TRANEXAMIC ACID 650 MG/1
1950 TABLET ORAL ONCE
Status: COMPLETED | OUTPATIENT
Start: 2024-06-13 | End: 2024-06-13

## 2024-06-13 RX ORDER — GABAPENTIN 300 MG/1
600 CAPSULE ORAL ONCE
Status: COMPLETED | OUTPATIENT
Start: 2024-06-13 | End: 2024-06-13

## 2024-06-13 RX ORDER — CEPHALEXIN 500 MG/1
500 CAPSULE ORAL EVERY 6 HOURS
Start: 2024-06-13

## 2024-06-13 RX ORDER — ONDANSETRON HYDROCHLORIDE 2 MG/ML
INJECTION, SOLUTION INTRAVENOUS AS NEEDED
Status: DISCONTINUED | OUTPATIENT
Start: 2024-06-13 | End: 2024-06-13

## 2024-06-13 RX ORDER — PHENYLEPHRINE 10 MG/250 ML(40 MCG/ML)IN 0.9 % SOD.CHLORIDE INTRAVENOUS
CONTINUOUS PRN
Status: DISCONTINUED | OUTPATIENT
Start: 2024-06-13 | End: 2024-06-13

## 2024-06-13 RX ORDER — ALUMINUM HYDROXIDE, MAGNESIUM HYDROXIDE, AND SIMETHICONE 1200; 120; 1200 MG/30ML; MG/30ML; MG/30ML
10 SUSPENSION ORAL ONCE
Status: COMPLETED | OUTPATIENT
Start: 2024-06-13 | End: 2024-06-13

## 2024-06-13 RX ORDER — OXYCODONE AND ACETAMINOPHEN 5; 325 MG/1; MG/1
1 TABLET ORAL EVERY 4 HOURS PRN
Start: 2024-06-13

## 2024-06-13 RX ORDER — OXYCODONE HYDROCHLORIDE 5 MG/1
5 TABLET ORAL EVERY 4 HOURS PRN
Status: DISCONTINUED | OUTPATIENT
Start: 2024-06-13 | End: 2024-06-14 | Stop reason: HOSPADM

## 2024-06-13 RX ORDER — MIDAZOLAM HYDROCHLORIDE 5 MG/ML
INJECTION INTRAMUSCULAR; INTRAVENOUS AS NEEDED
Status: DISCONTINUED | OUTPATIENT
Start: 2024-06-13 | End: 2024-06-13

## 2024-06-13 RX ORDER — OXYCODONE AND ACETAMINOPHEN 10; 325 MG/1; MG/1
1 TABLET ORAL EVERY 4 HOURS PRN
Status: DISCONTINUED | OUTPATIENT
Start: 2024-06-13 | End: 2024-06-14 | Stop reason: HOSPADM

## 2024-06-13 RX ORDER — CELECOXIB 200 MG/1
200 CAPSULE ORAL ONCE
Status: COMPLETED | OUTPATIENT
Start: 2024-06-13 | End: 2024-06-13

## 2024-06-13 RX ORDER — CYCLOBENZAPRINE HCL 10 MG
10 TABLET ORAL 3 TIMES DAILY PRN
Status: DISCONTINUED | OUTPATIENT
Start: 2024-06-13 | End: 2024-06-14 | Stop reason: HOSPADM

## 2024-06-13 RX ORDER — CALCIUM CARBONATE 200(500)MG
500 TABLET,CHEWABLE ORAL 4 TIMES DAILY PRN
Status: DISCONTINUED | OUTPATIENT
Start: 2024-06-13 | End: 2024-06-14 | Stop reason: HOSPADM

## 2024-06-13 RX ORDER — CARVEDILOL 12.5 MG/1
12.5 TABLET ORAL
Status: DISCONTINUED | OUTPATIENT
Start: 2024-06-13 | End: 2024-06-14 | Stop reason: HOSPADM

## 2024-06-13 RX ORDER — FENTANYL CITRATE 50 UG/ML
INJECTION, SOLUTION INTRAMUSCULAR; INTRAVENOUS CONTINUOUS PRN
Status: DISCONTINUED | OUTPATIENT
Start: 2024-06-13 | End: 2024-06-13

## 2024-06-13 RX ORDER — WATER 1 ML/ML
IRRIGANT IRRIGATION AS NEEDED
Status: DISCONTINUED | OUTPATIENT
Start: 2024-06-13 | End: 2024-06-13 | Stop reason: HOSPADM

## 2024-06-13 RX ORDER — MEPERIDINE HYDROCHLORIDE 25 MG/ML
12.5 INJECTION INTRAMUSCULAR; INTRAVENOUS; SUBCUTANEOUS EVERY 10 MIN PRN
Status: DISCONTINUED | OUTPATIENT
Start: 2024-06-13 | End: 2024-06-13 | Stop reason: HOSPADM

## 2024-06-13 RX ORDER — ISOSORBIDE MONONITRATE 30 MG/1
30 TABLET, EXTENDED RELEASE ORAL DAILY
Status: DISCONTINUED | OUTPATIENT
Start: 2024-06-14 | End: 2024-06-14 | Stop reason: HOSPADM

## 2024-06-13 RX ORDER — CEFAZOLIN SODIUM 2 G/100ML
2 INJECTION, SOLUTION INTRAVENOUS ONCE
Status: COMPLETED | OUTPATIENT
Start: 2024-06-13 | End: 2024-06-13

## 2024-06-13 RX ORDER — CLOPIDOGREL BISULFATE 75 MG/1
75 TABLET ORAL ONCE
Status: DISCONTINUED | OUTPATIENT
Start: 2024-06-13 | End: 2024-06-14

## 2024-06-13 RX ORDER — IBUPROFEN 200 MG
1 TABLET ORAL DAILY
Status: DISCONTINUED | OUTPATIENT
Start: 2024-06-13 | End: 2024-06-14 | Stop reason: HOSPADM

## 2024-06-13 RX ORDER — LOSARTAN POTASSIUM 25 MG/1
25 TABLET ORAL DAILY
Status: DISCONTINUED | OUTPATIENT
Start: 2024-06-13 | End: 2024-06-14 | Stop reason: HOSPADM

## 2024-06-13 RX ORDER — LIDOCAINE HYDROCHLORIDE 20 MG/ML
INJECTION, SOLUTION INFILTRATION; PERINEURAL AS NEEDED
Status: DISCONTINUED | OUTPATIENT
Start: 2024-06-13 | End: 2024-06-13

## 2024-06-13 RX ORDER — ACETAMINOPHEN 325 MG/1
650 TABLET ORAL EVERY 4 HOURS PRN
Status: DISCONTINUED | OUTPATIENT
Start: 2024-06-13 | End: 2024-06-14 | Stop reason: HOSPADM

## 2024-06-13 RX ORDER — TRANEXAMIC ACID 650 MG/1
1950 TABLET ORAL ONCE
Status: COMPLETED | OUTPATIENT
Start: 2024-06-14 | End: 2024-06-14

## 2024-06-13 RX ORDER — MORPHINE SULFATE 2 MG/ML
2 INJECTION, SOLUTION INTRAMUSCULAR; INTRAVENOUS EVERY 2 HOUR PRN
Status: DISCONTINUED | OUTPATIENT
Start: 2024-06-13 | End: 2024-06-14 | Stop reason: HOSPADM

## 2024-06-13 RX ORDER — POLYMYXIN B 500000 [USP'U]/1
INJECTION, POWDER, LYOPHILIZED, FOR SOLUTION INTRAMUSCULAR; INTRATHECAL; INTRAVENOUS; OPHTHALMIC AS NEEDED
Status: DISCONTINUED | OUTPATIENT
Start: 2024-06-13 | End: 2024-06-13 | Stop reason: HOSPADM

## 2024-06-13 RX ORDER — ACETAMINOPHEN 325 MG/1
975 TABLET ORAL ONCE
Status: COMPLETED | OUTPATIENT
Start: 2024-06-13 | End: 2024-06-13

## 2024-06-13 RX ORDER — SODIUM CHLORIDE 0.9 G/100ML
IRRIGANT IRRIGATION AS NEEDED
Status: DISCONTINUED | OUTPATIENT
Start: 2024-06-13 | End: 2024-06-13 | Stop reason: HOSPADM

## 2024-06-13 RX ORDER — FENTANYL CITRATE 50 UG/ML
50 INJECTION, SOLUTION INTRAMUSCULAR; INTRAVENOUS EVERY 5 MIN PRN
Status: DISCONTINUED | OUTPATIENT
Start: 2024-06-13 | End: 2024-06-13 | Stop reason: HOSPADM

## 2024-06-13 RX ORDER — OXYCODONE AND ACETAMINOPHEN 5; 325 MG/1; MG/1
1 TABLET ORAL EVERY 4 HOURS PRN
Status: DISCONTINUED | OUTPATIENT
Start: 2024-06-13 | End: 2024-06-14 | Stop reason: HOSPADM

## 2024-06-13 RX ORDER — ATORVASTATIN CALCIUM 80 MG/1
80 TABLET, FILM COATED ORAL NIGHTLY
Status: DISCONTINUED | OUTPATIENT
Start: 2024-06-13 | End: 2024-06-14 | Stop reason: HOSPADM

## 2024-06-13 RX ORDER — PANTOPRAZOLE SODIUM 40 MG/1
40 TABLET, DELAYED RELEASE ORAL
Status: DISCONTINUED | OUTPATIENT
Start: 2024-06-14 | End: 2024-06-14 | Stop reason: HOSPADM

## 2024-06-13 RX ORDER — DOCUSATE SODIUM 100 MG/1
100 CAPSULE, LIQUID FILLED ORAL 2 TIMES DAILY
Start: 2024-06-13 | End: 2024-06-23

## 2024-06-13 RX ORDER — ONDANSETRON HYDROCHLORIDE 2 MG/ML
4 INJECTION, SOLUTION INTRAVENOUS ONCE AS NEEDED
Status: DISCONTINUED | OUTPATIENT
Start: 2024-06-13 | End: 2024-06-13 | Stop reason: HOSPADM

## 2024-06-13 SDOH — SOCIAL STABILITY: SOCIAL INSECURITY: ABUSE: ADULT

## 2024-06-13 SDOH — SOCIAL STABILITY: SOCIAL INSECURITY: WERE YOU ABLE TO COMPLETE ALL THE BEHAVIORAL HEALTH SCREENINGS?: YES

## 2024-06-13 SDOH — SOCIAL STABILITY: SOCIAL INSECURITY: HAS ANYONE EVER THREATENED TO HURT YOUR FAMILY OR YOUR PETS?: NO

## 2024-06-13 SDOH — SOCIAL STABILITY: SOCIAL INSECURITY: DOES ANYONE TRY TO KEEP YOU FROM HAVING/CONTACTING OTHER FRIENDS OR DOING THINGS OUTSIDE YOUR HOME?: NO

## 2024-06-13 SDOH — SOCIAL STABILITY: SOCIAL INSECURITY: DO YOU FEEL ANYONE HAS EXPLOITED OR TAKEN ADVANTAGE OF YOU FINANCIALLY OR OF YOUR PERSONAL PROPERTY?: NO

## 2024-06-13 SDOH — SOCIAL STABILITY: SOCIAL INSECURITY: HAVE YOU HAD THOUGHTS OF HARMING ANYONE ELSE?: NO

## 2024-06-13 SDOH — SOCIAL STABILITY: SOCIAL INSECURITY: DO YOU FEEL UNSAFE GOING BACK TO THE PLACE WHERE YOU ARE LIVING?: NO

## 2024-06-13 SDOH — SOCIAL STABILITY: SOCIAL INSECURITY: ARE THERE ANY APPARENT SIGNS OF INJURIES/BEHAVIORS THAT COULD BE RELATED TO ABUSE/NEGLECT?: NO

## 2024-06-13 SDOH — HEALTH STABILITY: MENTAL HEALTH: CURRENT SMOKER: 0

## 2024-06-13 SDOH — SOCIAL STABILITY: SOCIAL INSECURITY: ARE YOU OR HAVE YOU BEEN THREATENED OR ABUSED PHYSICALLY, EMOTIONALLY, OR SEXUALLY BY ANYONE?: NO

## 2024-06-13 ASSESSMENT — LIFESTYLE VARIABLES
HOW OFTEN DO YOU HAVE A DRINK CONTAINING ALCOHOL: NEVER
AUDIT-C TOTAL SCORE: 0
HOW MANY STANDARD DRINKS CONTAINING ALCOHOL DO YOU HAVE ON A TYPICAL DAY: PATIENT DOES NOT DRINK
SKIP TO QUESTIONS 9-10: 1
AUDIT-C TOTAL SCORE: 0
HOW OFTEN DO YOU HAVE 6 OR MORE DRINKS ON ONE OCCASION: NEVER

## 2024-06-13 ASSESSMENT — COGNITIVE AND FUNCTIONAL STATUS - GENERAL
TURNING FROM BACK TO SIDE WHILE IN FLAT BAD: A LITTLE
PATIENT BASELINE BEDBOUND: NO
PERSONAL GROOMING: A LITTLE
DRESSING REGULAR LOWER BODY CLOTHING: A LOT
MOBILITY SCORE: 17
MOVING TO AND FROM BED TO CHAIR: A LITTLE
WALKING IN HOSPITAL ROOM: A LITTLE
CLIMB 3 TO 5 STEPS WITH RAILING: A LOT
TURNING FROM BACK TO SIDE WHILE IN FLAT BAD: A LITTLE
TOILETING: A LITTLE
MOVING FROM LYING ON BACK TO SITTING ON SIDE OF FLAT BED WITH BEDRAILS: A LITTLE
HELP NEEDED FOR BATHING: A LITTLE
DAILY ACTIVITIY SCORE: 18
DRESSING REGULAR UPPER BODY CLOTHING: A LOT
MOBILITY SCORE: 17
DRESSING REGULAR LOWER BODY CLOTHING: A LITTLE
MOVING TO AND FROM BED TO CHAIR: A LITTLE
CLIMB 3 TO 5 STEPS WITH RAILING: A LOT
MOVING FROM LYING ON BACK TO SITTING ON SIDE OF FLAT BED WITH BEDRAILS: A LITTLE
HELP NEEDED FOR BATHING: A LITTLE
DAILY ACTIVITIY SCORE: 16
EATING MEALS: A LITTLE
EATING MEALS: A LITTLE
TOILETING: A LITTLE
STANDING UP FROM CHAIR USING ARMS: A LITTLE
DRESSING REGULAR UPPER BODY CLOTHING: A LITTLE
WALKING IN HOSPITAL ROOM: A LITTLE
STANDING UP FROM CHAIR USING ARMS: A LITTLE
PERSONAL GROOMING: A LITTLE

## 2024-06-13 ASSESSMENT — ACTIVITIES OF DAILY LIVING (ADL)
GROOMING: INDEPENDENT
DRESSING YOURSELF: INDEPENDENT
ADEQUATE_TO_COMPLETE_ADL: YES
LACK_OF_TRANSPORTATION: NO
HEARING - LEFT EAR: HEARING AID
HEARING - RIGHT EAR: HEARING AID
PATIENT'S MEMORY ADEQUATE TO SAFELY COMPLETE DAILY ACTIVITIES?: YES
TOILETING: INDEPENDENT
BATHING: INDEPENDENT
WALKS IN HOME: INDEPENDENT
JUDGMENT_ADEQUATE_SAFELY_COMPLETE_DAILY_ACTIVITIES: YES
LACK_OF_TRANSPORTATION: NO
ASSISTIVE_DEVICE: EYEGLASSES;HEARING AID - RIGHT;HEARING AID - LEFT
FEEDING YOURSELF: INDEPENDENT

## 2024-06-13 ASSESSMENT — PATIENT HEALTH QUESTIONNAIRE - PHQ9
2. FEELING DOWN, DEPRESSED OR HOPELESS: NOT AT ALL
1. LITTLE INTEREST OR PLEASURE IN DOING THINGS: NOT AT ALL
SUM OF ALL RESPONSES TO PHQ9 QUESTIONS 1 & 2: 0

## 2024-06-13 ASSESSMENT — COLUMBIA-SUICIDE SEVERITY RATING SCALE - C-SSRS
2. HAVE YOU ACTUALLY HAD ANY THOUGHTS OF KILLING YOURSELF?: NO
1. IN THE PAST MONTH, HAVE YOU WISHED YOU WERE DEAD OR WISHED YOU COULD GO TO SLEEP AND NOT WAKE UP?: NO
6. HAVE YOU EVER DONE ANYTHING, STARTED TO DO ANYTHING, OR PREPARED TO DO ANYTHING TO END YOUR LIFE?: NO

## 2024-06-13 ASSESSMENT — PAIN SCALES - GENERAL
PAINLEVEL_OUTOF10: 0 - NO PAIN
PAIN_LEVEL: 0
PAINLEVEL_OUTOF10: 0 - NO PAIN
PAINLEVEL_OUTOF10: 0 - NO PAIN

## 2024-06-13 ASSESSMENT — PAIN - FUNCTIONAL ASSESSMENT
PAIN_FUNCTIONAL_ASSESSMENT: 0-10

## 2024-06-13 NOTE — PROGRESS NOTES
06/13/24 1823   Discharge Planning   Living Arrangements Alone   Support Systems Children   Assistance Needed none   Type of Residence Private residence   Number of Stairs to Enter Residence 3   Number of Stairs Within Residence 12   Do you have animals or pets at home? No   Home or Post Acute Services None   Patient expects to be discharged to: Home   Does the patient need discharge transport arranged? No   Financial Resource Strain   How hard is it for you to pay for the very basics like food, housing, medical care, and heating? Not hard   Housing Stability   In the last 12 months, was there a time when you were not able to pay the mortgage or rent on time? N   In the last 12 months, how many places have you lived? 1   In the last 12 months, was there a time when you did not have a steady place to sleep or slept in a shelter (including now)? N   Transportation Needs   In the past 12 months, has lack of transportation kept you from medical appointments or from getting medications? no   In the past 12 months, has lack of transportation kept you from meetings, work, or from getting things needed for daily living? No     Pt is s/p Elective Right reverse total shoulder arthroplasty, Right shoulder open rotator cuff repair, Right shoulder open biceps tenodesis and Right shoulder open AC joint cyst excision on 6/13/24 with Dr. Nicola Gonzalez. PT/OT al [facility] scores are currently pending. CT team will monitor case for progression and potential DC needs.

## 2024-06-13 NOTE — ANESTHESIA PROCEDURE NOTES
Airway  Date/Time: 6/13/2024 8:01 AM  Urgency: elective    Airway not difficult    Staffing  Performed: attending   Authorized by: John Ramachandran MD    Performed by: John Ramachandran MD  Patient location during procedure: OR    Indications and Patient Condition  Indications for airway management: anesthesia and airway protection  Spontaneous Ventilation: absent  Sedation level: deep  Preoxygenated: yes  Patient position: sniffing  MILS maintained throughout  Mask difficulty assessment: 1 - vent by mask  Planned trial extubation    Final Airway Details  Final airway type: endotracheal airway      Successful airway: ETT  Cuffed: yes   Successful intubation technique: direct laryngoscopy  Endotracheal tube insertion site: oral  Blade: Bermudez  Blade size: #2  ETT size (mm): 7.5  Cormack-Lehane Classification: grade I - full view of glottis  Placement verified by: chest auscultation, capnometry and palpation of cuff   Measured from: lips  ETT to lips (cm): 23  Number of attempts at approach: 1  Number of other approaches attempted: 0

## 2024-06-13 NOTE — CARE PLAN
Problem: Fall/Injury  Goal: Not fall by end of shift  Outcome: Progressing     Problem: Pain  Goal: Free from opioid side effects throughout the shift  Outcome: Progressing

## 2024-06-13 NOTE — ANESTHESIA PROCEDURE NOTES
Peripheral Block    Patient location during procedure: holding area  Start time: 6/13/2024 7:40 AM  End time: 6/13/2024 7:45 AM  Reason for block: at surgeon's request and post-op pain management  Staffing  Performed: attending   Authorized by: John Ramachandran MD    Performed by: John Ramachandran MD  Preanesthetic Checklist  Completed: patient identified, IV checked, site marked, risks and benefits discussed, surgical consent, monitors and equipment checked, pre-op evaluation and timeout performed   Timeout performed at: 6/13/2024 7:40 AM  Peripheral Block  Patient position: laying flat  Prep: ChloraPrep  Patient monitoring: heart rate, cardiac monitor and continuous pulse ox  Block type: interscalene  Laterality: right  Injection technique: single-shot  Guidance: ultrasound guided  Local infiltration: lidocaine  Infiltration strength: 1 %  Dose: 1 mL  Needle  Needle type: short-bevel   Needle gauge: 21 G  Needle length: 10 cm  Needle localization: ultrasound guidance     image stored in chart  Needle insertion depth: 2 cm  Assessment  Injection assessment: negative aspiration for heme, no paresthesia on injection, incremental injection and local visualized surrounding nerve on ultrasound  Paresthesia pain: none  Heart rate change: no  Slow fractionated injection: yes  Additional Notes  Risks, benefits, complications and alternatives discussed with patient.  Patient agrees to proceed with procedure.  Midazolam 5mg IV.  US guided, lateral IP approach.  Total of 20cc 0.5% ropivacaine with epi 1:200k and decadron 5mg injected in 3-5cc aliquots around plexus after negative aspirations.  No complications noted.

## 2024-06-13 NOTE — PROGRESS NOTES
Patient seen and evaluated upon arrival to floor from PACU s/p right reverse total shoulder arthroplasty. Doing well post operatively. Reports having no pain at this time given nerve block and numbness.     NWB to right upper extremity. Sling to be worn at all times with the exception of skin care and hygiene. No ROM to operative shoulder. Okay for ROM to elbow, hand, and wrist.     He plans to go home tomorrow with no needs.

## 2024-06-13 NOTE — CONSULTS
Consults    Reason For Consult  HTN     History Of Present Illness  Alireza Bellamy is a 69 y.o. male presents to the orthopedics team with increased pain and decreased ability to perform ADLS due to right shoulder DJD. After failed conservative treatment, patient underwent surgery with no immediate post op complications, reports minimal pain to site described as dull in nature, mild in severity, responding well to pain meds. No other complaints. Hospitalist services were consulted for management of the patient's medical conditions post/op.  Patient examined and seen, resting comfortably. Denies chest pain, shortness of breath, abdominal pain, dizziness, fever or chills. Currently patient vital signs are stable. Plan of care was discussed with patient, verbalized understanding through teach back method. Hospitalist team will continue to follow until discharge.    Review of systems: 10 system were reviewed and were negative except what was mentioned in history of present illness       Past Medical History  He has a past medical history of Arthritis, Coronary artery disease, Hearing aid worn, Heart disease, HL (hearing loss), Hyperlipidemia, Hypertension, Joint pain, Nephrolithiasis, PONV (postoperative nausea and vomiting), Vertigo, and Wears glasses.  S/p PCI    Surgical History  He has a past surgical history that includes Cardiac catheterization (09/27/2023); Coronary artery bypass graft (2008); Coronary angioplasty (09/27/2023); Cardiac catheterization (N/A, 10/05/2023); Cardiac catheterization (N/A, 10/05/2023); Coronary stent placement; Sinus surgery; Colonoscopy; Total shoulder arthroplasty (Left); Joint replacement (Bilateral); Carpal tunnel release; Ankle fracture surgery (Left); Leg Surgery (Left); Lithotripsy; Cystoscopy; and Total knee arthroplasty (Bilateral).     Social History  He reports that he has never smoked. His smokeless tobacco use includes chew. He reports that he does not drink alcohol and  does not use drugs.    Family History  Family History   Problem Relation Name Age of Onset   • Coronary artery disease Mother     • Other (PTCA) Mother     • Leukemia Father          Allergies  Patient has no known allergies.       Physical Exam  Constitutional: Well developed, awake/alert/oriented x3, cooperative  Eyes: PERRL,  clear sclera  ENMT: mucous membranes moist, no apparent injury, no lesions seen  Head/Neck: Neck supple, no apparent injury,  Respiratory/Thorax: Patent airways,  normal breath sounds with good chest expansion, thorax symmetric  Cardiovascular: Regular, rate and rhythm, no murmurs, 2+ equal pulses of the extremities, normal S 1and S 2  Gastrointestinal: Nondistended, soft, non-tender,   Musculoskeletal: mild decrease range of motion to right shoulder  s/p sx intervention, good capillary refills bilaterally, +2 pulses,    Extremities: normal extremities,  incision covered with Aquacel, CDI   Skin: warm, dry, intact  Neurological: alert/oriented x 3, speech clear  Psychiatric: appropriate mood and behavior         Last Recorded Vitals  BP 96/53 (BP Location: Left arm, Patient Position: Lying)   Pulse 61   Temp 36.3 °C (97.3 °F) (Temporal)   Resp 15   Wt 94.3 kg (207 lb 14.3 oz)   SpO2 93%     Relevant Results  Scheduled medications  atorvastatin, 80 mg, oral, Nightly  carvedilol, 12.5 mg, oral, BID  ceFAZolin, 2 g, intravenous, q8h  [Held by provider] clopidogrel, 75 mg, oral, Once  [START ON 6/14/2024] isosorbide mononitrate ER, 30 mg, oral, Daily  [Held by provider] losartan, 25 mg, oral, Daily  polyethylene glycol, 17 g, oral, Daily  tranexamic acid, 1,950 mg, oral, Once  [START ON 6/14/2024] tranexamic acid, 1,950 mg, oral, Once      Continuous medications  lactated Ringer's, 50 mL/hr, Last Rate: 50 mL/hr (06/13/24 0751)  lactated Ringer's, 50 mL/hr, Last Rate: 50 mL/hr (06/13/24 1443)      PRN medications  PRN medications: acetaminophen, cyclobenzaprine, HYDROmorphone, HYDROmorphone,  morphine, naloxone, naloxone, naloxone, naloxone, oxyCODONE, oxyCODONE-acetaminophen, oxyCODONE-acetaminophen, promethazine (Phenergan) 6.25 mg in sodium chloride 0.9% 50 mL IV    No results found for this or any previous visit (from the past 24 hour(s)).       Assessment/Plan     # Right Shoulder Arthoplasty   Right Shoulder Pain  Admit to Orthopedics Team   Hospitalist team Consulted   DVTp and Pain management per Ortho   PT/OT evaluation  and treatment   CBC/BMP as appropriate, review results  Pulmonary Toileting   Follow up as needed outpatient with Orthopedics     # HTN / HLD / CAD  Telemetry for arrhythmia monitoring    Continue Statin   Continue Coreg and Imdur with parameters  Hemodynamically stable  Plavix to be restarted per Primary     #CKD   Baseline 1.60  Avoid nephrotoxic agents  Trend     #Nicotine Dependence  Smoking cessation encouraged and discussed  Nicotine patch if needed  Outpatient education to be provided  at discharge      Thank you for consult  Medicine to continue to follow   Call for acute needs.    Time spent  56  minutes obtaining labs, imaging, recommendations, interview, assessment, examination, medication review/ordering, and EMR review.    Plan of care was discussed extensively with patient, RN and Ortho NP. Patient verbalized understanding through teach back method. All questions and concerns addressed upon examination.     Of note, this documentation is completed using the Dragon Dictation system (voice recognition software). There may be spelling and/or grammatical errors that were not corrected prior to final submission.      Ani Barahona, APRN-CNP

## 2024-06-13 NOTE — ANESTHESIA PREPROCEDURE EVALUATION
Patient: Alireza Bellamy    Procedure Information       Date/Time: 06/13/24 0800    Procedure: TOTAL SHOULDER REPLACEMENT - REVERSE (Right: Shoulder) - BACITRACIN/POLYMYXIN IRRIGATION, 23HR OBS/BEDDED OP, THOR TM REVERSE, BEACH CHAIR, INTERSCALENE BLOCK    Location: [facility] / [facility]    Surgeons: Nicola Gonzalez MD            Relevant Problems   Cardiac   (+) CAD S/P percutaneous coronary angioplasty   (+) Chest pain   (+) Essential hypertension   (+) Hyperlipidemia      Neuro   (+) Carpal tunnel syndrome      Endocrine   (+) Class 1 obesity due to excess calories with body mass index (BMI) of 32.0 to 32.9 in adult      Hematology   (+) Anemia      Musculoskeletal   (+) Carpal tunnel syndrome   (+) Primary localized osteoarthrosis of shoulder region   (+) Primary osteoarthritis, right shoulder       Clinical information reviewed:   Tobacco  Allergies  Meds   Med Hx  Surg Hx   Fam Hx  Soc Hx        NPO Detail:  NPO/Void Status  Date of Last Liquid: 06/13/24  Time of Last Liquid: 0400  Date of Last Solid: 06/12/24  Time of Last Solid: 1930         Physical Exam    Airway  Mallampati: II  TM distance: >3 FB  Neck ROM: full     Cardiovascular    Dental - normal exam     Pulmonary    Abdominal            Anesthesia Plan    History of general anesthesia?: yes  History of complications of general anesthesia?: yes    ASA 3     general   (Interscalene nerve block)  The patient is not a current smoker.    intravenous induction

## 2024-06-13 NOTE — OP NOTE
TOTAL SHOULDER REPLACEMENT - REVERSE (R) Operative Note    Preop diagnosis: Right shoulder rotator cuff arthropathy, AC joint cyst    Postop diagnosis: Same    Procedure:   Right reverse total shoulder arthroplasty using a Pablo size 16 trabecular metal reverse compatible ingrowth humeral stem, trabecular metal reverse plus baseplate with 20 mm post, 40 mm +5 lateral offset eccentric glenosphere with 9 mm humeral spacer and 0 mm polyethylene liner  Right shoulder open rotator cuff repair  Right shoulder open biceps tenodesis  Right shoulder open AC joint cyst excision    Surgeon: Nicola Gonzalez MD  Assistant: Anahy Torrez PA-C      Findings: see procedure details    EBL: minimal    Procedure Details:   Indications: The patient has a history of right shoulder DJD and rotator cuff dysfunction.  He also developed a large AC joint cyst.  The patient had failed multiple conservative measures like to proceed with reverse shoulder arthroplasty.  Risks and benefits of shoulder replacement were noted with the patient and the family.  These include infection, stiffness, nerve damage, continued pain as well as need for subsequent operations.  Informed consent was obtained prior to arrival in the operating room.    Procedure: Upon arrival the patient was identified.  They were transported from a stretcher to the operating table and placed in the supine position.  A general endotracheal anesthetic was induced by the anesthesia staff.  Prior to start of the case the patient had been receiving intravenous vancomycin as well as Ancef.  The patient was positioned on the [facility] positioner with all bony prominences adequately padded.  The right shoulder and arm were prepped and draped in usual sterile fashion.  A standard deltopectoral approach to the right shoulder was utilized.  The incision was carried through the subcutaneous tissue hemostasis was obtained.  Subcutaneous dissection was carried out to the AC joint where a  large cyst was encountered.  Necrotic material was debrided from the cyst and the cyst capsule was excised and sent to pathology for further analysis.  The deltopectoral interval was opened and the capsule was identified.  An incision was made just medial to the bicipital groove with flaps tagged in the subscapularis and capsule for later repair.  The head was everted and large osteophytes were debrided.  A standard neck cut was made using the appropriate guides.  Canal reaming began until appropriate tightness was obtained.  The metaphyseal and conical reamers were then placed.  The Pablo humeral trial was noted to fit appropriately in approximately 20° of retroversion.  Attention was then directed to the glenoid.  Circumferential releases were performed around the glenoid to allow for adequate exposure.  Once exposed the guide for the center pin was placed.  The central drill was then placed followed by reaming to a nearly flat surface with slight correction of the posterior wear.  The Pablo trabecular metal baseplate with a 20 mm stem was then inserted with good purchase noted.  One superior and one inferior screw placed with excellent purchase noted.  Locking caps were then assembled.  The glenosphere was then inserted and tapped into position.  Multiple trials were placed on the humeral side until appropriate tightness was obtained.  The Pablo trabecular metal reverse compatible humeral stem was inserted with excellent purchase noted.  The humeral stem tight but was left slightly elevated due to potential looseness during trialing.  The final liner and spacer was placed and the shoulder was reduced.  Good stability was noted through full range of motion.  The wound is irrigated with antibiotic laden sterile saline.  The subscapularis and capsule as well as rotator cuff remnant were repaired using 0 Vicryl suture.  The biceps was noted to be scarred into the groove on approach and was tenodesed to the  anterior capsule.   The deltopectoral interval was tagged with a 2-0 Ethibond suture.  Subcutaneous tissue was closed with 2-0 Vicryl and skin edges approximated with 4-0 Monocryl.  All sponge and needle counts are correct prior to closure.  A sterile dressing was applied.  Patient was placed into a sling awoken from the anesthetic.  They were taken to the recovery room in stable condition.    Anahy Torrez PA-C was present throughout the entire case. Given the nature of the disease process and the procedure, a skilled surgical first assistant was necessary during the case. The assistant was necessary to hold retractors and to manipulate the extremity during the procedure. A certified scrub tech was at the back table managing the instruments and supplies for the surgical case.    Complications:  None; patient tolerated the procedure well.    Disposition: PACU - hemodynamically stable.  Condition: stable         Nicola Gonzalez  Phone Number: [phone]

## 2024-06-13 NOTE — DISCHARGE INSTRUCTIONS
Reverse Total Shoulder Replacement  Discharge Instructions    Surgical Site Care:  Change dressing once a day and PRN (as needed). Apply 4 x 4 sponge and light tape. If glue present, leave open to air.  You may leave wound open to air after initial dressing removal, if wound is clean, dry and intact  If Aquacel Ag dressing is present, do not remove dressing for 7 days, unless heavily saturated. If heavily saturated, remove dressing and start using instructions above  Staples will be removed on post-operative day 14 and steri-strips applied  Showering is permitted starting POD1 if waterproof Aquacel dressing is present or when the incision is covered with 4 x 4 and Tegaderm waterproof dressing  Until all areas of incision are healed.    Physical Therapy:  Weight Bearing Status:  NWB ( none weight bearing)  No ROM of Shoulder  Active and passive range of motion of elbow, wrist, hand  Per Physical Therapy handout  Sling to be applied at all times when out of bed. Ok to remove sling for hygiene and when awake in bed with support    Pain Medications  You were given oxycodone/acetaminophen (Percocet)  Wean off pain medications as you deem appropriate as long as pain is under control  Do not exceed 4,000 mg of acetaminophen from all sources in a 24 hour period  Contact the [facility] if you notice any reactions to the medication or need a refill    Cold packs/Ice packs/Machine  May be used 5 times daily for 15-30 minutes as necessary  Be sure to have a barrier (cloth, clothing, towel) between the site and the ice pack to prevent frostbite    Contact the [facility] office if  Increased redness, swelling, drainage of any kind, and/or pain to surgery site.  As well as new onset fevers and or chills.  These could signify an infection.  Arm tenderness to touch as well as increased swelling or redness.  This could signify a clot formation.  Numbness or tingling to an area around the incision site or  below the incision site (fingers).  Any rash appears, increased  or new onset nausea/vomiting occur.  This may indicate a reaction to a medication.  Phone # [phone].  Follow up with Surgeon in 2 weeks

## 2024-06-13 NOTE — ANESTHESIA POSTPROCEDURE EVALUATION
Patient: Alireza Bellamy    Procedure Summary       Date: 06/13/24 Room / Location: [facility]    Anesthesia Start: 0751 Anesthesia Stop: 0944    Procedure: TOTAL SHOULDER REPLACEMENT - REVERSE (Right: Shoulder) Diagnosis:       Pain in right shoulder      Primary osteoarthritis, right shoulder      (Pain in right shoulder [M25.511])      (Primary osteoarthritis, right shoulder [M19.011])    Surgeons: Nicola Gonzalez MD Responsible Provider: John Ramachandran MD    Anesthesia Type: general ASA Status: 3            Anesthesia Type: general    Vitals Value Taken Time   BP 94/49 06/13/24 0944   Temp 36.2 06/13/24 0944   Pulse 62 06/13/24 0944   Resp 18 06/13/24 0944   SpO2 95% 06/13/24 0944       Anesthesia Post Evaluation    Patient location during evaluation: bedside  Patient participation: complete - patient participated  Level of consciousness: awake and alert  Pain score: 0  Pain management: adequate  Multimodal analgesia pain management approach  Airway patency: patent  Cardiovascular status: acceptable  Respiratory status: acceptable and face mask  Hydration status: acceptable  Postoperative Nausea and Vomiting: none        There were no known notable events for this encounter.

## 2024-06-14 ENCOUNTER — APPOINTMENT (OUTPATIENT)
Dept: CARDIOLOGY | Facility: HOSPITAL | Age: 69
End: 2024-06-14
Payer: MEDICARE

## 2024-06-14 VITALS
DIASTOLIC BLOOD PRESSURE: 74 MMHG | OXYGEN SATURATION: 94 % | BODY MASS INDEX: 31.51 KG/M2 | SYSTOLIC BLOOD PRESSURE: 140 MMHG | RESPIRATION RATE: 18 BRPM | TEMPERATURE: 98.6 F | HEART RATE: 71 BPM | HEIGHT: 68 IN | WEIGHT: 207.89 LBS

## 2024-06-14 LAB
ANION GAP SERPL CALC-SCNC: 14 MMOL/L (ref 10–20)
ATRIAL RATE: 75 BPM
BUN SERPL-MCNC: 30 MG/DL (ref 6–23)
CALCIUM SERPL-MCNC: 9.1 MG/DL (ref 8.6–10.3)
CHLORIDE SERPL-SCNC: 103 MMOL/L (ref 98–107)
CO2 SERPL-SCNC: 23 MMOL/L (ref 21–32)
CREAT SERPL-MCNC: 1.69 MG/DL (ref 0.5–1.3)
EGFRCR SERPLBLD CKD-EPI 2021: 43 ML/MIN/1.73M*2
ERYTHROCYTE [DISTWIDTH] IN BLOOD BY AUTOMATED COUNT: 13.2 % (ref 11.5–14.5)
GLUCOSE SERPL-MCNC: 140 MG/DL (ref 74–99)
HCT VFR BLD AUTO: 36.2 % (ref 41–52)
HGB BLD-MCNC: 12.1 G/DL (ref 13.5–17.5)
HOLD SPECIMEN: NORMAL
MCH RBC QN AUTO: 31.4 PG (ref 26–34)
MCHC RBC AUTO-ENTMCNC: 33.4 G/DL (ref 32–36)
MCV RBC AUTO: 94 FL (ref 80–100)
NRBC BLD-RTO: 0 /100 WBCS (ref 0–0)
P AXIS: 48 DEGREES
P OFFSET: 174 MS
P ONSET: 108 MS
PLATELET # BLD AUTO: 208 X10*3/UL (ref 150–450)
POTASSIUM SERPL-SCNC: 4 MMOL/L (ref 3.5–5.3)
PR INTERVAL: 218 MS
Q ONSET: 217 MS
QRS COUNT: 12 BEATS
QRS DURATION: 78 MS
QT INTERVAL: 354 MS
QTC CALCULATION(BAZETT): 395 MS
QTC FREDERICIA: 381 MS
R AXIS: -32 DEGREES
RBC # BLD AUTO: 3.85 X10*6/UL (ref 4.5–5.9)
SODIUM SERPL-SCNC: 136 MMOL/L (ref 136–145)
T AXIS: 80 DEGREES
T OFFSET: 394 MS
VENTRICULAR RATE: 75 BPM
WBC # BLD AUTO: 22.1 X10*3/UL (ref 4.4–11.3)

## 2024-06-14 PROCEDURE — 2500000001 HC RX 250 WO HCPCS SELF ADMINISTERED DRUGS (ALT 637 FOR MEDICARE OP)

## 2024-06-14 PROCEDURE — 99232 SBSQ HOSP IP/OBS MODERATE 35: CPT | Performed by: REGISTERED NURSE

## 2024-06-14 PROCEDURE — 2500000001 HC RX 250 WO HCPCS SELF ADMINISTERED DRUGS (ALT 637 FOR MEDICARE OP): Performed by: REGISTERED NURSE

## 2024-06-14 PROCEDURE — 97161 PT EVAL LOW COMPLEX 20 MIN: CPT | Mod: GP

## 2024-06-14 PROCEDURE — 2500000004 HC RX 250 GENERAL PHARMACY W/ HCPCS (ALT 636 FOR OP/ED): Performed by: ORTHOPAEDIC SURGERY

## 2024-06-14 PROCEDURE — 93005 ELECTROCARDIOGRAM TRACING: CPT

## 2024-06-14 PROCEDURE — 97165 OT EVAL LOW COMPLEX 30 MIN: CPT | Mod: GO

## 2024-06-14 PROCEDURE — 80048 BASIC METABOLIC PNL TOTAL CA: CPT | Performed by: ORTHOPAEDIC SURGERY

## 2024-06-14 PROCEDURE — 85027 COMPLETE CBC AUTOMATED: CPT | Performed by: ORTHOPAEDIC SURGERY

## 2024-06-14 PROCEDURE — 2500000002 HC RX 250 W HCPCS SELF ADMINISTERED DRUGS (ALT 637 FOR MEDICARE OP, ALT 636 FOR OP/ED): Performed by: ORTHOPAEDIC SURGERY

## 2024-06-14 PROCEDURE — 97535 SELF CARE MNGMENT TRAINING: CPT | Mod: GO

## 2024-06-14 PROCEDURE — 36415 COLL VENOUS BLD VENIPUNCTURE: CPT | Performed by: ORTHOPAEDIC SURGERY

## 2024-06-14 PROCEDURE — 7100000011 HC EXTENDED STAY RECOVERY HOURLY - NURSING UNIT

## 2024-06-14 PROCEDURE — 2500000001 HC RX 250 WO HCPCS SELF ADMINISTERED DRUGS (ALT 637 FOR MEDICARE OP): Performed by: ORTHOPAEDIC SURGERY

## 2024-06-14 RX ORDER — CLOPIDOGREL BISULFATE 75 MG/1
75 TABLET ORAL DAILY
Status: DISCONTINUED | OUTPATIENT
Start: 2024-06-14 | End: 2024-06-14 | Stop reason: HOSPADM

## 2024-06-14 ASSESSMENT — COGNITIVE AND FUNCTIONAL STATUS - GENERAL
MOBILITY SCORE: 22
DAILY ACTIVITIY SCORE: 18
TOILETING: A LITTLE
DRESSING REGULAR LOWER BODY CLOTHING: A LITTLE
HELP NEEDED FOR BATHING: A LITTLE
MOVING FROM LYING ON BACK TO SITTING ON SIDE OF FLAT BED WITH BEDRAILS: A LITTLE
TURNING FROM BACK TO SIDE WHILE IN FLAT BAD: A LITTLE
DRESSING REGULAR UPPER BODY CLOTHING: A LOT
PERSONAL GROOMING: A LITTLE

## 2024-06-14 ASSESSMENT — ACTIVITIES OF DAILY LIVING (ADL)
HOME_MANAGEMENT_TIME_ENTRY: 9
LACK_OF_TRANSPORTATION: NO
BATHING_ASSISTANCE: MODERATE

## 2024-06-14 ASSESSMENT — PAIN DESCRIPTION - LOCATION
LOCATION: SHOULDER
LOCATION: SHOULDER

## 2024-06-14 ASSESSMENT — PAIN SCALES - GENERAL
PAINLEVEL_OUTOF10: 4
PAINLEVEL_OUTOF10: 5 - MODERATE PAIN
PAINLEVEL_OUTOF10: 4
PAINLEVEL_OUTOF10: 6
PAINLEVEL_OUTOF10: 7

## 2024-06-14 ASSESSMENT — PAIN SCALES - PAIN ASSESSMENT IN ADVANCED DEMENTIA (PAINAD): TOTALSCORE: MEDICATION (SEE MAR);COLD APPLIED

## 2024-06-14 ASSESSMENT — PAIN - FUNCTIONAL ASSESSMENT
PAIN_FUNCTIONAL_ASSESSMENT: 0-10

## 2024-06-14 ASSESSMENT — PAIN DESCRIPTION - ORIENTATION
ORIENTATION: RIGHT
ORIENTATION: RIGHT

## 2024-06-14 NOTE — PROGRESS NOTES
Orthopedic Surgery Progress Note  Alireza Srivastavaeloyas  6/14/2024    Subjective:     Post-Operative Day # 1   Status Post right Reverse Total Shoulder Arthroplasty    Systemic or Specific Complaints: No Complaints    Objective:     Visit Vitals  /74 (Patient Position: Sitting)   Pulse 71   Temp 37 °C (98.6 °F)   Resp 18       Intake/Output Summary (Last 24 hours) at 6/14/2024 0955  Last data filed at 6/14/2024 0655  Gross per 24 hour   Intake 1801.01 ml   Output 750 ml   Net 1051.01 ml     DRAIN/TUBE OUTPUT:         General: alert and oriented, in no acute distress, appears stated age, and cooperative   Wound: no erythema, no edema, no drainage, and dressing is clean, dry, and intact   Extremity: Sling on extremity.  Distal NVI   DVT Exam: No evidence of DVT seen on physical exam.  Negative Ori's sign.  No significant calf/ankle edema.     Data Review  Labs reviewed:     CBC:   Lab Results   Component Value Date    WBC 22.1 (H) 06/14/2024    WBC 10.0 05/31/2024    WBC 12.7 (H) 09/26/2023    HGB 12.1 (L) 06/14/2024    HGB 12.6 (L) 05/31/2024    HGB 12.9 (L) 09/26/2023       BMP:    Lab Results   Component Value Date     06/14/2024     05/31/2024     09/27/2023    K 4.0 06/14/2024    K 4.0 05/31/2024    K 3.8 09/27/2023     06/14/2024     05/31/2024     09/27/2023    CO2 23 06/14/2024    CO2 26 05/31/2024    CO2 24 09/27/2023    BUN 30 (H) 06/14/2024    BUN 27 (H) 05/31/2024    BUN 30 (H) 09/27/2023    CREATININE 1.69 (H) 06/14/2024    CREATININE 1.60 (H) 05/31/2024    CREATININE 1.60 (H) 09/27/2023          I&O  I/O last 3 completed shifts:  In: 2701 (28.6 mL/kg) [I.V.:2000 (21.2 mL/kg); IV Piggyback:701]  Out: 750 (8 mL/kg) [Urine:750 (0.2 mL/kg/hr)]  Dosing Weight: 94.3 kg       Assessment:     Status Post right Reverse Total Shoulder Arthroplasty, doing well postoperatively. No acute events overnight.    Acute postoperative pain: Reports mild to moderate pain to  operative extremity. Exacerbated by movement, relieved by rest ice and pain medication. Continue current pain management.     Leukocytosis: WBC count this morning on labs 22.1 . There is no clinical indication of acute infection. Patient is afebrile, denies cough, abdominal pain, or urinary symptoms. Transient elevation in white blood count is most likely secondary to stress and inflammatory process from surgery.    Acute post op anemia: Patients hemoglobin this morning on labs 12.1 secondary to expected surgical blood loss. Patient is asymptomatic, remains hemodynamically stable, and shows no signs of active bleeding.       Plan:      1.  Continue pain control, scripts sent  2.  PT/OT: Non weightbearing to operative shoulder with no ROM. Ok for ROM to elbow, hand, and wrist.   3.  Anticipate discharge home today if pain well controlled  4.  Follow up with surgeon in office in 2 weeks    MERRILL Cardoza   6/14/2024 9:55 AM

## 2024-06-14 NOTE — NURSING NOTE
Follow up appointment noted.  Instructed incision care and monitoring for signs of infection.  Reviewed medication administration and possible side effects.   Will wear sling and use IS as instructed.

## 2024-06-14 NOTE — PROGRESS NOTES
Physical Therapy    Physical Therapy Evaluation    Patient Name: Alireza Bellamy  MRN: [id number]  Today's Date: 6/14/2024   Time Calculation  Start Time: 0910  Stop Time: 0924  Time Calculation (min): 14 min  606/606-A    Assessment/Plan   PT Assessment  End of Session Communication: Bedside nurse  Assessment Comment: Pt does not meet requirements for acute PT.  End of Session Patient Position: Up in chair, Alarm off, caregiver present (OT present)  IP OR SWING BED PT PLAN  Inpatient or Swing Bed: Inpatient  PT Plan  PT Plan: PT Eval only  PT Eval Only Reason: No acute PT needs identified  PT Frequency: PT eval only  PT Discharge Recommendations: No further acute PT  PT Recommended Transfer Status: Independent  PT - OK to Discharge: Yes (PT eval complete, ok to d/c once deemed medically appropriate.)    Subjective     Current Problem:  1. Primary osteoarthritis of right shoulder        2. Primary osteoarthritis, right shoulder  Surgical Pathology Exam    Surgical Pathology Exam      3. Pain in right shoulder  Surgical Pathology Exam    Surgical Pathology Exam      4. Post-operative pain  cephalexin (Keflex) 500 mg capsule    oxyCODONE-acetaminophen (Percocet) 5-325 mg tablet      5. H/O total shoulder replacement, right  docusate sodium (Colace) 100 mg capsule        Patient Active Problem List   Diagnosis    CAD S/P percutaneous coronary angioplasty    Carpal tunnel syndrome    Essential hypertension    Hyperlipidemia    Obesity with body mass index 30 or greater    Primary localized osteoarthrosis of shoulder region    S/P coronary artery bypass graft x 5    Chest pain    Never smoked tobacco    Class 1 obesity due to excess calories with body mass index (BMI) of 32.0 to 32.9 in adult    H/O total shoulder replacement, right    Anemia       General Visit Information:  General  Reason for Referral: s/p R rTSA  Referred By: Lisa  Past Medical History Relevant to Rehab: HLD, HTN, CAD, vertigo, BTKA, L TSA, L ankle  fx  Family/Caregiver Present: No  Co-Treatment: OT  Patient Position Received: Up in chair, Alarm off, not on at start of session  General Comment: Pt s/p elective R rTSA by Dr. Gonzalez 6/13/24.    Home Living:  Home Living  Home Living Comments: Pt lives alone in ranch home with basement. 3 MICHELLE with bilateral handrails. Laundry on 1st floor. Walk in shower with seat, no grab bars.    Prior Level of Function:  Prior Function Per Pt/Caregiver Report  Prior Function Comments: PTA pt stating independent without device with ADLs and IADLs. Independent med management. Denies falls in last 3 months. Drives.    Precautions:  Precautions  UE Weight Bearing Status: Left Non-Weight Bearing  Post-Surgical Precautions:  (No AROM L shoulder, AROM elbow, wrist, hand ok)  Braces Applied: ultrasling donned LUE    Vital Signs:     Objective     Pain:  Pain Assessment  Pain Assessment: 0-10  Pain Score: 4    General Assessments:      Activity Tolerance  Endurance: Endurance does not limit participation in activity     Strength  Strength Comments: BLE MMT 4+/5 overall           Static Sitting Balance  Static Sitting-Comment/Number of Minutes: Good  Dynamic Sitting Balance  Dynamic Sitting-Comments: Good  Static Standing Balance  Static Standing-Comment/Number of Minutes: Fair +  Dynamic Standing Balance  Dynamic Standing-Comments: Fair    Functional Assessments:     Bed Mobility  Bed Mobility: No  Transfers  Transfer: Yes  Transfer 1  Technique 1: Sit to stand, Stand to sit  Transfer Level of Assistance 1: Independent  Trials/Comments 1: Denies dizziness, no LOB  Ambulation/Gait Training  Ambulation/Gait Training Performed: Yes  Ambulation/Gait Training 1  Surface 1: Level tile  Device 1: No device  Assistance 1: Close supervision  Comments/Distance (ft) 1: Pt ambulates ~100' without assistive device. Cues to attend to R UE in ultrasling as he tends to cut cornes and bump into walls with sling.  Stairs  Stairs: Yes  Stairs  Rails 1:   (LUE on rail)  Device 1: Railing  Assistance 1: Close supervision  Comment/Number of Steps 1: Cues for hand placement on rail. No LOB.       Extremity/Trunk Assessments:        RLE   RLE : Within Functional Limits  LLE   LLE : Within Functional Limits    Outcome Measures:     [facility] Basic Mobility  Turning from your back to your side while in a flat bed without using bedrails: A little  Moving from lying on your back to sitting on the side of a flat bed without using bedrails: A little  Moving to and from bed to chair (including a wheelchair): None  Standing up from a chair using your arms (e.g. wheelchair or bedside chair): None  To walk in hospital room: None  Climbing 3-5 steps with railing: None  Basic Mobility - Total Score: 22       Goals:    Education Documentation  No documentation found.  Education Comments  No comments found.

## 2024-06-14 NOTE — PROGRESS NOTES
Alireza Bellamy is a 69 y.o. male on day 0 of admission presenting with H/O total shoulder replacement, right.      Subjective   Patient examined and seen. Alert and oriented x3, resting comfortably.  Patient denies chest pain, shortness of breath, palpitations, abdominal pain, fever or chills.  Patient is agreeable to go home when cleared.  Reports support system is intact.    States pain is well controlled.     Objective     Last Recorded Vitals  /74 (Patient Position: Sitting)   Pulse 71   Temp 37 °C (98.6 °F)   Resp 18   Wt 94.3 kg (207 lb 14.3 oz)   SpO2 94%   Intake/Output last 3 Shifts:    Intake/Output Summary (Last 24 hours) at 6/14/2024 1257  Last data filed at 6/14/2024 0655  Gross per 24 hour   Intake 1601.01 ml   Output 750 ml   Net 851.01 ml       Admission Weight  Weight: 94.3 kg (207 lb 14.3 oz) (06/13/24 0535)    Daily Weight  06/13/24 : 94.3 kg (207 lb 14.3 oz)    Image Results  ECG 12 lead  Sinus rhythm with 1st degree AV block  Possible Left atrial enlargement  Left axis deviation  Abnormal ECG  When compared with ECG of 13-JUN-2024 22:16, (unconfirmed)  Premature ventricular complexes are no longer Present      Physical Exam  Constitutional: Well developed, awake/alert/oriented x3, cooperative  Respiratory/Thorax: Patent airways,  normal breath sounds   Cardiovascular: Regular, rate and rhythm,  2+ equal pulses of the extremities, normal S 1and S 2  Gastrointestinal: Nondistended, soft, non-tender,   Musculoskeletal: mild decrease range of motion to right shoulder  s/p sx intervention, good capillary refills bilaterally, +2 pulses,    Extremities: normal extremities,  incision covered with Aquacel, CDI , sling in place   Neurological: alert/oriented x 3, speech clear      Relevant Results  Scheduled medications  atorvastatin, 80 mg, oral, Nightly  carvedilol, 12.5 mg, oral, BID  clopidogrel, 75 mg, oral, Daily  isosorbide mononitrate ER, 30 mg, oral, Daily  [Held by provider]  losartan, 25 mg, oral, Daily  nicotine, 1 patch, transdermal, Daily  pantoprazole, 40 mg, oral, Daily before breakfast  polyethylene glycol, 17 g, oral, Daily      Continuous medications  lactated Ringer's, 50 mL/hr, Last Rate: 50 mL/hr (06/13/24 0751)  lactated Ringer's, 50 mL/hr, Last Rate: Stopped (06/14/24 0641)      PRN medications  PRN medications: acetaminophen, calcium carbonate, cyclobenzaprine, HYDROmorphone, HYDROmorphone, morphine, naloxone, naloxone, naloxone, naloxone, oxyCODONE, oxyCODONE-acetaminophen, oxyCODONE-acetaminophen, promethazine (Phenergan) 6.25 mg in sodium chloride 0.9% 50 mL IV    Results for orders placed or performed during the hospital encounter of 06/13/24 (from the past 24 hour(s))   Basic metabolic panel   Result Value Ref Range    Glucose 140 (H) 74 - 99 mg/dL    Sodium 136 136 - 145 mmol/L    Potassium 4.0 3.5 - 5.3 mmol/L    Chloride 103 98 - 107 mmol/L    Bicarbonate 23 21 - 32 mmol/L    Anion Gap 14 10 - 20 mmol/L    Urea Nitrogen 30 (H) 6 - 23 mg/dL    Creatinine 1.69 (H) 0.50 - 1.30 mg/dL    eGFR 43 (L) >60 mL/min/1.73m*2    Calcium 9.1 8.6 - 10.3 mg/dL   CBC POD 1   Result Value Ref Range    WBC 22.1 (H) 4.4 - 11.3 x10*3/uL    nRBC 0.0 0.0 - 0.0 /100 WBCs    RBC 3.85 (L) 4.50 - 5.90 x10*6/uL    Hemoglobin 12.1 (L) 13.5 - 17.5 g/dL    Hematocrit 36.2 (L) 41.0 - 52.0 %    MCV 94 80 - 100 fL    MCH 31.4 26.0 - 34.0 pg    MCHC 33.4 32.0 - 36.0 g/dL    RDW 13.2 11.5 - 14.5 %    Platelets 208 150 - 450 x10*3/uL   SST TOP   Result Value Ref Range    Extra Tube Hold for add-ons.    ECG 12 lead   Result Value Ref Range    Ventricular Rate 75 BPM    Atrial Rate 75 BPM    LA Interval 218 ms    QRS Duration 78 ms    QT Interval 354 ms    QTC Calculation(Bazett) 395 ms    P Axis 48 degrees    R Axis -32 degrees    T Axis 80 degrees    QRS Count 12 beats    Q Onset 217 ms    P Onset 108 ms    P Offset 174 ms    T Offset 394 ms    QTC Fredericia 381 ms            Assessment/Plan      #  Right Shoulder Arthoplasty   Right Shoulder Pain  Admit to Orthopedics Team   Hospitalist team Consulted   DVTp and Pain management per Ortho   PT/OT evaluation  and treatment   CBC/BMP as appropriate, review results  Pulmonary Toileting   Follow up as needed outpatient with Orthopedics      # HTN / HLD / CAD  Telemetry for arrhythmia monitoring    Continue Statin   Continue Coreg and Imdur with parameters  Hemodynamically stable  Plavix to be restarted per Primary      #CKD   Baseline 1.60  Avoid nephrotoxic agents  Trend    stable post op    #Nicotine Dependence  Smoking cessation encouraged and discussed  Nicotine patch if needed  Outpatient education to be provided  at discharge       Leukocytosis  Likely 2/2 inflammatory process post op  Expected to return to baseline  Afebrile, no s/sx of infection   Monitor outpatient   Patient will be educated on s/sx of infection at discharge     Thank you for consult  MEDICINE TO SIGN OFF  PT TO DISCHARGE LATER TODAY PER ORTHO   Call for acute needs.     Time spent  36  minutes obtaining labs, imaging, recommendations, interview, assessment, examination, medication review/ordering, and EMR review.     Plan of care was discussed extensively with patient, RN and Ortho NP. Patient verbalized understanding through teach back method. All questions and concerns addressed upon examination.      Of note, this documentation is completed using the Dragon Dictation system (voice recognition software). There may be spelling and/or grammatical errors that were not corrected prior to final submission.        Ani Barahona, APRN-CNP

## 2024-06-14 NOTE — PROGRESS NOTES
06/14/24 1035   Discharge Planning   Living Arrangements Alone   Support Systems Children;Spouse/significant other  (son, daughter and ex-wife to assist as needed)   Assistance Needed none, Pt states independent in ADLS and IADLS no AD, drives, denies falls. Pt owns tub and walk-in shower with seat and grab bar, grab bars around toilet   Type of Residence Private residence  (1 level Ranch home with basement, laundry on 1st floor)   Number of Stairs to Enter Residence 3  (with handrail)   Number of Stairs Within Residence 12   Do you have animals or pets at home? No   Home or Post Acute Services None   Patient expects to be discharged to: Home   Does the patient need discharge transport arranged? No   Financial Resource Strain   How hard is it for you to pay for the very basics like food, housing, medical care, and heating? Not hard   Housing Stability   In the last 12 months, was there a time when you were not able to pay the mortgage or rent on time? N   In the last 12 months, how many places have you lived? 1   In the last 12 months, was there a time when you did not have a steady place to sleep or slept in a shelter (including now)? N   Transportation Needs   In the past 12 months, has lack of transportation kept you from medical appointments or from getting medications? no   In the past 12 months, has lack of transportation kept you from meetings, work, or from getting things needed for daily living? No     Pt is s/p POD #1 Elective Right reverse total shoulder arthroplasty, Right shoulder open rotator cuff repair, Right shoulder open biceps tenodesis and Right shoulder open AC joint cyst excision on 6/13/24 with Dr. Nicola Gonzalez. [facility] score is PT (22) OT (pending), pt has no further PT needs. Pt discharge preference is home, declines home going needs, states son, daughter and ex-wife are available to assist as needed. No DC needs identified at this time.

## 2024-06-14 NOTE — PROGRESS NOTES
"Occupational Therapy    Evaluation/Treatment    Patient Name: Alireza Bellamy \"Gavin\"  MRN: [id number]  : 1955  Today's Date: 24  Time Calculation  Start Time: 911  Stop Time: 945  Time Calculation (min): 34 min       Assessment:  End of Session Communication: Bedside nurse  End of Session Patient Position: Up in chair, Alarm off, caregiver present  OT Assessment Results: Decreased ADL status, Decreased endurance, Decreased functional mobility    Plan:  No Skilled OT: No acute OT goals identified  OT Frequency: OT eval only  OT Discharge Recommendations: No OT needed after discharge  OT Recommended Transfer Status: Stand by assist  OT - OK to Discharge: Yes     Subjective       General:   OT Received On: 24  General  Reason for Referral: s/p R rTSA  Referred By: Lisa  Past Medical History Relevant to Rehab: HLD, HTN, CAD, vertigo, BTKA, L TSA, L ankle fx  Family/Caregiver Present: No  Patient Position Received: Up in chair, Alarm off, not on at start of session  General Comment: Pt s/p elective R rTSA by Dr. Gonzalez 24.    Precautions:  UE Weight Bearing Status: Left Non-Weight Bearing  Post-Surgical Precautions:  (No AROM L shoulder, AROM elbow, wrist, hand ok)  Braces Applied: ultrasling donned R UE           Pain:  Pain Assessment  Pain Assessment: 0-10  Pain Score: 4  Objective     Cognition:  Overall Cognitive Status: Within Functional Limits             Home Living:  Home Living Comments: Pt lives alone in ranch home with basement. 3 MICHELLE with bilateral handrails. Laundry on 1st floor. Walk in shower with seat, no grab bars.Pt reports son will stay with pt at dc    Prior Function:  Hand Dominance: Right  Prior Function Comments: PTA pt stating independent without device with ADLs and IADLs. Independent med management. Denies falls in last 3 months. Drives.           Activities of Daily Living:   Eating Assistance: Independent  Grooming Assistance: Stand by  Bathing Assistance: " Moderate  UE Dressing Assistance: Moderate  LE Dressing Assistance: Minimal  LE Dressing Deficit: Pull up over hips  Toileting Assistance with Device:  (CGA)  Functional Assistance: Stand by (vc's to allow for increased clearance on R side when ambulating)                         Activity Tolerance:  Endurance: Endurance does not limit participation in activity           Bed Mobility/Transfers: Bed Mobility  Bed Mobility: No  Transfers  Transfer:  (sit to stand from chair indep)                Balance:  Static Sitting: good  Dynamic Sitting: fair +  Static Standing: fair+  Dynamic Standing: fair +         Vision:Vision - Basic Assessment  Current Vision: Wears glasses all the time        Sensation:  Light Touch: No apparent deficits    Strength:  Strength Comments: L UE 4/5 throughout            Extremities: RUE   RUE :  (R elbow, wrist and hand AROM WFL) and LUE   LUE: Within Functional Limits    Outcome Measures: [facility] Daily Activity  Putting on and taking off regular lower body clothing: A little  Bathing (including washing, rinsing, drying): A little  Putting on and taking off regular upper body clothing: A lot  Toileting, which includes using toilet, bedpan or urinal: A little  Taking care of personal grooming such as brushing teeth: A little  Eating Meals: None  Daily Activity - Total Score: 18    Education Documentation  ADL Training, taught by Keila Mancera OT at 6/14/2024 12:22 PM.  Learner: Patient  Readiness: Acceptance  Method: Explanation, Handout, Demonstration  Response: Verbalizes Understanding, Demonstrated Understanding          EDUCATION:  Education  Education Comment: OT educated pt on UB dressing strategies, sling doff/don instruction, R elbow, wristhand ROM, and precautions of NO shoulder ROM and NWB R UE. Pt verbalized understanding, written handout provided.

## 2024-06-14 NOTE — NURSING NOTE
"Secure message sent to Dr. Sinclair \"Hi Dr. Sinclair - Pt c/o of indigestion. However, pt is describing discomfort in chest at \"tightness\" and rating 6/10. Pt is slightly diaphoretic. Last blood pressure 172/93 with heart rate of 83. Spo2 92% on RA. I have since placed him on 2L. I will get EKG. Any other orders? Thanks. Kerry\"  "

## 2024-06-14 NOTE — DISCHARGE SUMMARY
Discharge summary  This patient Alireza Bellamy was admitted to the hospital on 6/13/2024  after undergoing Procedure(s) (LRB):  TOTAL SHOULDER REPLACEMENT - REVERSE (Right) without complications that morning.    During the postoperative period,while in hospital, patient was medically managed by the hospitalist. Please see medial notes and H&P for patients additional diagnoses.  Ortho agrees with all medical diagnoses and treatments while patient in hospital.  No significant or unexpected findings or abnormal ortho imaging were noted during the hospital stay    Hospital course      Patient tolerated surgical procedure well and there was no complications. Patient progressed adequately through their recovery during hospital stay including PT and rehabilitation.    Patient was then D/C on  to home  in stable condition.  Patient was instructed on the use of pain medications, the signs and symptoms of infection, and was given our number to call should they have any questions or concerns following discharge.    Based on my clinical judgment, the patient was provided with a 7-day prescription for opioid medication at 30 MED, indicated for treatment of acute pain in the setting of recent surgery. OARRS report was run and has demonstrated an appropriate time course.  The patient has been provided with counseling pertaining to safe use of opioid medication.      Patient NWB to operative extremity. No active movement of operative shoulder. Patient to remain in sling at all times unless with therapy. Patient can come out of the sling for active range of motion of elbow, wrist, hand and fingers with support only.   Mepilex dressing to be removed POD#7 and incision left open to air  Follow up with surgeon in 2 weeks

## 2024-06-28 ENCOUNTER — CLINICAL SUPPORT (OUTPATIENT)
Dept: ORTHOPEDIC SURGERY | Facility: CLINIC | Age: 69
End: 2024-06-28
Payer: MEDICARE

## 2024-06-28 ENCOUNTER — APPOINTMENT (OUTPATIENT)
Dept: ORTHOPEDIC SURGERY | Facility: CLINIC | Age: 69
End: 2024-06-28
Payer: MEDICARE

## 2024-06-28 DIAGNOSIS — M25.511 RIGHT SHOULDER PAIN, UNSPECIFIED CHRONICITY: ICD-10-CM

## 2024-06-28 DIAGNOSIS — M19.011 PRIMARY OSTEOARTHRITIS OF RIGHT SHOULDER: Primary | ICD-10-CM

## 2024-06-28 PROCEDURE — 73030 X-RAY EXAM OF SHOULDER: CPT | Mod: RIGHT SIDE | Performed by: ORTHOPAEDIC SURGERY

## 2024-06-28 PROCEDURE — 1159F MED LIST DOCD IN RCRD: CPT | Performed by: PHYSICIAN ASSISTANT

## 2024-06-28 PROCEDURE — 99024 POSTOP FOLLOW-UP VISIT: CPT | Performed by: PHYSICIAN ASSISTANT

## 2024-06-28 NOTE — PROGRESS NOTES
History: Alireza Bellamy is here today for his first post-op visit.  He is two weeks out from a reverse total shoulder arthroplasty.  He is having no pain.  He comes in today not wearing his sling.  He states he cannot drive with it on and has been using it some when he is at home.    Physical Exam: The wound is healing nicely. There is no redness, irritation, or drainage. He has appropriate passive ROM at the side. Swelling and ecchymosis are normal for this stage of healing.  Mild swelling around the wound but none proximally around where the previous AC joint cyst was.  He is neurovascularly intact throughout the extremity.     Radiographs: AP and lateral views of the right shoulder show excellent alignment of the reverse shoulder arthroplasty with no acute abnormality.     Assessment: Stable right reverse total shoulder arthroplasty and AC joint cyst excision, 2 weeks out.     Plan:   He is going to begin physical therapy.  The order was given today.  He is planning to go to PT services in [place].  He going to wear the sling at all times except for hygiene purposes.  We stressed the importance of sling use after the surgery.  He is going to go home and put his sling back on except for hygiene purposes.  He will continue to ice several times throughout the day.  He will continue to wean down off narcotics.   He will follow up in 3-4 weeks for a follow up with 2 view shoulder x-rays.   All questions were answered with the patient.

## 2024-07-04 LAB
LABORATORY COMMENT REPORT: NORMAL
PATH REPORT.FINAL DX SPEC: NORMAL
PATH REPORT.GROSS SPEC: NORMAL
PATH REPORT.RELEVANT HX SPEC: NORMAL
PATH REPORT.TOTAL CANCER: NORMAL

## 2024-07-26 ENCOUNTER — APPOINTMENT (OUTPATIENT)
Dept: ORTHOPEDIC SURGERY | Facility: CLINIC | Age: 69
End: 2024-07-26
Payer: MEDICARE

## 2024-07-26 ENCOUNTER — CLINICAL SUPPORT (OUTPATIENT)
Dept: ORTHOPEDIC SURGERY | Facility: CLINIC | Age: 69
End: 2024-07-26
Payer: MEDICARE

## 2024-07-26 DIAGNOSIS — M19.011 PRIMARY OSTEOARTHRITIS OF RIGHT SHOULDER: ICD-10-CM

## 2024-07-26 DIAGNOSIS — M19.011 PRIMARY OSTEOARTHRITIS OF RIGHT SHOULDER: Primary | ICD-10-CM

## 2024-07-26 PROCEDURE — 73030 X-RAY EXAM OF SHOULDER: CPT | Mod: RIGHT SIDE | Performed by: ORTHOPAEDIC SURGERY

## 2024-07-26 PROCEDURE — 99024 POSTOP FOLLOW-UP VISIT: CPT | Performed by: ORTHOPAEDIC SURGERY

## 2024-07-26 NOTE — PROGRESS NOTES
History: Alireza is here for recheck of his right shoulder.  He is 6 weeks out from a right reverse shoulder arthroplasty.  He has been doing outpatient therapy at PT services.  He states he was told to start to get out of the sling by his therapist.  He has not worn it for about 2 weeks.  He reports no pain.  He is here for follow-up.     Physical Exam: The wound is healing very nicely.  Previous AC joint cyst is not visible.  He has some tightness with external rotation beyond 30 degrees today.  He can easily hold the arm at 90 degrees of abduction with no scapular elevation.  Actively he can reach the arm fully overhead.  Elbow and hand move well.  He denies any numbness or tingling.     Radiographs: AP and lateral views of the right shoulder show excellent alignment of his right shoulder arthroplasty with no evidence of loosening.    Assessment: Stable right reverse shoulder arthroplasty, 6 weeks out    Plan: We discussed removing his pillow from his sling when he gets home and still using the sling portion as needed for any soreness.  He can continue to progress per the protocol in therapy.  We also discussed the importance of avoiding any weight more than 1 pound.  He is going to follow-up in another 5 to 6 weeks to assess progress with 2 view right shoulder x-rays.  If doing well at that time he can be released to full activity.  All questions were answered with the patient.

## 2024-09-06 ENCOUNTER — APPOINTMENT (OUTPATIENT)
Dept: ORTHOPEDIC SURGERY | Facility: CLINIC | Age: 69
End: 2024-09-06
Payer: MEDICARE

## 2024-09-18 DIAGNOSIS — Z98.61 CAD S/P PERCUTANEOUS CORONARY ANGIOPLASTY: ICD-10-CM

## 2024-09-18 DIAGNOSIS — E78.2 MIXED HYPERLIPIDEMIA: ICD-10-CM

## 2024-09-18 DIAGNOSIS — I25.10 CAD S/P PERCUTANEOUS CORONARY ANGIOPLASTY: ICD-10-CM

## 2024-09-18 DIAGNOSIS — I10 ESSENTIAL HYPERTENSION: ICD-10-CM

## 2024-09-18 RX ORDER — ATORVASTATIN CALCIUM 80 MG/1
80 TABLET, FILM COATED ORAL DAILY
Qty: 90 TABLET | Refills: 3 | Status: SHIPPED | OUTPATIENT
Start: 2024-09-18 | End: 2025-09-18

## 2024-09-18 RX ORDER — LOSARTAN POTASSIUM 25 MG/1
25 TABLET ORAL DAILY
Qty: 90 TABLET | Refills: 3 | Status: SHIPPED | OUTPATIENT
Start: 2024-09-18 | End: 2025-09-18

## 2024-09-18 RX ORDER — ISOSORBIDE MONONITRATE 30 MG/1
30 TABLET, EXTENDED RELEASE ORAL DAILY
Qty: 90 TABLET | Refills: 3 | Status: SHIPPED | OUTPATIENT
Start: 2024-09-18 | End: 2025-09-18

## 2024-09-18 NOTE — TELEPHONE ENCOUNTER
Received request for prescription refills for patient.   Patient follows with Dr. Deborah Oates MD, [facility]      Request is for Isosorbide, Atorvastatin , and Losartan  Is patient currently on medication yes    Last OV 4/16/24  Next OV 10/15/24    Pended for signing and sent to provider

## 2024-10-15 ENCOUNTER — APPOINTMENT (OUTPATIENT)
Dept: CARDIOLOGY | Facility: CLINIC | Age: 69
End: 2024-10-15
Payer: MEDICARE

## 2024-10-15 VITALS
BODY MASS INDEX: 33.18 KG/M2 | DIASTOLIC BLOOD PRESSURE: 64 MMHG | HEART RATE: 56 BPM | WEIGHT: 215 LBS | SYSTOLIC BLOOD PRESSURE: 126 MMHG

## 2024-10-15 DIAGNOSIS — Z95.1 S/P CORONARY ARTERY BYPASS GRAFT X 5: ICD-10-CM

## 2024-10-15 DIAGNOSIS — I25.10 CAD S/P PERCUTANEOUS CORONARY ANGIOPLASTY: ICD-10-CM

## 2024-10-15 DIAGNOSIS — I10 ESSENTIAL HYPERTENSION: ICD-10-CM

## 2024-10-15 DIAGNOSIS — Z78.9 NEVER SMOKED TOBACCO: ICD-10-CM

## 2024-10-15 DIAGNOSIS — E78.2 MIXED HYPERLIPIDEMIA: ICD-10-CM

## 2024-10-15 DIAGNOSIS — Z98.61 CAD S/P PERCUTANEOUS CORONARY ANGIOPLASTY: ICD-10-CM

## 2024-10-15 PROCEDURE — 99213 OFFICE O/P EST LOW 20 MIN: CPT | Performed by: INTERNAL MEDICINE

## 2024-10-15 PROCEDURE — 1159F MED LIST DOCD IN RCRD: CPT | Performed by: INTERNAL MEDICINE

## 2024-10-15 PROCEDURE — 3074F SYST BP LT 130 MM HG: CPT | Performed by: INTERNAL MEDICINE

## 2024-10-15 PROCEDURE — 3078F DIAST BP <80 MM HG: CPT | Performed by: INTERNAL MEDICINE

## 2024-10-15 RX ORDER — CARVEDILOL 12.5 MG/1
12.5 TABLET ORAL
Qty: 180 TABLET | Refills: 3 | Status: SHIPPED | OUTPATIENT
Start: 2024-10-15 | End: 2025-10-15

## 2024-10-15 RX ORDER — NITROGLYCERIN 0.4 MG/1
0.4 TABLET SUBLINGUAL EVERY 5 MIN PRN
Qty: 25 TABLET | Refills: 5 | Status: SHIPPED | OUTPATIENT
Start: 2024-10-15

## 2024-10-15 NOTE — PATIENT INSTRUCTIONS
Follow up office visit in 9 months  Continue same medications/treatment.  Patient educated on proper medication use.  Patient educated on risk factor modification.  Please bring any lab results from other providers / physicians to your next appointment.    Please bring all medicines, vitamins and herbal supplements with you when you come to the office.    Prescriptions will not be filled unless you are compliant with your follow up appointments or have a follow up  appointment scheduled as per instruction of your physician.  Refills should be requested at the time of  Your visit.    IOlga LPN, am scribing for and in the presence of  Dr. Deborah Oates MD, FACC

## 2024-10-15 NOTE — PROGRESS NOTES
Referred by Dr. Kendall ref. provider found provider found for   Chief Complaint   Patient presents with    Follow-up     6 month follow-up        History of Present Illness  Alireza Bellamy is a 69 y.o. year old male patient doing well from a cardiac standpoint no complaint no symptoms of chest pain or shortness of breath.  Denies symptoms of palpitations syncope or presyncope.  I discussed with the patient in length we will continue medication will call for any problem and follow-up as scheduled    Past Medical History  Past Medical History:   Diagnosis Date    Arthritis     Coronary artery disease     Hearing aid worn     bilateral    Heart disease     HL (hearing loss)     Hyperlipidemia     Hypertension     Joint pain     Nephrolithiasis     PONV (postoperative nausea and vomiting)     Vertigo     Wears glasses        Social History  Social History     Tobacco Use    Smoking status: Never    Smokeless tobacco: Current     Types: Chew   Vaping Use    Vaping status: Never Used   Substance Use Topics    Alcohol use: Never    Drug use: Never       Family History     Family History   Problem Relation Name Age of Onset    Coronary artery disease Mother      Other (PTCA) Mother      Leukemia Father         Review of Systems  As per HPI, all other systems reviewed and negative.    Allergies:  No Known Allergies     Outpatient Medications:  Current Outpatient Medications   Medication Instructions    aspirin 81 mg, oral, Daily    atorvastatin (LIPITOR) 80 mg, oral, Daily    carvedilol (COREG) 12.5 mg, oral, 2 times daily (morning and late afternoon)    cephalexin (KEFLEX) 500 mg, oral, Every 6 hours    clopidogrel (Plavix) 75 mg tablet First day take 8 tablets for loading dose (600mg) then one a day after that    coenzyme Q-10 100 mg, oral, Daily    cyclobenzaprine (FLEXERIL) 10 mg, oral, Nightly PRN    isosorbide mononitrate ER (IMDUR) 30 mg, oral, Daily, Do not crush or chew.    losartan (COZAAR) 25 mg, oral, Daily     naloxone (Narcan) 4 mg/0.1 mL nasal spray Instill one spray intranasally for opioid overdose; repeat in 5 minutes if no response    naproxen sodium (ALEVE) 220 mg, oral, Every 12 hours PRN    nitroglycerin (NITROSTAT) 0.4 mg, sublingual, Every 5 min PRN    oxyCODONE-acetaminophen (Percocet) 5-325 mg tablet 1 tablet, oral, Every 4 hours PRN         Vitals:  Vitals:    10/15/24 0812   BP: 126/64   Pulse: 56       Physical Exam:  Physical Exam  Vitals and nursing note reviewed.   Constitutional:       Appearance: Normal appearance.   HENT:      Head: Normocephalic and atraumatic.   Eyes:      Extraocular Movements: Extraocular movements intact.      Pupils: Pupils are equal, round, and reactive to light.   Cardiovascular:      Rate and Rhythm: Normal rate and regular rhythm.      Pulses: Normal pulses.   Pulmonary:      Effort: Pulmonary effort is normal.      Breath sounds: Normal breath sounds.   Musculoskeletal:         General: Normal range of motion.      Cervical back: Normal range of motion.      Right lower leg: No edema.      Left lower leg: No edema.   Skin:     General: Skin is warm and dry.   Neurological:      General: No focal deficit present.      Mental Status: He is alert and oriented to person, place, and time.             Assessment/Plan   Diagnoses and all orders for this visit:  CAD S/P percutaneous coronary angioplasty  S/P coronary artery bypass graft x 5  Essential hypertension  Mixed hyperlipidemia  BMI 33.0-33.9,adult  Never smoked tobacco          Deborah Oates MD [facility]  Interventional Cardiology   of [facility]     Thank you for allowing me to participate in the care of this patient. Please do not hesitate to contact me with any further questions or concerns.

## 2024-12-04 ENCOUNTER — OFFICE VISIT (OUTPATIENT)
Dept: FAMILY MEDICINE CLINIC | Age: 69
End: 2024-12-04
Payer: MEDICARE

## 2024-12-04 VITALS
OXYGEN SATURATION: 95 % | DIASTOLIC BLOOD PRESSURE: 88 MMHG | HEART RATE: 65 BPM | WEIGHT: 218 LBS | HEIGHT: 68 IN | BODY MASS INDEX: 33.04 KG/M2 | SYSTOLIC BLOOD PRESSURE: 134 MMHG | TEMPERATURE: 97.1 F

## 2024-12-04 DIAGNOSIS — M54.42 CHRONIC BILATERAL LOW BACK PAIN WITH BILATERAL SCIATICA: Primary | ICD-10-CM

## 2024-12-04 DIAGNOSIS — M54.41 CHRONIC BILATERAL LOW BACK PAIN WITH BILATERAL SCIATICA: Primary | ICD-10-CM

## 2024-12-04 DIAGNOSIS — G89.29 CHRONIC BILATERAL LOW BACK PAIN WITH BILATERAL SCIATICA: Primary | ICD-10-CM

## 2024-12-04 PROCEDURE — 1125F AMNT PAIN NOTED PAIN PRSNT: CPT | Performed by: FAMILY MEDICINE

## 2024-12-04 PROCEDURE — 3075F SYST BP GE 130 - 139MM HG: CPT | Performed by: FAMILY MEDICINE

## 2024-12-04 PROCEDURE — 1123F ACP DISCUSS/DSCN MKR DOCD: CPT | Performed by: FAMILY MEDICINE

## 2024-12-04 PROCEDURE — 99213 OFFICE O/P EST LOW 20 MIN: CPT | Performed by: FAMILY MEDICINE

## 2024-12-04 PROCEDURE — 1159F MED LIST DOCD IN RCRD: CPT | Performed by: FAMILY MEDICINE

## 2024-12-04 PROCEDURE — 3079F DIAST BP 80-89 MM HG: CPT | Performed by: FAMILY MEDICINE

## 2024-12-04 RX ORDER — CYCLOBENZAPRINE HCL 10 MG
10 TABLET ORAL NIGHTLY PRN
COMMUNITY
Start: 2024-06-07

## 2024-12-04 RX ORDER — METHYLPREDNISOLONE 4 MG/1
TABLET ORAL
Qty: 1 KIT | Refills: 0 | Status: SHIPPED | OUTPATIENT
Start: 2024-12-04 | End: 2024-12-10

## 2024-12-04 RX ORDER — CARVEDILOL 12.5 MG/1
12.5 TABLET ORAL 2 TIMES DAILY WITH MEALS
COMMUNITY

## 2024-12-04 NOTE — PROGRESS NOTES
[facility]  [address] [place] [id number]  Dept: [phone]  Dept Fax: [phone]     Chief Complaint:  Chief Complaint   Patient presents with    Back Pain     Lower back, x2 months    Leg Pain     B/L, difficulty walking, cramping in back of thighs, x2 months       Vitals:    12/04/24 1538   BP: 134/88   Pulse: 65   Temp: 97.1 °F (36.2 °C)   TempSrc: Infrared   SpO2: 95%   Weight: 98.9 kg (218 lb)   Height: 1.715 m (5' 7.5\")       HPI:  69 y.o.male who presents for the following:      LBP: chronic pain going down the posterior b/l LEs; getting cramping; bothering him more for the past 2mo; pain with walking or getting out of bed; using heating pad without relief    -----------------------------------------------------------------------------    Assessment/Plan:  69 y.o. male here mainly for the following:  LBP with sciatica  Trial of steroid  If no improvement then will on imaging, PT, and possibly spine clinic next        ICD-10-CM    1. Chronic bilateral low back pain with bilateral sciatica  M54.42 methylPREDNISolone (MEDROL DOSEPACK) 4 MG tablet    M54.41     G89.29           Return if symptoms worsen or fail to improve.    Kingston Love MD      -----------------------------------------------------------------------------      Objective     Physical Exam:  Physical Exam  Vitals reviewed.   Constitutional:       General: He is not in acute distress.     Appearance: He is well-developed.   HENT:      Head: Normocephalic and atraumatic.   Cardiovascular:      Rate and Rhythm: Normal rate.   Pulmonary:      Effort: Pulmonary effort is normal. No respiratory distress.   Musculoskeletal:      Cervical back: Normal range of motion.   Skin:     General: Skin is warm and dry.   Neurological:      Mental Status: He is alert and oriented to person, place, and time.   Psychiatric:         Behavior: Behavior normal.           Review of systems as noted in HPI    Past Medical

## 2024-12-10 ENCOUNTER — TELEPHONE (OUTPATIENT)
Dept: FAMILY MEDICINE CLINIC | Age: 69
End: 2024-12-10

## 2024-12-10 DIAGNOSIS — G89.29 CHRONIC BILATERAL LOW BACK PAIN WITH BILATERAL SCIATICA: Primary | ICD-10-CM

## 2024-12-10 DIAGNOSIS — M54.42 CHRONIC BILATERAL LOW BACK PAIN WITH BILATERAL SCIATICA: Primary | ICD-10-CM

## 2024-12-10 DIAGNOSIS — M54.41 CHRONIC BILATERAL LOW BACK PAIN WITH BILATERAL SCIATICA: Primary | ICD-10-CM

## 2024-12-10 NOTE — TELEPHONE ENCOUNTER
Patient states he was given Medrol dosepack last visit and the first day or two of taking these, had improvement of his symptoms. As reducing dosing, states the pain is returning to prior state. He states you mentioned another treatment option and would like to know what this is.

## 2024-12-11 NOTE — TELEPHONE ENCOUNTER
I think checking imaging, starting physical therapy, and seeing the spine clinic could be helpful. Would you be interested in all or some of these?

## 2024-12-16 RX ORDER — GABAPENTIN 100 MG/1
CAPSULE ORAL
Qty: 99 CAPSULE | Refills: 1 | Status: SHIPPED | OUTPATIENT
Start: 2024-12-16 | End: 2025-01-21

## 2024-12-16 NOTE — TELEPHONE ENCOUNTER
Patient made aware of message and states that this would be okay. Uses ALFONZO Oglesby. Aware this will sent over by end of day today.

## 2024-12-17 NOTE — TELEPHONE ENCOUNTER
I've placed an order for gabapentin. Be sure to follow the instructions on the bottle. This is the low dose. We'll probably need to increase it at some point. Come visit me in a month if you need an adjustment.

## 2025-02-05 ENCOUNTER — ANCILLARY PROCEDURE (OUTPATIENT)
Dept: INTERNAL MEDICINE | Age: 70
End: 2025-02-05
Payer: MEDICARE

## 2025-02-05 ENCOUNTER — OFFICE VISIT (OUTPATIENT)
Dept: FAMILY MEDICINE CLINIC | Age: 70
End: 2025-02-05
Payer: MEDICARE

## 2025-02-05 VITALS
SYSTOLIC BLOOD PRESSURE: 120 MMHG | HEIGHT: 68 IN | OXYGEN SATURATION: 95 % | WEIGHT: 224 LBS | HEART RATE: 61 BPM | DIASTOLIC BLOOD PRESSURE: 84 MMHG | BODY MASS INDEX: 33.95 KG/M2

## 2025-02-05 DIAGNOSIS — M54.41 CHRONIC BILATERAL LOW BACK PAIN WITH BILATERAL SCIATICA: ICD-10-CM

## 2025-02-05 DIAGNOSIS — G89.29 CHRONIC BILATERAL LOW BACK PAIN WITH BILATERAL SCIATICA: ICD-10-CM

## 2025-02-05 DIAGNOSIS — M54.42 CHRONIC BILATERAL LOW BACK PAIN WITH BILATERAL SCIATICA: Primary | ICD-10-CM

## 2025-02-05 DIAGNOSIS — M54.42 CHRONIC BILATERAL LOW BACK PAIN WITH BILATERAL SCIATICA: ICD-10-CM

## 2025-02-05 DIAGNOSIS — M54.41 CHRONIC BILATERAL LOW BACK PAIN WITH BILATERAL SCIATICA: Primary | ICD-10-CM

## 2025-02-05 DIAGNOSIS — G89.29 CHRONIC BILATERAL LOW BACK PAIN WITH BILATERAL SCIATICA: Primary | ICD-10-CM

## 2025-02-05 PROCEDURE — 3079F DIAST BP 80-89 MM HG: CPT | Performed by: FAMILY MEDICINE

## 2025-02-05 PROCEDURE — 3074F SYST BP LT 130 MM HG: CPT | Performed by: FAMILY MEDICINE

## 2025-02-05 PROCEDURE — 72110 X-RAY EXAM L-2 SPINE 4/>VWS: CPT

## 2025-02-05 PROCEDURE — 1123F ACP DISCUSS/DSCN MKR DOCD: CPT | Performed by: FAMILY MEDICINE

## 2025-02-05 PROCEDURE — 99213 OFFICE O/P EST LOW 20 MIN: CPT | Performed by: FAMILY MEDICINE

## 2025-02-05 PROCEDURE — 1159F MED LIST DOCD IN RCRD: CPT | Performed by: FAMILY MEDICINE

## 2025-02-05 RX ORDER — HYDROCHLOROTHIAZIDE 12.5 MG/1
TABLET ORAL
COMMUNITY

## 2025-02-05 RX ORDER — GABAPENTIN 300 MG/1
300 CAPSULE ORAL 3 TIMES DAILY
Qty: 90 CAPSULE | Refills: 2 | Status: SHIPPED | OUTPATIENT
Start: 2025-02-05 | End: 2025-05-06

## 2025-02-05 SDOH — ECONOMIC STABILITY: FOOD INSECURITY: WITHIN THE PAST 12 MONTHS, YOU WORRIED THAT YOUR FOOD WOULD RUN OUT BEFORE YOU GOT MONEY TO BUY MORE.: NEVER TRUE

## 2025-02-05 SDOH — ECONOMIC STABILITY: FOOD INSECURITY: WITHIN THE PAST 12 MONTHS, THE FOOD YOU BOUGHT JUST DIDN'T LAST AND YOU DIDN'T HAVE MONEY TO GET MORE.: NEVER TRUE

## 2025-02-05 ASSESSMENT — PATIENT HEALTH QUESTIONNAIRE - PHQ9
2. FEELING DOWN, DEPRESSED OR HOPELESS: NOT AT ALL
SUM OF ALL RESPONSES TO PHQ QUESTIONS 1-9: 0
SUM OF ALL RESPONSES TO PHQ9 QUESTIONS 1 & 2: 0
1. LITTLE INTEREST OR PLEASURE IN DOING THINGS: NOT AT ALL
SUM OF ALL RESPONSES TO PHQ QUESTIONS 1-9: 0

## 2025-02-05 NOTE — PROGRESS NOTES
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 male here mainly for the following:  LBP  Likely DDD given his work hx  Checking lumbar Xray and if benign may check MRI or send to spine clinic  He declined PT        ICD-10-CM    1. Chronic bilateral low back pain with bilateral sciatica  M54.42 XR LUMBAR SPINE (MIN 4 VIEWS)    M54.41 gabapentin (NEURONTIN) 300 MG capsule    G89.29           Return if symptoms worsen or fail to improve.    Kingston Love MD      -----------------------------------------------------------------------------      Objective     Physical Exam:  Physical Exam  Vitals reviewed.   Constitutional:       General: He is not in acute distress.     Appearance: He is well-developed.   HENT:      Head: Normocephalic and atraumatic.   Cardiovascular:      Rate and Rhythm: Normal rate.   Pulmonary:      Effort: Pulmonary effort is normal. No respiratory distress.   Musculoskeletal:      Cervical back: Normal range of

## 2025-02-10 DIAGNOSIS — M51.360 DEGENERATION OF INTERVERTEBRAL DISC OF LUMBAR REGION WITH DISCOGENIC BACK PAIN: Primary | ICD-10-CM

## 2025-02-12 ENCOUNTER — INITIAL CONSULT (OUTPATIENT)
Age: 70
End: 2025-02-12
Payer: MEDICARE

## 2025-02-12 ENCOUNTER — TELEPHONE (OUTPATIENT)
Dept: PHARMACY | Facility: CLINIC | Age: 70
End: 2025-02-12

## 2025-02-12 ENCOUNTER — HOSPITAL ENCOUNTER (OUTPATIENT)
Dept: GENERAL RADIOLOGY | Age: 70
Discharge: HOME | End: 2025-02-14
Attending: PHYSICAL MEDICINE & REHABILITATION
Payer: MEDICARE

## 2025-02-12 VITALS
OXYGEN SATURATION: 98 % | WEIGHT: 224 LBS | DIASTOLIC BLOOD PRESSURE: 84 MMHG | SYSTOLIC BLOOD PRESSURE: 148 MMHG | HEART RATE: 64 BPM | HEIGHT: 67 IN | TEMPERATURE: 98.6 F | BODY MASS INDEX: 35.16 KG/M2

## 2025-02-12 DIAGNOSIS — M79.604 BILATERAL LEG PAIN: ICD-10-CM

## 2025-02-12 DIAGNOSIS — M54.16 LUMBAR RADICULOPATHY: ICD-10-CM

## 2025-02-12 DIAGNOSIS — M79.605 BILATERAL LEG PAIN: ICD-10-CM

## 2025-02-12 DIAGNOSIS — F17.200 TOBACCO DEPENDENCE SYNDROME: ICD-10-CM

## 2025-02-12 DIAGNOSIS — Z87.891 PERSONAL HISTORY OF TOBACCO USE, PRESENTING HAZARDS TO HEALTH: ICD-10-CM

## 2025-02-12 DIAGNOSIS — M43.10 ANTEROLISTHESIS: ICD-10-CM

## 2025-02-12 DIAGNOSIS — M54.16 LUMBAR RADICULOPATHY: Primary | ICD-10-CM

## 2025-02-12 PROCEDURE — 99205 OFFICE O/P NEW HI 60 MIN: CPT | Performed by: PHYSICAL MEDICINE & REHABILITATION

## 2025-02-12 PROCEDURE — 72120 X-RAY BEND ONLY L-S SPINE: CPT

## 2025-02-12 PROCEDURE — 99406 BEHAV CHNG SMOKING 3-10 MIN: CPT | Performed by: PHYSICAL MEDICINE & REHABILITATION

## 2025-02-12 RX ORDER — LIDOCAINE 40 MG/G
CREAM TOPICAL
Qty: 45 G | Refills: 1 | Status: SHIPPED | OUTPATIENT
Start: 2025-02-12

## 2025-02-12 RX ORDER — TIZANIDINE 2 MG/1
2 TABLET ORAL EVERY EVENING
Qty: 30 TABLET | Refills: 0 | Status: SHIPPED | OUTPATIENT
Start: 2025-02-12 | End: 2025-03-14

## 2025-02-12 ASSESSMENT — ENCOUNTER SYMPTOMS
SHORTNESS OF BREATH: 0
CONSTIPATION: 0
BACK PAIN: 1
NAUSEA: 0
DIARRHEA: 0

## 2025-02-12 NOTE — PROGRESS NOTES
Bayron Maya  (1955)    2/12/2025    Subjective:     Bayron Maya is 69 y.o. male who complains today of:    Chief Complaint   Patient presents with    Consultation       Bayron Maya is a 69 y.o. male who presents for evaluation by request of Dr. Kingston Love for lumbar degenerative disc disease with discogenic back pain.    He has struggled with pain for over 3 months . He denies any immediately-preceding traumatic or inciting events. He has been previously evaluated by Dr Love whose records are reviewed below. He describes pain located in both sides of his low back with pain down both legs.  Pain is a constant ache and is currently a 6/10 and gets up to a 9/10 at its worst and goes down to a 5/10 at its best on NRS pain scale. Pain is worse with walking. Pain is better with sitting.  Pain is located 50% on the right and 50% on the left. Pain is located 60% in the back and 40% in the legs.    He denies any numbness, tingling, weakness, bowel or bladder dysfunction, saddle anesthesia, falls, history of cancer, unexplained weight loss, persistent night pain and sweats, fever, IV drug abuse, immunocompromise, chronic prednisone or antibiotic use, or any other red flag symptoms. Mood is good, denies any suicidal or homicidal ideation. Sleep is good, awakes refreshed.    He has tried:  Home exercise program with minimal relief  Bilateral shoulder and knee replacements Dr Toure and Dr Owen     Diagnostic testing previously performed includes XRs     Medications tried include:  Acetaminophen with minimal relief for over 3 months  Ibuprofen with minimal relief for over 3 months  Gabapentin 300 mg   Steroid Medrol dose pack 2 days of relief    Allergies, Medications, Past Medical History, Family History, Social History, Work History, and Review of Systems reviewed below    +CAD s/p CABG with stents on Aspirin 81 mg  +Kidney disease    No Seizures, Epilepsy or Brain Surgery

## 2025-02-12 NOTE — TELEPHONE ENCOUNTER
CLINICAL PHARMACY NOTE - Population [facility] Pharmacy Referral    Patient can be scheduled with:  Team Schedule- General Referrals, etc.    Received a referral from: Provider: Clyde Hernandez MD   to discuss patient’s medications and smoking cessation. Called patient to schedule a time to speak with a pharmacist over the telephone.     No answer left VM: Please contact us at  [phone] option 1 to schedule this appointment.    Meena Judge CP.   [facility] Clinical   [facility]  Toll free: [phone] Option 1

## 2025-02-13 ENCOUNTER — TELEPHONE (OUTPATIENT)
Age: 70
End: 2025-02-13

## 2025-02-13 NOTE — TELEPHONE ENCOUNTER
GONZÁLEZ L4/5 TRANSFORAMINAL     AUTH APPROVED FROM 3/6/25-3/19/25  OK to schedule procedure approved as above.   Please note sides/levels approved and date range.   (If applicable, sides/levels approved may differ from those ordered)    TO BE SCHEDULED WITH

## 2025-02-14 NOTE — TELEPHONE ENCOUNTER
Reached patient however declined telephone appointment with Clinical Pharmacist.  Hasn't chewed or snuffed yesterday and today and would like to try on his own.  Offered pharmaceutic assistance but declined again.    Meena Judge CPhT.   Population Health Clinical   [facility] Clinical [facility]  Toll free: [phone] Option 1       For Pharmacy Admin Tracking Only    Program: [employer]  CPA in place:  No  Recommendation Provided To: Patient/Caregiver: 1 via Telephone  Intervention Accepted By: Patient/Caregiver: 0  Gap Closed?: No   Time Spent (min): 10

## 2025-02-17 ENCOUNTER — PROCEDURE VISIT (OUTPATIENT)
Age: 70
End: 2025-02-17
Payer: MEDICARE

## 2025-02-17 DIAGNOSIS — M79.604 BILATERAL LEG PAIN: Primary | ICD-10-CM

## 2025-02-17 DIAGNOSIS — M79.605 BILATERAL LEG PAIN: Primary | ICD-10-CM

## 2025-02-17 PROCEDURE — 95911 NRV CNDJ TEST 9-10 STUDIES: CPT | Performed by: PHYSICAL MEDICINE & REHABILITATION

## 2025-02-17 PROCEDURE — 95886 MUSC TEST DONE W/N TEST COMP: CPT | Performed by: PHYSICAL MEDICINE & REHABILITATION

## 2025-02-17 PROCEDURE — 99213 OFFICE O/P EST LOW 20 MIN: CPT | Performed by: PHYSICAL MEDICINE & REHABILITATION

## 2025-02-17 NOTE — PROGRESS NOTES
limited, there is no clear evidence for a generalized large fiber sensorimotor peripheral polyneuropathy.     RECOMMENDATIONS: Today's study does not explain the patient's bilateral leg pain. The patient should follow up as previously instructed. If his symptoms persist or worsen, further electrodiagnostic evaluation may be considered if the patient is agreeable. Clinical correlation is recommended.

## 2025-02-17 NOTE — TELEPHONE ENCOUNTER
Letter faxed to Dr Waqar Deluca to stop ASA. Will wait for response to call to schedule procedure.

## 2025-02-24 ENCOUNTER — TELEPHONE (OUTPATIENT)
Dept: CARDIOLOGY | Facility: CLINIC | Age: 70
End: 2025-02-24
Payer: MEDICARE

## 2025-02-24 NOTE — TELEPHONE ENCOUNTER
Received preop clearance form for patient pending pain injection with Dr. La MD at [facility] in near future.     Asking to hold ASA x7 days pre procedure.     Patient last seen with Dr. Deborah Oates MD [facility] on 10/15/24:  Alireza Bellamy is a 69 y.o. year old male patient doing well from a cardiac standpoint no complaint no symptoms of chest pain or shortness of breath.  Denies symptoms of palpitations syncope or presyncope.  I discussed with the patient in length we will continue medication will call for any problem and follow-up as scheduled.     Last [facility] done 10/2023:   1. Circumflex Coronary Artery: significantly obstructed.   2. OM 1 CX Lesion: The percent stenosis is 80%.   3. OM 1 CX Lesion: Resolute [place] 2.75x22, Resolute [place] 2.25x8 post-dilation: 0% residual stenosis. OM 1 CX: pre-procedure DANE flow was 3(complete perfusion) and post-procedure DANE flow was 3(complete perfusion).   4. SVG to the 1st Marginal: Percent stenosis is 80%.   5. SVG to the 1st Marginal Lesion: Percent stenosis is 90%.   6. SVG to the 1st Marginal Lesion: Resolute [place] 3.00x15: 0% residual stenosis.   7. SVG to the 1st Marginal Lesion: pre-procedure DANE flow was 3(complete perfusion) and post-procedure DANE flow was 3(complete perfusion).    Form placed for Dr. Deborah Oates MD [facility] to review tomorrow in clinic.

## 2025-02-26 NOTE — TELEPHONE ENCOUNTER
Per Dr. Deborah Oates MD [facility], patient cleared for procedure as requested.   Patient to hold ASA x7 days pre procedure.   Form signed and faxed with confirmation received. Placed to scanning.     LM for patient with instructions noted. Advised return call with questions.

## 2025-02-28 NOTE — TELEPHONE ENCOUNTER
Received permission from Dr Deluca for patient to hold ASA (7 DAYS).    Please call patient to schedule

## 2025-03-03 ENCOUNTER — HOSPITAL ENCOUNTER (OUTPATIENT)
Dept: MRI IMAGING | Age: 70
Discharge: HOME | End: 2025-03-05
Attending: PHYSICAL MEDICINE & REHABILITATION
Payer: MEDICARE

## 2025-03-03 DIAGNOSIS — M79.605 BILATERAL LEG PAIN: ICD-10-CM

## 2025-03-03 DIAGNOSIS — M54.16 LUMBAR RADICULOPATHY: ICD-10-CM

## 2025-03-03 DIAGNOSIS — M79.604 BILATERAL LEG PAIN: ICD-10-CM

## 2025-03-03 PROCEDURE — 72148 MRI LUMBAR SPINE W/O DYE: CPT

## 2025-03-05 ENCOUNTER — TELEPHONE (OUTPATIENT)
Age: 70
End: 2025-03-05

## 2025-03-05 NOTE — TELEPHONE ENCOUNTER
Recommend Neurosurgery evaluation for severe L3/4 L4/5 spinal canal stenosis per MRI LS Spine 3/3/25.

## 2025-03-07 NOTE — TELEPHONE ENCOUNTER
Please inform patient of Dr Hernandez's message regarding MRI RESULTS:     Results reviewed, okay to share result report with patient. No findings that require emergent evaluation or treatment. L2/3 shows severe left and up to severe right foramen narrowing, L3/4 up to severe spinal canal and foraminal stenosis, L4/5 severe spinal canal stenosis and severe bilateral foramen stenosis, L5/S1 severe bilateral foramen stenosis. Recommend Neurosurgery evaluation for multilevel spinal canal stenosis. Call 911 or go to ER for any weakness, bowel or bladder dysfunction, numbness in the groin, or any other concerning symptoms.            PLEASE SCHEDULE NEUROSURGERY EVAL PER DR HERNANDEZ

## 2025-03-11 ENCOUNTER — PROCEDURE VISIT (OUTPATIENT)
Age: 70
End: 2025-03-11
Payer: MEDICARE

## 2025-03-11 VITALS
WEIGHT: 218 LBS | HEIGHT: 68 IN | OXYGEN SATURATION: 97 % | BODY MASS INDEX: 33.04 KG/M2 | HEART RATE: 62 BPM | SYSTOLIC BLOOD PRESSURE: 122 MMHG | TEMPERATURE: 98 F | DIASTOLIC BLOOD PRESSURE: 72 MMHG

## 2025-03-11 DIAGNOSIS — M54.16 LUMBAR RADICULOPATHY: Primary | ICD-10-CM

## 2025-03-11 PROCEDURE — 64483 NJX AA&/STRD TFRM EPI L/S 1: CPT | Performed by: PHYSICAL MEDICINE & REHABILITATION

## 2025-03-11 RX ORDER — DEXAMETHASONE SODIUM PHOSPHATE 10 MG/ML
10 INJECTION, SOLUTION INTRAMUSCULAR; INTRAVENOUS ONCE
Status: COMPLETED | OUTPATIENT
Start: 2025-03-11 | End: 2025-03-11

## 2025-03-11 RX ORDER — LIDOCAINE HYDROCHLORIDE 10 MG/ML
6 INJECTION, SOLUTION EPIDURAL; INFILTRATION; INTRACAUDAL; PERINEURAL ONCE
Status: COMPLETED | OUTPATIENT
Start: 2025-03-11 | End: 2025-03-11

## 2025-03-11 RX ORDER — SODIUM CHLORIDE 9 MG/ML
3 INJECTION, SOLUTION INTRAMUSCULAR; INTRAVENOUS; SUBCUTANEOUS ONCE
Status: COMPLETED | OUTPATIENT
Start: 2025-03-11 | End: 2025-03-11

## 2025-03-11 RX ADMIN — SODIUM CHLORIDE 3 ML: 9 INJECTION, SOLUTION INTRAMUSCULAR; INTRAVENOUS; SUBCUTANEOUS at 12:58

## 2025-03-11 RX ADMIN — DEXAMETHASONE SODIUM PHOSPHATE 10 MG: 10 INJECTION, SOLUTION INTRAMUSCULAR; INTRAVENOUS at 12:58

## 2025-03-11 RX ADMIN — LIDOCAINE HYDROCHLORIDE 6 ML: 10 INJECTION, SOLUTION EPIDURAL; INFILTRATION; INTRACAUDAL; PERINEURAL at 12:58

## 2025-03-11 RX ADMIN — Medication 1 MEQ: at 12:59

## 2025-03-11 ASSESSMENT — PAIN SCALES - GENERAL
PAINLEVEL_OUTOF10: 7
PAINLEVEL_OUTOF10: 7

## 2025-03-11 ASSESSMENT — PAIN DESCRIPTION - LOCATION: LOCATION: BACK

## 2025-03-11 NOTE — PROGRESS NOTES
Lumbar Transforaminal Epidural Steroid Injection (TFESI)    Patient Name: Bayron Maya   : 1955  Date: 3/11/2025     Physician: Clyde Hernandez MD     INDICATIONS: Bayron Maya is a 69 y.o. male who presents with symptoms and physical exam findings consistent with lumbar radiculopathy. He has lumbosacral paresthesias with a positive straight leg raise on physical exam. He has had persistent pain that limits his activities of daily living. The pain is persistent despite conservative measures. He has significant functional and psychological impairment due to this condition. Given his symptoms, physical exam findings, impairment in activities of daily living, and lack of response to conservative measures, consideration for lumbar transforaminal epidural corticosteroid injection was given. Discussed the risks including but not limited to bleeding, infection, worsened pain, damage to surrounding structures, side effects, toxicity, allergic reactions to medications used, need for surgery, headache, vision changes, difficulty with chewing and/or swallowing, immune and stress-response dysfunction, fat necrosis, skin pigmentation changes, blood sugar elevation, as well as catastrophic injury such as vision loss/blindness, paralysis, spinal cord or plexus injury, cerebral brainstem or spinal cord infarction, intrathecal injection, spinal cord puncture, arachnoiditis, discitis, stroke, bowel/bladder/sexual dysfunction, ventilator dependence, loss of use of the arms and/or legs, and death. Discussed off-label use of corticosteroids and how the Food and Drug Administration (FDA) has not approved corticosteroids for epidural use. Discussed the risks, benefits, alternative procedures, and alternatives to the procedure including no procedure at all. Discussed that we cannot undo any permanent neurologic or orthopaedic damage or change the course of any underlying disease. After thorough discussion, patient

## 2025-03-18 ENCOUNTER — INITIAL CONSULT (OUTPATIENT)
Age: 70
End: 2025-03-18
Payer: MEDICARE

## 2025-03-18 VITALS
BODY MASS INDEX: 33.04 KG/M2 | SYSTOLIC BLOOD PRESSURE: 122 MMHG | WEIGHT: 218 LBS | HEIGHT: 68 IN | DIASTOLIC BLOOD PRESSURE: 74 MMHG

## 2025-03-18 DIAGNOSIS — M48.062 SPINAL STENOSIS OF LUMBAR REGION WITH NEUROGENIC CLAUDICATION: Primary | ICD-10-CM

## 2025-03-18 PROCEDURE — 1125F AMNT PAIN NOTED PAIN PRSNT: CPT

## 2025-03-18 PROCEDURE — 1123F ACP DISCUSS/DSCN MKR DOCD: CPT

## 2025-03-18 PROCEDURE — 99204 OFFICE O/P NEW MOD 45 MIN: CPT

## 2025-03-18 PROCEDURE — 3078F DIAST BP <80 MM HG: CPT

## 2025-03-18 PROCEDURE — 3074F SYST BP LT 130 MM HG: CPT

## 2025-03-18 ASSESSMENT — ENCOUNTER SYMPTOMS
NAUSEA: 0
BACK PAIN: 1
DIARRHEA: 0
SHORTNESS OF BREATH: 0
CONSTIPATION: 0

## 2025-03-24 ENCOUNTER — APPOINTMENT (OUTPATIENT)
Dept: RADIOLOGY | Facility: HOSPITAL | Age: 70
End: 2025-03-24
Payer: MEDICARE

## 2025-03-24 ENCOUNTER — APPOINTMENT (OUTPATIENT)
Dept: CARDIOLOGY | Facility: HOSPITAL | Age: 70
End: 2025-03-24
Payer: MEDICARE

## 2025-03-24 ENCOUNTER — HOSPITAL ENCOUNTER (INPATIENT)
Facility: HOSPITAL | Age: 70
LOS: 2 days | Discharge: HOME HEALTH | End: 2025-03-27
Attending: EMERGENCY MEDICINE | Admitting: STUDENT IN AN ORGANIZED HEALTH CARE EDUCATION/TRAINING PROGRAM
Payer: MEDICARE

## 2025-03-24 DIAGNOSIS — I63.9 ACUTE CVA (CEREBROVASCULAR ACCIDENT) (MULTI): ICD-10-CM

## 2025-03-24 DIAGNOSIS — I67.848 OTHER CEREBROVASCULAR VASOSPASM AND VASOCONSTRICTION: ICD-10-CM

## 2025-03-24 DIAGNOSIS — G45.9 TIA (TRANSIENT ISCHEMIC ATTACK): ICD-10-CM

## 2025-03-24 DIAGNOSIS — R47.01 EXPRESSIVE APHASIA: Primary | ICD-10-CM

## 2025-03-24 LAB
ALBUMIN SERPL BCP-MCNC: 4.2 G/DL (ref 3.4–5)
ALP SERPL-CCNC: 88 U/L (ref 33–136)
ALT SERPL W P-5'-P-CCNC: 17 U/L (ref 10–52)
ANION GAP SERPL CALC-SCNC: 12 MMOL/L (ref 10–20)
APTT PPP: 29 SECONDS (ref 26–36)
AST SERPL W P-5'-P-CCNC: 15 U/L (ref 9–39)
BASOPHILS # BLD AUTO: 0.07 X10*3/UL (ref 0–0.1)
BASOPHILS NFR BLD AUTO: 0.6 %
BILIRUB SERPL-MCNC: 1.3 MG/DL (ref 0–1.2)
BUN SERPL-MCNC: 32 MG/DL (ref 6–23)
CALCIUM SERPL-MCNC: 9.1 MG/DL (ref 8.6–10.3)
CARDIAC TROPONIN I PNL SERPL HS: 23 NG/L (ref 0–20)
CHLORIDE SERPL-SCNC: 104 MMOL/L (ref 98–107)
CO2 SERPL-SCNC: 26 MMOL/L (ref 21–32)
CREAT SERPL-MCNC: 1.68 MG/DL (ref 0.5–1.3)
EGFRCR SERPLBLD CKD-EPI 2021: 43 ML/MIN/1.73M*2
EOSINOPHIL # BLD AUTO: 0.58 X10*3/UL (ref 0–0.4)
EOSINOPHIL NFR BLD AUTO: 5.1 %
ERYTHROCYTE [DISTWIDTH] IN BLOOD BY AUTOMATED COUNT: 14.2 % (ref 11.5–14.5)
GLUCOSE BLD MANUAL STRIP-MCNC: 119 MG/DL (ref 74–99)
GLUCOSE SERPL-MCNC: 98 MG/DL (ref 74–99)
HCT VFR BLD AUTO: 42.5 % (ref 41–52)
HGB BLD-MCNC: 14.4 G/DL (ref 13.5–17.5)
IMM GRANULOCYTES # BLD AUTO: 0.05 X10*3/UL (ref 0–0.5)
IMM GRANULOCYTES NFR BLD AUTO: 0.4 % (ref 0–0.9)
INR PPP: 1.1 (ref 0.9–1.1)
LYMPHOCYTES # BLD AUTO: 2.37 X10*3/UL (ref 0.8–3)
LYMPHOCYTES NFR BLD AUTO: 20.8 %
MCH RBC QN AUTO: 32.1 PG (ref 26–34)
MCHC RBC AUTO-ENTMCNC: 33.9 G/DL (ref 32–36)
MCV RBC AUTO: 95 FL (ref 80–100)
MONOCYTES # BLD AUTO: 1.12 X10*3/UL (ref 0.05–0.8)
MONOCYTES NFR BLD AUTO: 9.8 %
NEUTROPHILS # BLD AUTO: 7.2 X10*3/UL (ref 1.6–5.5)
NEUTROPHILS NFR BLD AUTO: 63.3 %
NRBC BLD-RTO: 0 /100 WBCS (ref 0–0)
PLATELET # BLD AUTO: 268 X10*3/UL (ref 150–450)
POTASSIUM SERPL-SCNC: 4 MMOL/L (ref 3.5–5.3)
PROT SERPL-MCNC: 7.2 G/DL (ref 6.4–8.2)
PROTHROMBIN TIME: 11.7 SECONDS (ref 9.8–12.4)
RBC # BLD AUTO: 4.48 X10*6/UL (ref 4.5–5.9)
SODIUM SERPL-SCNC: 138 MMOL/L (ref 136–145)
WBC # BLD AUTO: 11.4 X10*3/UL (ref 4.4–11.3)

## 2025-03-24 PROCEDURE — 85730 THROMBOPLASTIN TIME PARTIAL: CPT | Performed by: EMERGENCY MEDICINE

## 2025-03-24 PROCEDURE — 80053 COMPREHEN METABOLIC PANEL: CPT | Performed by: EMERGENCY MEDICINE

## 2025-03-24 PROCEDURE — 83036 HEMOGLOBIN GLYCOSYLATED A1C: CPT | Mod: ELYLAB | Performed by: NURSE PRACTITIONER

## 2025-03-24 PROCEDURE — 80061 LIPID PANEL: CPT | Performed by: NURSE PRACTITIONER

## 2025-03-24 PROCEDURE — 70450 CT HEAD/BRAIN W/O DYE: CPT

## 2025-03-24 PROCEDURE — 84484 ASSAY OF TROPONIN QUANT: CPT | Performed by: EMERGENCY MEDICINE

## 2025-03-24 PROCEDURE — 70450 CT HEAD/BRAIN W/O DYE: CPT | Performed by: RADIOLOGY

## 2025-03-24 PROCEDURE — 36415 COLL VENOUS BLD VENIPUNCTURE: CPT | Performed by: EMERGENCY MEDICINE

## 2025-03-24 PROCEDURE — G0378 HOSPITAL OBSERVATION PER HR: HCPCS

## 2025-03-24 PROCEDURE — 70496 CT ANGIOGRAPHY HEAD: CPT | Performed by: RADIOLOGY

## 2025-03-24 PROCEDURE — 70496 CT ANGIOGRAPHY HEAD: CPT

## 2025-03-24 PROCEDURE — 82947 ASSAY GLUCOSE BLOOD QUANT: CPT

## 2025-03-24 PROCEDURE — 99223 1ST HOSP IP/OBS HIGH 75: CPT | Performed by: NURSE PRACTITIONER

## 2025-03-24 PROCEDURE — 99291 CRITICAL CARE FIRST HOUR: CPT | Mod: 25 | Performed by: EMERGENCY MEDICINE

## 2025-03-24 PROCEDURE — 85610 PROTHROMBIN TIME: CPT | Performed by: EMERGENCY MEDICINE

## 2025-03-24 PROCEDURE — 93005 ELECTROCARDIOGRAM TRACING: CPT

## 2025-03-24 PROCEDURE — 2550000001 HC RX 255 CONTRASTS: Performed by: EMERGENCY MEDICINE

## 2025-03-24 PROCEDURE — G0427 INPT/ED TELECONSULT70: HCPCS | Performed by: PSYCHIATRY & NEUROLOGY

## 2025-03-24 PROCEDURE — 85025 COMPLETE CBC W/AUTO DIFF WBC: CPT | Performed by: EMERGENCY MEDICINE

## 2025-03-24 PROCEDURE — 83880 ASSAY OF NATRIURETIC PEPTIDE: CPT | Performed by: NURSE PRACTITIONER

## 2025-03-24 PROCEDURE — 70498 CT ANGIOGRAPHY NECK: CPT | Performed by: RADIOLOGY

## 2025-03-24 RX ADMIN — IOHEXOL 75 ML: 350 INJECTION, SOLUTION INTRAVENOUS at 22:21

## 2025-03-24 ASSESSMENT — ENCOUNTER SYMPTOMS
VOMITING: 0
FREQUENCY: 0
DYSURIA: 0
DIARRHEA: 0
NAUSEA: 0
ABDOMINAL PAIN: 0
SHORTNESS OF BREATH: 0
CONSTIPATION: 0
FEVER: 0
FLANK PAIN: 0
FACIAL ASYMMETRY: 1
CHILLS: 0
SPEECH DIFFICULTY: 1
HEMATURIA: 0
PALPITATIONS: 0

## 2025-03-24 ASSESSMENT — PAIN - FUNCTIONAL ASSESSMENT: PAIN_FUNCTIONAL_ASSESSMENT: UNABLE TO SELF-REPORT

## 2025-03-25 ENCOUNTER — APPOINTMENT (OUTPATIENT)
Dept: RADIOLOGY | Facility: HOSPITAL | Age: 70
End: 2025-03-25
Payer: MEDICARE

## 2025-03-25 ENCOUNTER — APPOINTMENT (OUTPATIENT)
Dept: CARDIOLOGY | Facility: HOSPITAL | Age: 70
End: 2025-03-25
Payer: MEDICARE

## 2025-03-25 PROBLEM — R47.01 EXPRESSIVE APHASIA: Status: ACTIVE | Noted: 2025-03-25

## 2025-03-25 PROBLEM — I63.9 ACUTE CVA (CEREBROVASCULAR ACCIDENT) (MULTI): Status: ACTIVE | Noted: 2025-03-24

## 2025-03-25 LAB
AORTIC VALVE MEAN GRADIENT: 3 MMHG
AORTIC VALVE PEAK VELOCITY: 1.13 M/S
ATRIAL RATE: 60 BPM
AV PEAK GRADIENT: 5 MMHG
AVA (PEAK VEL): 2.58 CM2
AVA (VTI): 2.81 CM2
BNP SERPL-MCNC: 180 PG/ML (ref 0–99)
CHOLEST SERPL-MCNC: 114 MG/DL (ref 0–199)
CHOLESTEROL/HDL RATIO: 4
EJECTION FRACTION APICAL 4 CHAMBER: 41.3
EJECTION FRACTION: 35 %
EST. AVERAGE GLUCOSE BLD GHB EST-MCNC: 134 MG/DL
HBA1C MFR BLD: 6.3 %
HDLC SERPL-MCNC: 28.6 MG/DL
HOLD SPECIMEN: NORMAL
LDLC SERPL CALC-MCNC: 38 MG/DL
LEFT ATRIUM VOLUME AREA LENGTH INDEX BSA: 50.1 ML/M2
LEFT VENTRICLE INTERNAL DIMENSION DIASTOLE: 5.34 CM (ref 3.5–6)
LEFT VENTRICULAR OUTFLOW TRACT DIAMETER: 2.1 CM
LV EJECTION FRACTION BIPLANE: 42 %
MITRAL VALVE E/A RATIO: 1.01
NON HDL CHOLESTEROL: 85 MG/DL (ref 0–149)
P AXIS: 12 DEGREES
P OFFSET: 160 MS
P ONSET: 109 MS
PR INTERVAL: 194 MS
Q ONSET: 206 MS
QRS COUNT: 10 BEATS
QRS DURATION: 104 MS
QT INTERVAL: 408 MS
QTC CALCULATION(BAZETT): 408 MS
QTC FREDERICIA: 408 MS
R AXIS: -49 DEGREES
RIGHT VENTRICLE FREE WALL PEAK S': 12.9 CM/S
RIGHT VENTRICLE PEAK SYSTOLIC PRESSURE: 29.8 MMHG
T AXIS: 91 DEGREES
T OFFSET: 410 MS
TRICUSPID ANNULAR PLANE SYSTOLIC EXCURSION: 2.1 CM
TRIGL SERPL-MCNC: 238 MG/DL (ref 0–149)
VENTRICULAR RATE: 60 BPM
VLDL: 48 MG/DL (ref 0–40)

## 2025-03-25 PROCEDURE — 70544 MR ANGIOGRAPHY HEAD W/O DYE: CPT

## 2025-03-25 PROCEDURE — 2500000001 HC RX 250 WO HCPCS SELF ADMINISTERED DRUGS (ALT 637 FOR MEDICARE OP): Performed by: STUDENT IN AN ORGANIZED HEALTH CARE EDUCATION/TRAINING PROGRAM

## 2025-03-25 PROCEDURE — 70547 MR ANGIOGRAPHY NECK W/O DYE: CPT

## 2025-03-25 PROCEDURE — 99232 SBSQ HOSP IP/OBS MODERATE 35: CPT | Performed by: STUDENT IN AN ORGANIZED HEALTH CARE EDUCATION/TRAINING PROGRAM

## 2025-03-25 PROCEDURE — 70544 MR ANGIOGRAPHY HEAD W/O DYE: CPT | Performed by: RADIOLOGY

## 2025-03-25 PROCEDURE — 70547 MR ANGIOGRAPHY NECK W/O DYE: CPT | Performed by: RADIOLOGY

## 2025-03-25 PROCEDURE — 1200000002 HC GENERAL ROOM WITH TELEMETRY DAILY

## 2025-03-25 PROCEDURE — 2500000004 HC RX 250 GENERAL PHARMACY W/ HCPCS (ALT 636 FOR OP/ED): Performed by: NURSE PRACTITIONER

## 2025-03-25 PROCEDURE — 96372 THER/PROPH/DIAG INJ SC/IM: CPT | Performed by: NURSE PRACTITIONER

## 2025-03-25 PROCEDURE — C8929 TTE W OR WO FOL WCON,DOPPLER: HCPCS

## 2025-03-25 PROCEDURE — 2500000001 HC RX 250 WO HCPCS SELF ADMINISTERED DRUGS (ALT 637 FOR MEDICARE OP): Performed by: NURSE PRACTITIONER

## 2025-03-25 PROCEDURE — 70551 MRI BRAIN STEM W/O DYE: CPT

## 2025-03-25 PROCEDURE — 99255 IP/OBS CONSLTJ NEW/EST HI 80: CPT | Performed by: STUDENT IN AN ORGANIZED HEALTH CARE EDUCATION/TRAINING PROGRAM

## 2025-03-25 PROCEDURE — 93306 TTE W/DOPPLER COMPLETE: CPT | Performed by: INTERNAL MEDICINE

## 2025-03-25 PROCEDURE — 70551 MRI BRAIN STEM W/O DYE: CPT | Performed by: RADIOLOGY

## 2025-03-25 RX ORDER — LOSARTAN POTASSIUM 25 MG/1
25 TABLET ORAL DAILY
Status: DISCONTINUED | OUTPATIENT
Start: 2025-03-26 | End: 2025-03-27 | Stop reason: HOSPADM

## 2025-03-25 RX ORDER — NITROGLYCERIN 0.4 MG/1
0.4 TABLET SUBLINGUAL EVERY 5 MIN PRN
Status: DISCONTINUED | OUTPATIENT
Start: 2025-03-25 | End: 2025-03-27 | Stop reason: HOSPADM

## 2025-03-25 RX ORDER — ISOSORBIDE MONONITRATE 30 MG/1
30 TABLET, EXTENDED RELEASE ORAL DAILY
Status: DISCONTINUED | OUTPATIENT
Start: 2025-03-26 | End: 2025-03-27 | Stop reason: HOSPADM

## 2025-03-25 RX ORDER — CLOPIDOGREL BISULFATE 75 MG/1
75 TABLET ORAL DAILY
Status: DISCONTINUED | OUTPATIENT
Start: 2025-03-26 | End: 2025-03-27 | Stop reason: HOSPADM

## 2025-03-25 RX ORDER — ATORVASTATIN CALCIUM 80 MG/1
80 TABLET, FILM COATED ORAL NIGHTLY
Status: DISCONTINUED | OUTPATIENT
Start: 2025-03-25 | End: 2025-03-27 | Stop reason: HOSPADM

## 2025-03-25 RX ORDER — ENOXAPARIN SODIUM 100 MG/ML
40 INJECTION SUBCUTANEOUS
Status: DISCONTINUED | OUTPATIENT
Start: 2025-03-25 | End: 2025-03-25

## 2025-03-25 RX ORDER — CARVEDILOL 12.5 MG/1
12.5 TABLET ORAL
Status: DISCONTINUED | OUTPATIENT
Start: 2025-03-25 | End: 2025-03-27 | Stop reason: HOSPADM

## 2025-03-25 RX ORDER — CLOPIDOGREL BISULFATE 300 MG/1
300 TABLET, FILM COATED ORAL ONCE
Status: COMPLETED | OUTPATIENT
Start: 2025-03-25 | End: 2025-03-25

## 2025-03-25 RX ORDER — HEPARIN SODIUM 5000 [USP'U]/ML
5000 INJECTION, SOLUTION INTRAVENOUS; SUBCUTANEOUS EVERY 8 HOURS
Status: DISCONTINUED | OUTPATIENT
Start: 2025-03-25 | End: 2025-03-26

## 2025-03-25 RX ORDER — NAPROXEN SODIUM 220 MG/1
81 TABLET, FILM COATED ORAL DAILY
Status: DISCONTINUED | OUTPATIENT
Start: 2025-03-25 | End: 2025-03-26

## 2025-03-25 RX ADMIN — CARVEDILOL 12.5 MG: 12.5 TABLET, FILM COATED ORAL at 17:11

## 2025-03-25 RX ADMIN — PERFLUTREN 2 ML OF DILUTION: 6.52 INJECTION, SUSPENSION INTRAVENOUS at 11:17

## 2025-03-25 RX ADMIN — HEPARIN SODIUM 5000 UNITS: 5000 INJECTION INTRAVENOUS; SUBCUTANEOUS at 17:11

## 2025-03-25 RX ADMIN — CLOPIDOGREL BISULFATE 300 MG: 300 TABLET, FILM COATED ORAL at 12:35

## 2025-03-25 RX ADMIN — HEPARIN SODIUM 5000 UNITS: 5000 INJECTION INTRAVENOUS; SUBCUTANEOUS at 01:32

## 2025-03-25 RX ADMIN — ATORVASTATIN CALCIUM 80 MG: 80 TABLET, FILM COATED ORAL at 20:10

## 2025-03-25 RX ADMIN — HEPARIN SODIUM 5000 UNITS: 5000 INJECTION INTRAVENOUS; SUBCUTANEOUS at 09:35

## 2025-03-25 RX ADMIN — ATORVASTATIN CALCIUM 80 MG: 80 TABLET, FILM COATED ORAL at 01:32

## 2025-03-25 RX ADMIN — ASPIRIN 81 MG: 81 TABLET, CHEWABLE ORAL at 01:32

## 2025-03-25 SDOH — SOCIAL STABILITY: SOCIAL INSECURITY: WITHIN THE LAST YEAR, HAVE YOU BEEN HUMILIATED OR EMOTIONALLY ABUSED IN OTHER WAYS BY YOUR PARTNER OR EX-PARTNER?: NO

## 2025-03-25 SDOH — SOCIAL STABILITY: SOCIAL INSECURITY: WERE YOU ABLE TO COMPLETE ALL THE BEHAVIORAL HEALTH SCREENINGS?: YES

## 2025-03-25 SDOH — ECONOMIC STABILITY: FOOD INSECURITY: WITHIN THE PAST 12 MONTHS, THE FOOD YOU BOUGHT JUST DIDN'T LAST AND YOU DIDN'T HAVE MONEY TO GET MORE.: NEVER TRUE

## 2025-03-25 SDOH — SOCIAL STABILITY: SOCIAL INSECURITY
WITHIN THE LAST YEAR, HAVE YOU BEEN KICKED, HIT, SLAPPED, OR OTHERWISE PHYSICALLY HURT BY YOUR PARTNER OR EX-PARTNER?: NO

## 2025-03-25 SDOH — SOCIAL STABILITY: SOCIAL INSECURITY: HAVE YOU HAD ANY THOUGHTS OF HARMING ANYONE ELSE?: NO

## 2025-03-25 SDOH — ECONOMIC STABILITY: INCOME INSECURITY: IN THE PAST 12 MONTHS HAS THE ELECTRIC, GAS, OIL, OR WATER COMPANY THREATENED TO SHUT OFF SERVICES IN YOUR HOME?: NO

## 2025-03-25 SDOH — ECONOMIC STABILITY: FOOD INSECURITY: WITHIN THE PAST 12 MONTHS, YOU WORRIED THAT YOUR FOOD WOULD RUN OUT BEFORE YOU GOT THE MONEY TO BUY MORE.: NEVER TRUE

## 2025-03-25 SDOH — SOCIAL STABILITY: SOCIAL INSECURITY: HAS ANYONE EVER THREATENED TO HURT YOUR FAMILY OR YOUR PETS?: NO

## 2025-03-25 SDOH — SOCIAL STABILITY: SOCIAL INSECURITY: ARE YOU OR HAVE YOU BEEN THREATENED OR ABUSED PHYSICALLY, EMOTIONALLY, OR SEXUALLY BY ANYONE?: NO

## 2025-03-25 SDOH — SOCIAL STABILITY: SOCIAL INSECURITY: WITHIN THE LAST YEAR, HAVE YOU BEEN AFRAID OF YOUR PARTNER OR EX-PARTNER?: NO

## 2025-03-25 SDOH — SOCIAL STABILITY: SOCIAL INSECURITY: DO YOU FEEL ANYONE HAS EXPLOITED OR TAKEN ADVANTAGE OF YOU FINANCIALLY OR OF YOUR PERSONAL PROPERTY?: NO

## 2025-03-25 SDOH — SOCIAL STABILITY: SOCIAL INSECURITY
WITHIN THE LAST YEAR, HAVE YOU BEEN RAPED OR FORCED TO HAVE ANY KIND OF SEXUAL ACTIVITY BY YOUR PARTNER OR EX-PARTNER?: NO

## 2025-03-25 SDOH — SOCIAL STABILITY: SOCIAL INSECURITY: DO YOU FEEL UNSAFE GOING BACK TO THE PLACE WHERE YOU ARE LIVING?: NO

## 2025-03-25 SDOH — SOCIAL STABILITY: SOCIAL INSECURITY: ABUSE: ADULT

## 2025-03-25 SDOH — SOCIAL STABILITY: SOCIAL INSECURITY: HAVE YOU HAD THOUGHTS OF HARMING ANYONE ELSE?: NO

## 2025-03-25 SDOH — ECONOMIC STABILITY: FOOD INSECURITY: HOW HARD IS IT FOR YOU TO PAY FOR THE VERY BASICS LIKE FOOD, HOUSING, MEDICAL CARE, AND HEATING?: NOT HARD AT ALL

## 2025-03-25 SDOH — SOCIAL STABILITY: SOCIAL INSECURITY: ARE THERE ANY APPARENT SIGNS OF INJURIES/BEHAVIORS THAT COULD BE RELATED TO ABUSE/NEGLECT?: NO

## 2025-03-25 ASSESSMENT — ACTIVITIES OF DAILY LIVING (ADL)
PATIENT'S MEMORY ADEQUATE TO SAFELY COMPLETE DAILY ACTIVITIES?: YES
HEARING - RIGHT EAR: HEARING AID
DRESSING YOURSELF: INDEPENDENT
JUDGMENT_ADEQUATE_SAFELY_COMPLETE_DAILY_ACTIVITIES: YES
LACK_OF_TRANSPORTATION: NO
FEEDING YOURSELF: INDEPENDENT
HEARING - LEFT EAR: HEARING AID
BATHING: INDEPENDENT
ASSISTIVE_DEVICE: EYEGLASSES;HEARING AID - LEFT;HEARING AID - RIGHT
GROOMING: INDEPENDENT
ADEQUATE_TO_COMPLETE_ADL: YES
TOILETING: INDEPENDENT
LACK_OF_TRANSPORTATION: NO
WALKS IN HOME: INDEPENDENT

## 2025-03-25 ASSESSMENT — COGNITIVE AND FUNCTIONAL STATUS - GENERAL
MOBILITY SCORE: 22
CLIMB 3 TO 5 STEPS WITH RAILING: A LITTLE
WALKING IN HOSPITAL ROOM: A LITTLE
DAILY ACTIVITIY SCORE: 24
WALKING IN HOSPITAL ROOM: A LITTLE
CLIMB 3 TO 5 STEPS WITH RAILING: A LITTLE
MOBILITY SCORE: 22
MOBILITY SCORE: 22
DAILY ACTIVITIY SCORE: 24
WALKING IN HOSPITAL ROOM: A LITTLE
CLIMB 3 TO 5 STEPS WITH RAILING: A LITTLE
PATIENT BASELINE BEDBOUND: NO
DAILY ACTIVITIY SCORE: 24

## 2025-03-25 ASSESSMENT — PAIN SCALES - GENERAL
PAINLEVEL_OUTOF10: 0 - NO PAIN

## 2025-03-25 ASSESSMENT — LIFESTYLE VARIABLES
HOW MANY STANDARD DRINKS CONTAINING ALCOHOL DO YOU HAVE ON A TYPICAL DAY: PATIENT DOES NOT DRINK
AUDIT-C TOTAL SCORE: 0
HOW OFTEN DO YOU HAVE 6 OR MORE DRINKS ON ONE OCCASION: NEVER
AUDIT-C TOTAL SCORE: 0
HOW OFTEN DO YOU HAVE A DRINK CONTAINING ALCOHOL: NEVER
SKIP TO QUESTIONS 9-10: 1

## 2025-03-25 ASSESSMENT — PAIN - FUNCTIONAL ASSESSMENT: PAIN_FUNCTIONAL_ASSESSMENT: 0-10

## 2025-03-25 NOTE — CARE PLAN
The patient's goals for the shift include      The clinical goals for the shift include HDS, safety throughout shift

## 2025-03-25 NOTE — PROGRESS NOTES
Alireza Bellamy is a 69 y.o. male on day 0 of admission presenting with Acute CVA (cerebrovascular accident) (Multi).      Subjective   Patient is stable, no acute distress, no complaint, lying on his bed, no headache, no dizziness, speech is fluent      Objective     Last Recorded Vitals  /80   Pulse 58   Temp 36.3 °C (97.3 °F)   Resp 18   Wt 100 kg (221 lb 1.9 oz)   SpO2 96%   Intake/Output last 3 Shifts:  No intake or output data in the 24 hours ending 03/25/25 1201    Admission Weight  Weight: 104 kg (230 lb) (03/24/25 2200)    Daily Weight  03/25/25 : 100 kg (221 lb 1.9 oz)    Image Results  ECG 12 lead  Normal sinus rhythm  Left axis deviation  Abnormal QRS-T angle, consider primary T wave abnormality  Abnormal ECG  When compared with ECG of 13-JUN-2024 22:17,  Questionable change in QRS duration  See ED provider note for full interpretation and clinical correlation  Confirmed by Gisela Jensen ([id number]) on 3/25/2025 10:35:25 AM  MR brain wo IV contrast, MR angio head wo IV contrast, MR angio neck wo IV contrast  Narrative: Interpreted By:  Felipa White,   STUDY:  MR ANGIO HEAD WO IV CONTRAST; MR BRAIN WO IV CONTRAST; MR ANGIO NECK  WO IV CONTRAST      INDICATION:  Signs/Symptoms:CVA      COMPARISON:  Head CT 03/24/2025      ACCESSION NUMBER(S):  [id number]; [id number]; [id number]      ORDERING CLINICIAN:  MARC DUTTON      TECHNIQUE:  Multi-planar multi-sequential MR imaging of the brain was performed  without intravenous contrast. MRA of the head using time-of-flight  technique was performed without intravenous contrast. MRA of the neck  using time-of-flight technique was performed without intravenous  contrast.      FINDINGS:  MRI BRAIN:  Linear areas of diffusion restriction within the cortex and  subcortical white matter of the left parietal lobe (series 7 image 25  through 29). No mass effect or midline shift. No hemorrhagic  conversion.      No intracranial mass. Mild diffuse  parenchymal volume loss.      No hydrocephalus.  No extra-axial fluid collections.      The visualized intraorbital contents are normal. Moderate mucosal  disease of the ethmoid air cells and left sphenoid sinus. The mastoid  air cells are clear. The visualized osseous structures, soft tissues  and partially visualized parotid glands appear normal.      MRA NECK:      Standard configuration of the aortic arch with no gross ostial  stenosis of the great vessels.      The common carotid arteries are patent bilaterally without evidence  of stenosis. There is no narrowing of the cervical internal carotid  arteries bilaterally.      The vertebral arteries are patent bilaterally throughout their course.      MRA HEAD:      Normal distal internal carotid arteries.      The proximal anterior, middle and posterior cerebral arteries are  patent bilaterally.      Normal vertebrobasilar system.      No evidence of vascular stenosis, occlusion, aneurysm or vascular  malformation.      Impression: Multifocal areas of diffusion restriction within the cortex and  subcortical white matter of the left parietal lobe. No hemorrhagic  conversion or mass effect.      Cervical and intracranial vasculature are within normal limits. No  large vessel occlusion, aneurysm, or high-grade stenosis.      _____________  NASCET criteria for internal carotid artery stenosis:  Mild: 0% to 49%  Moderate: 50% to 69%  Severe: 70% to 99%  Complete Occlusion      Signed by: Felipa White 3/25/2025 9:55 AM  Dictation workstation:   [id number]      Physical Exam      General: Well-developed obese elderly male, in no acute distress  HEENT: AT, NC, no JVD, no lymphadenopathy, neck supple  Lungs: Clear, no wheezing, no crackles  Cardiac: Normal S1-S2, no murmur, no gallop  Abdomen: Soft, nontender, no distention, positive bowel sound  Extremities: No deformity, no edema, pulses intact, ROM intact  Neurological: Alert awake oriented x3, sensation intact, clear  speech      Assessment & Plan  Acute CVA (cerebrovascular accident) (Multi)      Alireza Bellamy is a 69 y.o. male with a past medical history of HTN, HLD, CAD, and vertigo who was admitted for the mgmt of following issues:    Left parietal infarct, likely embolic: Presented with slurred speech and left-sided weakness, MRI brain positive for left parietal infarct, likely embolic, neurology recs appreciated, continue with DAPT for 21 days and then aspirin monotherapy, statin, pending echocardiogram, PT/OT evaluation, fall precaution  Patient passed swallow eval at bedside, currently on regular diet  On permissive hypertension for another day  HTN, HLD, CAD: Continue with home meds  VTE prophylaxis: Heparin subcu, SCDs  Disposition: pending PT/OT eval   Family at bedside and all the questions were answered      Jay Jackman MD

## 2025-03-25 NOTE — H&P
"History Of Present Illness  Alireza Bellamy is a 69 y.o. male with a past medical history of HTN, HLD, CAD, and vertigo who presented to the ED with trouble speaking and left-sided weakness. Neuro-stroke was contacted who recommended TNK, however the patient's symptoms improved and the patient and wife declined TNK at this time. On exam, his wife reports his symptoms have greatly improved. The patient denies any fever, chills, chest pain, shortness of breath, palpitation, nausea, or vomiting. He state he \"just doesn't feel right\".      Past Medical History  Past Medical History:   Diagnosis Date    Arthritis     Coronary artery disease     Hearing aid worn     bilateral    Heart disease     HL (hearing loss)     Hyperlipidemia     Hypertension     Joint pain     Nephrolithiasis     PONV (postoperative nausea and vomiting)     Vertigo     Wears glasses        Surgical History  Past Surgical History:   Procedure Laterality Date    ANKLE FRACTURE SURGERY Left     CARDIAC CATHETERIZATION  09/27/2023    4 VALENTINA to Circ    CARDIAC CATHETERIZATION N/A 10/05/2023    Procedure: PCI VALENTINA Stent- Coronary;  Surgeon: Deborah Oates MD;  Location: [facility];  Service: Cardiovascular;  Laterality: N/A;  PCI of the SVG to OM1 and OM2. IV NS 75/hr for 2 hours prior to procedure. BMP on admit.    CARDIAC CATHETERIZATION N/A 10/05/2023    Procedure: Left Heart Cath, No LV;  Surgeon: Deborah Oates MD;  Location: [facility];  Service: Cardiovascular;  Laterality: N/A;    CARPAL TUNNEL RELEASE      COLONOSCOPY      CORONARY ANGIOPLASTY  09/27/2023    4 VALENTINA to Circ    CORONARY ARTERY BYPASS GRAFT  2008    CORONARY STENT PLACEMENT      CYSTOSCOPY      JOINT REPLACEMENT Bilateral     LEG SURGERY Left     left lower leg fracture    LITHOTRIPSY      SHOULDER SURGERY Right 06/2024    SINUS SURGERY      TOTAL KNEE ARTHROPLASTY Bilateral     TOTAL SHOULDER ARTHROPLASTY Left         Social History  He reports that he has never " "smoked. His smokeless tobacco use includes chew. He reports that he does not drink alcohol and does not use drugs.    Family History  Family History   Problem Relation Name Age of Onset    Coronary artery disease Mother      Other (PTCA) Mother      Leukemia Father          Allergies  Patient has no known allergies.    Review of Systems   Constitutional:  Negative for chills and fever.   Respiratory:  Negative for shortness of breath.    Cardiovascular:  Negative for chest pain and palpitations.   Gastrointestinal:  Negative for abdominal pain, constipation, diarrhea, nausea and vomiting.   Genitourinary:  Negative for dysuria, flank pain, frequency, hematuria and urgency.   Neurological:  Positive for facial asymmetry and speech difficulty.   All other systems reviewed and are negative.       Physical Exam  Vitals reviewed.   HENT:      Head: Normocephalic and atraumatic.   Cardiovascular:      Rate and Rhythm: Normal rate and regular rhythm.      Heart sounds: Normal heart sounds.   Pulmonary:      Effort: Pulmonary effort is normal.      Breath sounds: Normal air entry.   Abdominal:      General: Bowel sounds are normal.      Palpations: Abdomen is soft.      Tenderness: There is no abdominal tenderness.   Musculoskeletal:         General: No deformity.   Skin:     General: Skin is warm and dry.   Neurological:      General: No focal deficit present.      Mental Status: He is alert and oriented to person, place, and time.      Cranial Nerves: Facial asymmetry present.      Comments: Mild left facial droop  No tongue deviation  Very mild left sided hand/arm weakness   Psychiatric:         Mood and Affect: Mood normal.         Behavior: Behavior normal.          Last Recorded Vitals  Blood pressure 178/86, pulse 60, temperature 37 °C (98.6 °F), temperature source Temporal, resp. rate 15, height 1.702 m (5' 7\"), weight 104 kg (230 lb), SpO2 95%.    Relevant Results      Lab Results   Component Value Date    WBC 11.4 " (H) 03/24/2025    HGB 14.4 03/24/2025    HCT 42.5 03/24/2025    MCV 95 03/24/2025     03/24/2025     Lab Results   Component Value Date    GLUCOSE 98 03/24/2025    CALCIUM 9.1 03/24/2025     03/24/2025    K 4.0 03/24/2025    CO2 26 03/24/2025     03/24/2025    BUN 32 (H) 03/24/2025    CREATININE 1.68 (H) 03/24/2025     CT brain attack angio head and neck W and WO IV contrast  Narrative: Interpreted By:  Jyoti West,   STUDY:  CT BRAIN ATTACK ANGIO HEAD AND NECK W AND WO IV CONTRAST;  3/24/2025  10:22 pm      INDICATION:  Signs/Symptoms:let sided weakness.      COMPARISON:  CT head 03/24/2025      ACCESSION NUMBER(S):  [id number]      ORDERING CLINICIAN:  ALLISON MEDELLIN      TECHNIQUE:  Contrast was administered intravenously and axial images of the head  and neck were acquired.  Coronal, sagittal, and 3-D reconstructions  were provided for review.      FINDINGS:  Extensive paranasal sinus inflammatory changes are again noted.      CTA HEAD FINDINGS:      Anterior circulation: Mild atherosclerotic calcifications of the  bilateral carotid siphons. The bilateral intracranial internal  carotid arteries, bilateral carotid terminals, bilateral proximal  anterior and middle cerebral arteries are normal.      Posterior circulation: Minimal atherosclerotic calcifications of the  bilateral proximal intradural vertebral arteries. Fetal type origin  of the right PCA is noted. Otherwise bilateral intracranial vertebral  arteries, vertebrobasilar junction, basilar artery and proximal  posterior cerebral arteries are normal.      CTA NECK FINDINGS:      Patient body habitus and bilateral shoulder arthroplasties,  contribute to beam hardening in partially limited evaluation of the  lower cervical spine. Status post median sternotomy.      Right carotid vessels: The common carotid artery is normal. Scattered  atherosclerotic calcifications of the carotid bulb and proximal  cervical ICA.. There is 20%  stenosis  by NASCET criteria.      Left carotid vessels: The common carotid artery is normal. Scattered  atherosclerotic calcifications of the carotid bulb and proximal  cervical ICA. There is 0% stenosis  by NASCET criteria.      Vertebral vessels:  The visualized segments of the cervical vertebral  arteries are normal in caliber.      Heterogeneous enlarged thyroid. Mild atherosclerotic calcifications  of the visualized aortic arch.      Hazy opacities in the upper lung fields.      Impression: No evidence for significant stenosis of the cervical vessels. Up to  20% narrowing of the proximal right cervical ICA.      No evidence for significant stenosis or large branch vessel cutoffs  of the intracranial vessels.      MACRO:  None.      Signed by: Jyoti West 3/24/2025 10:39 PM  Dictation workstation:   [id number]  CT brain attack head wo IV contrast  Narrative: Interpreted By:  Jyoti West,   STUDY:  CT BRAIN ATTACK HEAD WO IV CONTRAST;  3/24/2025 10:09 pm      INDICATION:  Signs/Symptoms:Stroke Evaluation. Left-sided weakness and difficulty  speaking. Currently on Plavix      COMPARISON:  None      ACCESSION NUMBER(S):  [id number]      ORDERING CLINICIAN:  ALLISON MEDELLIN      TECHNIQUE:  Axial noncontrast CT images of the head.      FINDINGS:  BRAIN PARENCHYMA: Gray-white matter interfaces are preserved. No mass  effect or midline shift.      HEMORRHAGE: No acute intracranial hemorrhage.  VENTRICLES and EXTRA-AXIAL SPACES: Normal size.  EXTRACRANIAL SOFT TISSUES: Within normal limits.  PARANASAL SINUSES/MASTOIDS: Hyperdense opacification of the sphenoid  sinuses and posterior ethmoid air cells with scattered mucosal  thickening and erosive changes. Polyp or mucous retention cyst in the  left maxillary sinus. Mastoid air cells appear grossly patent.  CALVARIUM: No depressed skull fracture. No destructive osseous lesion.      OTHER FINDINGS: None.      Impression: No acute intracranial hemorrhage or mass  effect. MRI may be obtained  as clinically indicated to evaluate for acute ischemic infarct.      Parenchymal volume loss and mild nonspecific white matter changes.      Chronic appearing paranasal sinus inflammatory changes. Underlying  fungal process not excluded.      MACRO:  Jyoti West discussed the significance and urgency of this  critical finding by telephone with  ALLISON MEDELLIN on 3/24/2025 at  10:20 pm.  (**-RCF-**) Findings:  See findings.          Signed by: Jyoti West 3/24/2025 10:22 PM  Dictation workstation:   [id number]    ED Medication Administration from 03/24/2025 2153 to 03/24/2025 2321         Date/Time Order Dose Route Action Action by     03/24/2025 2221 EDT iohexol (OMNIPaque) 350 mg iodine/mL solution 75 mL 75 mL intravenous Given Markle, B               Assessment/Plan   Assessment & Plan  TIA (transient ischemic attack)      #TIA  -Symptoms have improved  -NPO until swallow eval  -SLP consult  -Hold off on TNK per patient/family  -Neuro checks Q4H  -Please ensure speech component of neuro checks, ie recite a several word sentence  -Consult neurology  -Would TNK and transfer to ICU if symptoms return  -Permissive HTN  -ASA, statin  -Swallow screen    #HTN  #HLD  #CAD  -Hold home BP meds  -Continue other medications as appropriate             MARCIA Chahal-CNP

## 2025-03-25 NOTE — CONSULTS
"Inpatient consult to Neuro TeleStroke  Consult performed by: Suzanne Holbrook MD  Consult ordered by: Alize Florentino MD  Reason for consult: concern for stroke      Page time: 10:02 pm  Call return: 10:03 pm; ED provider in call 10:07  Provider in video: 10:10 pm, patient in CT  Patient in video: 10:32 pm    History Of Present Illness:  Historian: Family and ED Provider   Alireza Bellamy is a 69 y.o. right handed male presenting with difficulty speaking and Left hemiparesis per EMS.  In the ED the hemiparesis was not seen but he is noted to have aphasia.  Last known well: 8 pm   Had stroke symptoms resolved at time of presentation: Yes   Current antiplatelet/anticoagulant use: Clopidogrel    Prior Functional Status (Modified Luis Scale):  0 The patient has no residual symptoms.    Stroke Risk Factors:  H/O CAD    Last Recorded Vitals:  Blood pressure (!) 167/111, pulse 62, temperature 37 °C (98.6 °F), temperature source Temporal, resp. rate 20, height 1.702 m (5' 7\"), weight 104 kg (230 lb), SpO2 97%.    EXAM: alert, moderately inattentive with a left gaze preference.  He says he doesn't understand and utters a few other words intermittently.  Not naming, not answering questions and not following any commands.  He is not able to provide any history.  Unable to assess visual fields as he is not blinking on either side and cannot count fingers.  No facial asymmetry.  He is able to hold up both hands without drift.  No drift in the legs.  Grimaces to pinch bilaterally.      NIHSS:  1A. Level of Consciousness: 0  1B. Ask Month and Age: 2  1C. Blink Eyes & Squeeze Hands: 2  2. Best Gaze: 1  3. Visual: could not assess; not blinking to threat either side  4. Facial Palsy: 0  5A. Motor - Left Arm: 0  5B. Motor - Right Arm: 0  6A. Motor - Left Le  6B. Motor - Right Le  7. Limb Ataxia: 0  8. Sensory Loss: 0  9. Best Language: 2  10. Dysarthria: 0  11. Extinction and Inattention: 0  NIH Stroke Scale: 7   " "    Relevant Results:  LABS:  No results found for: \"GLUCOSE\", \"INR\"   Results for orders placed or performed during the hospital encounter of 03/24/25 (from the past 24 hours)   POCT GLUCOSE   Result Value Ref Range    POCT Glucose 119 (H) 74 - 99 mg/dL   CBC and Auto Differential   Result Value Ref Range    WBC 11.4 (H) 4.4 - 11.3 x10*3/uL    nRBC 0.0 0.0 - 0.0 /100 WBCs    RBC 4.48 (L) 4.50 - 5.90 x10*6/uL    Hemoglobin 14.4 13.5 - 17.5 g/dL    Hematocrit 42.5 41.0 - 52.0 %    MCV 95 80 - 100 fL    MCH 32.1 26.0 - 34.0 pg    MCHC 33.9 32.0 - 36.0 g/dL    RDW 14.2 11.5 - 14.5 %    Platelets 268 150 - 450 x10*3/uL    Neutrophils % 63.3 40.0 - 80.0 %    Immature Granulocytes %, Automated 0.4 0.0 - 0.9 %    Lymphocytes % 20.8 13.0 - 44.0 %    Monocytes % 9.8 2.0 - 10.0 %    Eosinophils % 5.1 0.0 - 6.0 %    Basophils % 0.6 0.0 - 2.0 %    Neutrophils Absolute 7.20 (H) 1.60 - 5.50 x10*3/uL    Immature Granulocytes Absolute, Automated 0.05 0.00 - 0.50 x10*3/uL    Lymphocytes Absolute 2.37 0.80 - 3.00 x10*3/uL    Monocytes Absolute 1.12 (H) 0.05 - 0.80 x10*3/uL    Eosinophils Absolute 0.58 (H) 0.00 - 0.40 x10*3/uL    Basophils Absolute 0.07 0.00 - 0.10 x10*3/uL        CT Head Imaging:  CTH imaging personally reviewed, showed no acute ischemic / hemorrhagic changes     CTA Head and Neck Imaging:  CTA head and neck imaging personally reviewed, no large vessel occlusion or severe stenosis seen    CT Perfusion Imaging:  Not done    Diagnosis:  Supect Acute Ischemic Stroke    IV Thrombolysis IV Thrombolysis Checklist        IV Thrombolysis Given: Yes Thrombolysis Administration: Patient meets inclusion and exclusion criteria with expected benefits exceeding the risks of IV thrombolysis therapy or withholding therapy. Patient/ family understand potential risks & benefits and consent to IV Thrombolysis. Discussed the diagnosis of suspected ischemic stroke and options for treatment, including alternative treatments. For patients " meeting inclusion and exclusion criteria, the expected benefits exceed the risks of IV thrombolysis therapy or withholding therapy. The main risk of IV thrombolysis therapy is bleeding into the brain or body that may require blood transfusions or lead to death. In clinical studies, the rate of death was not higher in patients who received IV thrombolysis compared to those who did not. Other risks include allergic reactions, and with any procedure there is always the possibility of an unexpected complication.  Patient is <18 - refer [facility] for Emergency Management of pediatric patients with Acute suspected Stroke & the Pediatric IV Thrombolysis Consent. Consent is completed on a paper document and if criteria is met/ benefit outweigh the risk the thrombolytic Alteplase should be given.  :99}Were there delays to thrombolysis administration?: no        Patient is a candidate for thrombectomy:  yes/no: No; contraindication reason: No evidence of proximal occlusion    Additional Recommendations:  MRI Brain w/o contrast stroke protocol or repeat CTH 24 hours after initial CTH if unable to get MRI.  TTE w/ bubble study.  Lipid panel, A1c.  Hold off all antiplatelets and anticoagulant medications for 24 hrs after thrombolysis. Repeat CT Head 24 hrs after TNK administration. If CTH negative for hemorrhagic conversion, consider starting Aspirin 81 mg.  No antiplatelets/anticoagulants for 24 hours  Lipid Goals: education on healthy diet and statin therapy to maintain or achieve goal LDL-cholesterol < 70mg. Atorvastatin 80mg..  Blood pressure goals: avoid hypotension SBP <100 that could worsen cerebral perfusion. Ischemic stroke post-thrombolysis- BP < 180/105 mmHg for 24hr..  Glucose Goals: early treatment of hyperglycemia to goal glucose 140-180 mg/dl with long-term goal A1c < 7%.  NPO until nurse bedside swallow assessment.  Consider focused stroke order set.  Smoking Cessation and Education.  Assessment for Rehabilitation  needs.  Patient and family education on signs and symptoms of stroke, calling 911, healthy strategies for stroke prevention.      Disposition:  Patient will remain at referring facility for further evaluation and management.    Virtual or Telephone Consent    An interactive audio and video telecommunication system which permits real time communications between the patient (at the originating site) and provider (at the distant site) was utilized to provide this telehealth service.   Verbal consent was requested and obtained from family in the room on this date, 03/24/25 for a telehealth visit.      Telestroke is covered in shift work. If there are further Neurological questions or concerns please contact your regional neurologist on call during daytime hours or contact the transfer center with an ADT20 order.    Suzanne Holbrook MD

## 2025-03-25 NOTE — CONSULTS
Inpatient consult to Neurology  Consult performed by: Abraham Arias MD  Consult ordered by: MARCIA Chahal-LEAH          History Of Present Illness  Alireza Bellamy is a 69 y.o. male presenting with encephalopathy vs aphasia.    Yesterday evening at dinner he sudden had an episode of staring off and being unable to talk. He remained alert but he was unresponsive. This slowly improved over the course of 3 hours or so. EMG reported that he had a possibly left hemiparesis, however both the patient and the family at bedside deny that this was the case. Today he is much better but he is still not behaving like himself. For instance he was not able to tell me the name of one of his family members at bedside. This is all limited by severely impaired hearing and he is able to read lips to some degree. The patient himself does not recall being unable to speak but he does remember being quite confused last night.    The patient denies any recent symptoms of illness though he does have mild leukocytosis. His BP on arrival was systolic 160s and normalized after. He has a minor degree of memory impairment at baseline. He does take aspirin 81 mg daily at home and possibly a statin. He chews tobacco but does not smoke.    Past Medical History  Past Medical History:   Diagnosis Date    Arthritis     Coronary artery disease     Hearing aid worn     bilateral    Heart disease     HL (hearing loss)     Hyperlipidemia     Hypertension     Joint pain     Nephrolithiasis     PONV (postoperative nausea and vomiting)     Vertigo     Wears glasses      Surgical History  Past Surgical History:   Procedure Laterality Date    ANKLE FRACTURE SURGERY Left     CARDIAC CATHETERIZATION  09/27/2023    4 VALENTINA to Circ    CARDIAC CATHETERIZATION N/A 10/05/2023    Procedure: PCI VALENTINA Stent- Coronary;  Surgeon: Deborah Oates MD;  Location: [facility];  Service: Cardiovascular;  Laterality: N/A;  PCI of the SVG to OM1 and OM2. IV NS  75/hr for 2 hours prior to procedure. BMP on admit.    CARDIAC CATHETERIZATION N/A 10/05/2023    Procedure: Left Heart Cath, No LV;  Surgeon: Deborah Oates MD;  Location: [facility];  Service: Cardiovascular;  Laterality: N/A;    CARPAL TUNNEL RELEASE      COLONOSCOPY      CORONARY ANGIOPLASTY  09/27/2023    4 VALENTINA to Circ    CORONARY ARTERY BYPASS GRAFT  2008    CORONARY STENT PLACEMENT      CYSTOSCOPY      JOINT REPLACEMENT Bilateral     LEG SURGERY Left     left lower leg fracture    LITHOTRIPSY      SHOULDER SURGERY Right 06/2024    SINUS SURGERY      TOTAL KNEE ARTHROPLASTY Bilateral     TOTAL SHOULDER ARTHROPLASTY Left      Social History  Social History     Tobacco Use    Smoking status: Never    Smokeless tobacco: Current     Types: Chew   Vaping Use    Vaping status: Never Used   Substance Use Topics    Alcohol use: Never    Drug use: Never     Allergies  Patient has no known allergies.  Medications Prior to Admission   Medication Sig Dispense Refill Last Dose/Taking    aspirin 81 mg EC tablet Take 1 tablet (81 mg) by mouth once daily.   3/25/2025 Morning    atorvastatin (Lipitor) 80 mg tablet Take 1 tablet (80 mg) by mouth once daily. 90 tablet 3 3/25/2025 Morning    carvedilol (Coreg) 12.5 mg tablet Take 1 tablet (12.5 mg) by mouth 2 times daily (morning and late afternoon). 180 tablet 3 3/25/2025 Morning    coenzyme Q-10 100 mg capsule Take 1 capsule (100 mg) by mouth once daily.   3/25/2025 Morning    isosorbide mononitrate ER (Imdur) 30 mg 24 hr tablet Take 1 tablet (30 mg) by mouth once daily. Do not crush or chew. 90 tablet 3 3/25/2025 Morning    losartan (Cozaar) 25 mg tablet Take 1 tablet (25 mg) by mouth once daily. 90 tablet 3 3/25/2025 Morning    cyclobenzaprine (Flexeril) 10 mg tablet Take 1 tablet (10 mg) by mouth as needed at bedtime for muscle spasms for up to 20 days. 20 tablet 0     naproxen sodium (Aleve) 220 mg tablet Take 1 tablet (220 mg) by mouth every 12 hours if needed for  mild pain (1 - 3).   Unknown    nitroglycerin (Nitrostat) 0.4 mg SL tablet Place 1 tablet (0.4 mg) under the tongue every 5 minutes if needed for chest pain. 25 tablet 5 Unknown       Review of Systems  Neurological Exam  Mental Status  Awake, alert and oriented to person, place and time. Speech is normal. Able to name objects and repeat. Follows two-step commands. Attention and concentration are normal.    Cranial Nerves  CN II: Visual fields full to confrontation.  CN III, IV, VI: Extraocular movements intact bilaterally. Normal saccades. Normal smooth pursuit. Normal lids and orbits bilaterally. Pupils equal round and reactive to light bilaterally.  CN V:  Right: Facial sensation is normal.  Left: Facial sensation is normal on the left.  CN VII: Full and symmetric facial movement.  CN VIII: Hearing is normal.  CN IX, X: Palate elevates symmetrically  CN XI: Shoulder shrug strength is normal.  CN XII: Tongue midline without atrophy or fasciculations.    Motor  Normal muscle bulk throughout. No fasciculations present. Normal muscle tone. No abnormal involuntary movements.                                               Right                     Left   Shoulder abduction               5                          5  Elbow flexion                         5                          5  Elbow extension                    5                          5  Finger flexion                         5                          5  Finger extension                    5                          5  Finger abduction                    5                          5  Hip flexion                              5                          5  Knee flexion                           5                          5  Plantarflexion                         5                          5  Dorsiflexion                            5                          5    Sensory  Light touch is normal in upper and lower extremities.     Reflexes                             "                Right                      Left  Biceps                                 2+                         2+  Triceps                                2+                         2+  Patellar                                2+                         2+  Achilles                                0                         0  Right Plantar: downgoing  Left Plantar: downgoing    Right pathological reflexes: Jose's absent.  Left pathological reflexes: Jose's absent.    Coordination  Right: Finger-to-nose normal.Left: Finger-to-nose normal.    Physical Exam  Eyes:      General: Lids are normal.      Extraocular Movements: EOM normal.      Pupils: Pupils are equal, round, and reactive to light.   Neurological:      Deep Tendon Reflexes:      Reflex Scores:       Tricep reflexes are 2+ on the right side and 2+ on the left side.       Bicep reflexes are 2+ on the right side and 2+ on the left side.       Patellar reflexes are 2+ on the right side and 2+ on the left side.       Achilles reflexes are 0 on the right side and 0 on the left side.  Psychiatric:         Speech: Speech normal.       Last Recorded Vitals  Blood pressure 169/80, pulse 58, temperature 36.3 °C (97.3 °F), resp. rate 18, height 1.702 m (5' 7.01\"), weight 100 kg (221 lb 1.9 oz), SpO2 96%.    Relevant Results        NIH Stroke Scale  1A. Level of Consciousness: Alert, Keenly Responsive  1B. Ask Month and Age: Both Questions Right  1C. Blink Eyes & Squeeze Hands: Performs Both Tasks  2. Best Gaze: Normal  3. Visual: No Visual Loss  4. Facial Palsy: Normal Symmetrical Movements  5A. Motor - Left Arm: No Drift  5B. Motor - Right Arm: No Drift  6A. Motor - Left Leg: No Drift  6B. Motor - Right Leg: No Drift  7. Limb Ataxia: Absent  8. Sensory Loss: Normal  9. Best Language: No Aphasia  10. Dysarthria: Normal  11. Extinction and Inattention: No Abnormality  NIH Stroke Scale: 0           Topher Coma Scale  Best Eye Response: Spontaneous  Best Verbal " Response: Oriented  Best Motor Response: Follows commands  [place] Coma Scale Score: 15                 I have personally reviewed the following imaging results ECG 12 lead    Result Date: 3/25/2025  Normal sinus rhythm Left axis deviation Abnormal QRS-T angle, consider primary T wave abnormality Abnormal ECG When compared with ECG of 13-JUN-2024 22:17, Questionable change in QRS duration See ED provider note for full interpretation and clinical correlation Confirmed by Gisela Jensen ([id number]) on 3/25/2025 10:35:25 AM    MR brain wo IV contrast    Result Date: 3/25/2025  Interpreted By:  Felipa White, STUDY: MR ANGIO HEAD WO IV CONTRAST; MR BRAIN WO IV CONTRAST; MR ANGIO NECK WO IV CONTRAST   INDICATION: Signs/Symptoms:CVA   COMPARISON: Head CT 03/24/2025   ACCESSION NUMBER(S): [id number]; [id number]; [id number]   ORDERING CLINICIAN: MARC DUTTON   TECHNIQUE: Multi-planar multi-sequential MR imaging of the brain was performed without intravenous contrast. MRA of the head using time-of-flight technique was performed without intravenous contrast. MRA of the neck using time-of-flight technique was performed without intravenous contrast.   FINDINGS: MRI BRAIN: Linear areas of diffusion restriction within the cortex and subcortical white matter of the left parietal lobe (series 7 image 25 through 29). No mass effect or midline shift. No hemorrhagic conversion.   No intracranial mass. Mild diffuse parenchymal volume loss.   No hydrocephalus.  No extra-axial fluid collections.   The visualized intraorbital contents are normal. Moderate mucosal disease of the ethmoid air cells and left sphenoid sinus. The mastoid air cells are clear. The visualized osseous structures, soft tissues and partially visualized parotid glands appear normal.   MRA NECK:   Standard configuration of the aortic arch with no gross ostial stenosis of the great vessels.   The common carotid arteries are patent bilaterally without evidence  of stenosis. There is no narrowing of the cervical internal carotid arteries bilaterally.   The vertebral arteries are patent bilaterally throughout their course.   MRA HEAD:   Normal distal internal carotid arteries.   The proximal anterior, middle and posterior cerebral arteries are patent bilaterally.   Normal vertebrobasilar system.   No evidence of vascular stenosis, occlusion, aneurysm or vascular malformation.       Multifocal areas of diffusion restriction within the cortex and subcortical white matter of the left parietal lobe. No hemorrhagic conversion or mass effect.   Cervical and intracranial vasculature are within normal limits. No large vessel occlusion, aneurysm, or high-grade stenosis.   _____________ NASCET criteria for internal carotid artery stenosis: Mild: 0% to 49% Moderate: 50% to 69% Severe: 70% to 99% Complete Occlusion   Signed by: Felipa White 3/25/2025 9:55 AM Dictation workstation:   [id number]    MR angio head wo IV contrast    Result Date: 3/25/2025  Interpreted By:  Felipa White, STUDY: MR ANGIO HEAD WO IV CONTRAST; MR BRAIN WO IV CONTRAST; MR ANGIO NECK WO IV CONTRAST   INDICATION: Signs/Symptoms:CVA   COMPARISON: Head CT 03/24/2025   ACCESSION NUMBER(S): [id number]; [id number]; [id number]   ORDERING CLINICIAN: MARC DUTTON   TECHNIQUE: Multi-planar multi-sequential MR imaging of the brain was performed without intravenous contrast. MRA of the head using time-of-flight technique was performed without intravenous contrast. MRA of the neck using time-of-flight technique was performed without intravenous contrast.   FINDINGS: MRI BRAIN: Linear areas of diffusion restriction within the cortex and subcortical white matter of the left parietal lobe (series 7 image 25 through 29). No mass effect or midline shift. No hemorrhagic conversion.   No intracranial mass. Mild diffuse parenchymal volume loss.   No hydrocephalus.  No extra-axial fluid collections.   The visualized  intraorbital contents are normal. Moderate mucosal disease of the ethmoid air cells and left sphenoid sinus. The mastoid air cells are clear. The visualized osseous structures, soft tissues and partially visualized parotid glands appear normal.   MRA NECK:   Standard configuration of the aortic arch with no gross ostial stenosis of the great vessels.   The common carotid arteries are patent bilaterally without evidence of stenosis. There is no narrowing of the cervical internal carotid arteries bilaterally.   The vertebral arteries are patent bilaterally throughout their course.   MRA HEAD:   Normal distal internal carotid arteries.   The proximal anterior, middle and posterior cerebral arteries are patent bilaterally.   Normal vertebrobasilar system.   No evidence of vascular stenosis, occlusion, aneurysm or vascular malformation.       Multifocal areas of diffusion restriction within the cortex and subcortical white matter of the left parietal lobe. No hemorrhagic conversion or mass effect.   Cervical and intracranial vasculature are within normal limits. No large vessel occlusion, aneurysm, or high-grade stenosis.   _____________ NASCET criteria for internal carotid artery stenosis: Mild: 0% to 49% Moderate: 50% to 69% Severe: 70% to 99% Complete Occlusion   Signed by: Felipa White 3/25/2025 9:55 AM Dictation workstation:   [id number]    MR angio neck wo IV contrast    Result Date: 3/25/2025  Interpreted By:  Felipa White, STUDY: MR ANGIO HEAD WO IV CONTRAST; MR BRAIN WO IV CONTRAST; MR ANGIO NECK WO IV CONTRAST   INDICATION: Signs/Symptoms:CVA   COMPARISON: Head CT 03/24/2025   ACCESSION NUMBER(S): [id number]; [id number]; [id number]   ORDERING CLINICIAN: MARC DUTTON   TECHNIQUE: Multi-planar multi-sequential MR imaging of the brain was performed without intravenous contrast. MRA of the head using time-of-flight technique was performed without intravenous contrast. MRA of the neck using  time-of-flight technique was performed without intravenous contrast.   FINDINGS: MRI BRAIN: Linear areas of diffusion restriction within the cortex and subcortical white matter of the left parietal lobe (series 7 image 25 through 29). No mass effect or midline shift. No hemorrhagic conversion.   No intracranial mass. Mild diffuse parenchymal volume loss.   No hydrocephalus.  No extra-axial fluid collections.   The visualized intraorbital contents are normal. Moderate mucosal disease of the ethmoid air cells and left sphenoid sinus. The mastoid air cells are clear. The visualized osseous structures, soft tissues and partially visualized parotid glands appear normal.   MRA NECK:   Standard configuration of the aortic arch with no gross ostial stenosis of the great vessels.   The common carotid arteries are patent bilaterally without evidence of stenosis. There is no narrowing of the cervical internal carotid arteries bilaterally.   The vertebral arteries are patent bilaterally throughout their course.   MRA HEAD:   Normal distal internal carotid arteries.   The proximal anterior, middle and posterior cerebral arteries are patent bilaterally.   Normal vertebrobasilar system.   No evidence of vascular stenosis, occlusion, aneurysm or vascular malformation.       Multifocal areas of diffusion restriction within the cortex and subcortical white matter of the left parietal lobe. No hemorrhagic conversion or mass effect.   Cervical and intracranial vasculature are within normal limits. No large vessel occlusion, aneurysm, or high-grade stenosis.   _____________ NASCET criteria for internal carotid artery stenosis: Mild: 0% to 49% Moderate: 50% to 69% Severe: 70% to 99% Complete Occlusion   Signed by: Felipa White 3/25/2025 9:55 AM Dictation workstation:   [id number]    CT brain attack angio head and neck W and WO IV contrast    Result Date: 3/24/2025  Interpreted By:  Jyoti West, STUDY: CT BRAIN ATTACK ANGIO HEAD AND  NECK W AND WO IV CONTRAST;  3/24/2025 10:22 pm   INDICATION: Signs/Symptoms:let sided weakness.   COMPARISON: CT head 03/24/2025   ACCESSION NUMBER(S): [id number]   ORDERING CLINICIAN: ALLISON MEDELLIN   TECHNIQUE: Contrast was administered intravenously and axial images of the head and neck were acquired.  Coronal, sagittal, and 3-D reconstructions were provided for review.   FINDINGS: Extensive paranasal sinus inflammatory changes are again noted.   CTA HEAD FINDINGS:   Anterior circulation: Mild atherosclerotic calcifications of the bilateral carotid siphons. The bilateral intracranial internal carotid arteries, bilateral carotid terminals, bilateral proximal anterior and middle cerebral arteries are normal.   Posterior circulation: Minimal atherosclerotic calcifications of the bilateral proximal intradural vertebral arteries. Fetal type origin of the right PCA is noted. Otherwise bilateral intracranial vertebral arteries, vertebrobasilar junction, basilar artery and proximal posterior cerebral arteries are normal.   CTA NECK FINDINGS:   Patient body habitus and bilateral shoulder arthroplasties, contribute to beam hardening in partially limited evaluation of the lower cervical spine. Status post median sternotomy.   Right carotid vessels: The common carotid artery is normal. Scattered atherosclerotic calcifications of the carotid bulb and proximal cervical ICA.. There is 20% stenosis  by NASCET criteria.   Left carotid vessels: The common carotid artery is normal. Scattered atherosclerotic calcifications of the carotid bulb and proximal cervical ICA. There is 0% stenosis  by NASCET criteria.   Vertebral vessels:  The visualized segments of the cervical vertebral arteries are normal in caliber.   Heterogeneous enlarged thyroid. Mild atherosclerotic calcifications of the visualized aortic arch.   Hazy opacities in the upper lung fields.       No evidence for significant stenosis of the cervical vessels. Up to 20%  narrowing of the proximal right cervical ICA.   No evidence for significant stenosis or large branch vessel cutoffs of the intracranial vessels.   MACRO: None.   Signed by: Jyoti West 3/24/2025 10:39 PM Dictation workstation:   [id number]    CT brain attack head wo IV contrast    Result Date: 3/24/2025  Interpreted By:  Jyoti West, STUDY: CT BRAIN ATTACK HEAD WO IV CONTRAST;  3/24/2025 10:09 pm   INDICATION: Signs/Symptoms:Stroke Evaluation. Left-sided weakness and difficulty speaking. Currently on Plavix   COMPARISON: None   ACCESSION NUMBER(S): [id number]   ORDERING CLINICIAN: ALLISON MEDELLIN   TECHNIQUE: Axial noncontrast CT images of the head.   FINDINGS: BRAIN PARENCHYMA: Gray-white matter interfaces are preserved. No mass effect or midline shift.   HEMORRHAGE: No acute intracranial hemorrhage. VENTRICLES and EXTRA-AXIAL SPACES: Normal size. EXTRACRANIAL SOFT TISSUES: Within normal limits. PARANASAL SINUSES/MASTOIDS: Hyperdense opacification of the sphenoid sinuses and posterior ethmoid air cells with scattered mucosal thickening and erosive changes. Polyp or mucous retention cyst in the left maxillary sinus. Mastoid air cells appear grossly patent. CALVARIUM: No depressed skull fracture. No destructive osseous lesion.   OTHER FINDINGS: None.       No acute intracranial hemorrhage or mass effect. MRI may be obtained as clinically indicated to evaluate for acute ischemic infarct.   Parenchymal volume loss and mild nonspecific white matter changes.   Chronic appearing paranasal sinus inflammatory changes. Underlying fungal process not excluded.   MACRO: Jyoti West discussed the significance and urgency of this critical finding by telephone with  ALLISON MEDELLIN on 3/24/2025 at 10:20 pm.  (**-RCF-**) Findings:  See findings.     Signed by: Jyoti West 3/24/2025 10:22 PM Dictation workstation:   [id number]    MR lumbar spine wo IV contrast    Result Date: 3/3/2025  EXAMINATION: MRI OF THE LUMBAR  SPINE WITHOUT CONTRAST, 3/3/2025 9:56 am TECHNIQUE: Multiplanar multisequence MRI of the lumbar spine was performed without the administration of intravenous contrast. COMPARISON: None. HISTORY: ORDERING SYSTEM PROVIDED HISTORY: Lumbar radiculopathy, Bilateral leg pain, Bilateral leg pain TECHNOLOGIST PROVIDED HISTORY: Reason for exam:->eval canal stenosis What reading provider will be dictating this exam?->CRC FINDINGS: No evidence of fracture.  There is moderate disc space narrowing at T12/L1 and mild disc space narrowing at L1/L2 and L2/L3.  There is anterolisthesis of L4 on L5 of approximately 5 mm.  Conus medullaris is unremarkable.  No evidence epidural mass or hematoma. L1/L2: Circumferential disc bulge is present.  No central canal stenosis. There is mild bilateral neural foraminal narrowing more significant on the left. L2/L3: Circumferential disc bulge is present with facet hypertrophy resulting in mild circumferential central canal stenosis.  There is severe left neural foraminal narrowing and moderate to severe right neural foraminal narrowing. L3/L4: Mild circumferential disc bulge is present with hypertrophy of ligamentum flavum and severe facet hypertrophy resulting in moderate to severe central canal stenosis.  Severe bilateral neural foraminal narrowing is present more significant on the left. L4/L5: There is severe central canal stenosis due to severe facet hypertrophy and hypertrophy of ligamentum flavum.  Severe bilateral neural foraminal narrowing is present. L5/S1: There is severe facet hypertrophy.  No significant central canal stenosis.  There is severe bilateral neural foraminal narrowing.    1. Severe central canal stenosis is present at L3/L4 and L4/L5. 2. Multiple levels of severe neural foraminal narrowing notable at L3/L4, L4/L5, and L5/S1. 3. There is anterolisthesis of L4 on L5 of approximately 5 mm which is likely degenerative in etiology given severe facet arthropathy at this level.  4. No acute osseous abnormality.  .      Assessment/Plan   Assessment & Plan  Acute CVA (cerebrovascular accident) (Multi)      Impression:  Left parietal infarct, likely embolic    Secondary diagnoses:  HTN, HLD, CAD    Recommend:   Load with Plavix 300 mg  Aspirin 81 mg + Plavix 75 mg for 3 weeks, then Plavix monotherapy  High intensity statin   TTE  Likely ziopatch on discharge    I spent 75 minutes in the professional and overall care of this patient.      Abraham Arias MD

## 2025-03-25 NOTE — ED PROCEDURE NOTE
Procedure  Critical Care    Performed by: Alize Florentino MD  Authorized by: Alize Florentino MD    Critical care provider statement:     Critical care time (minutes):  37    Critical care time was exclusive of:  Separately billable procedures and treating other patients    Critical care was necessary to treat or prevent imminent or life-threatening deterioration of the following conditions:  CNS failure or compromise    Critical care was time spent personally by me on the following activities:  Ordering and performing treatments and interventions, ordering and review of laboratory studies, ordering and review of radiographic studies, pulse oximetry, re-evaluation of patient's condition, review of old charts, discussions with consultants, examination of patient and evaluation of patient's response to treatment    Care discussed with: admitting provider                 Alize Florentino MD  03/24/25 0652

## 2025-03-25 NOTE — PROGRESS NOTES
03/25/25 1631   Discharge Planning   Living Arrangements Children   Support Systems Children;Family members   Assistance Needed cleaning, laundry. given private pay agencies list. indeppendent in most adls, son assits w. some iadls.  plan is home   Type of Residence Private residence   Number of Stairs Within Residence 14  (basement laundry)   Home or Post Acute Services None;Community services  (? [facility])   Transportation Needs   In the past 12 months, has lack of transportation kept you from medical appointments or from getting medications? no   In the past 12 months, has lack of transportation kept you from meetings, work, or from getting things needed for daily living? No   Stroke Family Assessment   Stroke Family Assessment Needed No   Intensity of Service   Intensity of Service 0-30 min     Confirmed demo' s. plan is home no needs.

## 2025-03-25 NOTE — PROGRESS NOTES
03/25/25 1631   Discharge Planning   Living Arrangements Alone   Support Systems Children;Family members   Assistance Needed family transports. pt independent tin most adls. no Ad use. no medication barriers. limited assessment d/t aphasia. will cont. to assess.   Type of Residence Private residence   Number of Stairs to Enter Residence 3   Number of Stairs Within Residence   (basement laundry)   Home or Post Acute Services Post acute facilities (Rehab/SNF/etc)  (? [facility])   Type of Post Acute Facility Services Rehab   Expected Discharge Disposition Rehab   Does the patient need discharge transport arranged? Yes   Transportation Needs   In the past 12 months, has lack of transportation kept you from medical appointments or from getting medications? no   In the past 12 months, has lack of transportation kept you from meetings, work, or from getting things needed for daily living? No   Stroke Family Assessment   Stroke Family Assessment Needed Yes     Pt confirms demo' s assessment limited. Requested speech tx order. Pt improving per nursing in aphasia. Pt/ot to see. Will cont. To follow .

## 2025-03-26 ENCOUNTER — APPOINTMENT (OUTPATIENT)
Dept: CARDIOLOGY | Facility: HOSPITAL | Age: 70
End: 2025-03-26
Payer: MEDICARE

## 2025-03-26 LAB
ANION GAP SERPL CALC-SCNC: 10 MMOL/L (ref 10–20)
ATRIAL RATE: 60 BPM
BUN SERPL-MCNC: 26 MG/DL (ref 6–23)
CALCIUM SERPL-MCNC: 9 MG/DL (ref 8.6–10.3)
CHLORIDE SERPL-SCNC: 108 MMOL/L (ref 98–107)
CO2 SERPL-SCNC: 25 MMOL/L (ref 21–32)
CREAT SERPL-MCNC: 1.61 MG/DL (ref 0.5–1.3)
EGFRCR SERPLBLD CKD-EPI 2021: 45 ML/MIN/1.73M*2
ERYTHROCYTE [DISTWIDTH] IN BLOOD BY AUTOMATED COUNT: 14.3 % (ref 11.5–14.5)
GLUCOSE BLD MANUAL STRIP-MCNC: 146 MG/DL (ref 74–99)
GLUCOSE SERPL-MCNC: 90 MG/DL (ref 74–99)
HCT VFR BLD AUTO: 38.6 % (ref 41–52)
HGB BLD-MCNC: 12.9 G/DL (ref 13.5–17.5)
MCH RBC QN AUTO: 31 PG (ref 26–34)
MCHC RBC AUTO-ENTMCNC: 33.4 G/DL (ref 32–36)
MCV RBC AUTO: 93 FL (ref 80–100)
NRBC BLD-RTO: 0 /100 WBCS (ref 0–0)
P AXIS: 12 DEGREES
P OFFSET: 160 MS
P ONSET: 109 MS
PLATELET # BLD AUTO: 215 X10*3/UL (ref 150–450)
POTASSIUM SERPL-SCNC: 3.9 MMOL/L (ref 3.5–5.3)
PR INTERVAL: 194 MS
Q ONSET: 206 MS
QRS COUNT: 10 BEATS
QRS DURATION: 104 MS
QT INTERVAL: 408 MS
QTC CALCULATION(BAZETT): 408 MS
QTC FREDERICIA: 408 MS
R AXIS: -49 DEGREES
RBC # BLD AUTO: 4.16 X10*6/UL (ref 4.5–5.9)
SODIUM SERPL-SCNC: 139 MMOL/L (ref 136–145)
T AXIS: 91 DEGREES
T OFFSET: 410 MS
VENTRICULAR RATE: 60 BPM
WBC # BLD AUTO: 9.5 X10*3/UL (ref 4.4–11.3)

## 2025-03-26 PROCEDURE — 99232 SBSQ HOSP IP/OBS MODERATE 35: CPT | Performed by: HOSPITALIST

## 2025-03-26 PROCEDURE — 97161 PT EVAL LOW COMPLEX 20 MIN: CPT | Mod: GP | Performed by: PHYSICAL THERAPIST

## 2025-03-26 PROCEDURE — 2500000001 HC RX 250 WO HCPCS SELF ADMINISTERED DRUGS (ALT 637 FOR MEDICARE OP): Performed by: NURSE PRACTITIONER

## 2025-03-26 PROCEDURE — 93005 ELECTROCARDIOGRAM TRACING: CPT

## 2025-03-26 PROCEDURE — 2500000001 HC RX 250 WO HCPCS SELF ADMINISTERED DRUGS (ALT 637 FOR MEDICARE OP): Performed by: STUDENT IN AN ORGANIZED HEALTH CARE EDUCATION/TRAINING PROGRAM

## 2025-03-26 PROCEDURE — 92610 EVALUATE SWALLOWING FUNCTION: CPT | Mod: GN

## 2025-03-26 PROCEDURE — 80048 BASIC METABOLIC PNL TOTAL CA: CPT | Performed by: STUDENT IN AN ORGANIZED HEALTH CARE EDUCATION/TRAINING PROGRAM

## 2025-03-26 PROCEDURE — 92523 SPEECH SOUND LANG COMPREHEN: CPT | Mod: GN

## 2025-03-26 PROCEDURE — 36415 COLL VENOUS BLD VENIPUNCTURE: CPT | Performed by: STUDENT IN AN ORGANIZED HEALTH CARE EDUCATION/TRAINING PROGRAM

## 2025-03-26 PROCEDURE — 2500000004 HC RX 250 GENERAL PHARMACY W/ HCPCS (ALT 636 FOR OP/ED): Performed by: NURSE PRACTITIONER

## 2025-03-26 PROCEDURE — 1200000002 HC GENERAL ROOM WITH TELEMETRY DAILY

## 2025-03-26 PROCEDURE — 97165 OT EVAL LOW COMPLEX 30 MIN: CPT | Mod: GO

## 2025-03-26 PROCEDURE — 2500000001 HC RX 250 WO HCPCS SELF ADMINISTERED DRUGS (ALT 637 FOR MEDICARE OP): Performed by: HOSPITALIST

## 2025-03-26 PROCEDURE — 99231 SBSQ HOSP IP/OBS SF/LOW 25: CPT | Performed by: STUDENT IN AN ORGANIZED HEALTH CARE EDUCATION/TRAINING PROGRAM

## 2025-03-26 PROCEDURE — 82947 ASSAY GLUCOSE BLOOD QUANT: CPT

## 2025-03-26 PROCEDURE — 85027 COMPLETE CBC AUTOMATED: CPT | Performed by: STUDENT IN AN ORGANIZED HEALTH CARE EDUCATION/TRAINING PROGRAM

## 2025-03-26 RX ADMIN — CARVEDILOL 12.5 MG: 12.5 TABLET, FILM COATED ORAL at 18:50

## 2025-03-26 RX ADMIN — HEPARIN SODIUM 5000 UNITS: 5000 INJECTION INTRAVENOUS; SUBCUTANEOUS at 08:00

## 2025-03-26 RX ADMIN — ASPIRIN 81 MG: 81 TABLET, CHEWABLE ORAL at 08:00

## 2025-03-26 RX ADMIN — CLOPIDOGREL 75 MG: 75 TABLET ORAL at 08:00

## 2025-03-26 RX ADMIN — ISOSORBIDE MONONITRATE 30 MG: 30 TABLET, EXTENDED RELEASE ORAL at 08:00

## 2025-03-26 RX ADMIN — CARVEDILOL 12.5 MG: 12.5 TABLET, FILM COATED ORAL at 08:00

## 2025-03-26 RX ADMIN — HEPARIN SODIUM 5000 UNITS: 5000 INJECTION INTRAVENOUS; SUBCUTANEOUS at 00:49

## 2025-03-26 RX ADMIN — APIXABAN 5 MG: 5 TABLET, FILM COATED ORAL at 22:05

## 2025-03-26 RX ADMIN — APIXABAN 5 MG: 5 TABLET, FILM COATED ORAL at 11:19

## 2025-03-26 RX ADMIN — ATORVASTATIN CALCIUM 80 MG: 80 TABLET, FILM COATED ORAL at 22:05

## 2025-03-26 ASSESSMENT — PAIN SCALES - GENERAL
PAINLEVEL_OUTOF10: 0 - NO PAIN

## 2025-03-26 ASSESSMENT — COGNITIVE AND FUNCTIONAL STATUS - GENERAL
PERSONAL GROOMING: A LITTLE
HELP NEEDED FOR BATHING: A LITTLE
WALKING IN HOSPITAL ROOM: A LITTLE
DAILY ACTIVITIY SCORE: 20
MOBILITY SCORE: 22
TOILETING: A LITTLE
CLIMB 3 TO 5 STEPS WITH RAILING: A LITTLE
MOBILITY SCORE: 23
CLIMB 3 TO 5 STEPS WITH RAILING: A LITTLE
DRESSING REGULAR LOWER BODY CLOTHING: A LITTLE
DAILY ACTIVITIY SCORE: 24
MOBILITY SCORE: 22
WALKING IN HOSPITAL ROOM: A LITTLE
DAILY ACTIVITIY SCORE: 24
CLIMB 3 TO 5 STEPS WITH RAILING: A LITTLE

## 2025-03-26 ASSESSMENT — PAIN - FUNCTIONAL ASSESSMENT: PAIN_FUNCTIONAL_ASSESSMENT: 0-10

## 2025-03-26 ASSESSMENT — ACTIVITIES OF DAILY LIVING (ADL): BATHING_ASSISTANCE: MINIMAL

## 2025-03-26 NOTE — PROGRESS NOTES
Physical Therapy    Physical Therapy    Physical Therapy Evaluation    Patient Name: Alireza Bellamy  MRN: [id number]  Today's Date: 3/26/2025   Time Calculation  Start Time: 0954  Stop Time: 1012  Time Calculation (min): 18 min  917/917-B    Assessment/Plan   PT Assessment  Barriers to Discharge Home:  (Recommend increased supervison secondary to his cognitive/processing/motor planning deficits.)  End of Session Communication: Care Coordinator  Assessment Comment: No PT issues at this time  End of Session Patient Position: Bed, 3 rail up, Alarm on  IP OR SWING BED PT PLAN  Inpatient or Swing Bed: Inpatient  PT Plan  PT Plan: PT Eval only  PT Eval Only Reason: No acute PT needs identified  PT Discharge Recommendations: No PT needed after discharge  PT Recommended Transfer Status: Stand by assist  PT - OK to Discharge: (once deemed medically appropriate)    Subjective     General Visit Information:  General  Reason for Referral: Impaired mobility  Referred By: PT/OT/SLP 3/25/25 [place]  Past Medical History Relevant to Rehab: HTN, HLD, CVA, Arthritis, Anemia, Right TSA, Vertigo, Hearing loss, PCI, Chew, CTS, CABG x5, spinal stenosis, obesity, TKA, ankle fracture, Plavix  Family/Caregiver Present:  (Son and ex-wife present during evaluation. Able to participate with part of PLOF  questions.)  Co-Treatment: OT  Co-Treatment Reason: to maximize patient/satff safety  Prior to Session Communication: Care Coordinator (SLP)  Patient Position Received: Bed, 3 rail up, Alarm on  General Comment: To ED 3/24/25 with left sided weaknss and difficulty speaking. Telestroke with [facility] performed recommending TNK NIH -7. However when ED physician assessed patient, expressive aphasia had resolved. Familiy declined TNK. , troponin 23; CT 3/24/25 brain (-); 20% narrowing proximal Right ICA; MRI 3/25/25 Brain multifocal areas of diffusion restriction left parietal lobe; TTE 3/25/25 EF 35%;    Home Living:  Home Living  Home Living  Comments: Per patient report resides with daughter and 2 grandchildren n  1 story home. 2 steps wtih handrail available on both sides. Walk in shower with seat no grab bar.    Prior Level of Function:  Prior Function Per Pt/Caregiver Report  Hand Dominance: Right  Prior Function Comments: Per patient report independent in mobiltiy, ADLs and IADLs without assistive device. Denies any falls. Drives.    Precautions:  Precautions  Hearing/Visual Limitations: [place], glasses  Medical Precautions: Fall precautions       Objective     Pain:  Pain Assessment  0-10 (Numeric) Pain Score: 0 - No pain    Cognition:  Cognition  Overall Cognitive Status:  (Flat)  Orientation Level: Oriented X4  Following Commands:  (Difficulty with 2 step commands. Able to follow simple commands > 75% of time)  Processing Speed: Delayed    General Assessments:  General Observation  General Observation: Patient lying in bed. Familiy visiting. Agreeable to PT/OT   Activity Tolerance  Endurance:  (Good)  Sensation  Light Touch: No apparent deficits        Coordination  Alternating Toe Taps: Bradykinesia  Heel to Shin: Intact (Difficulty folllowing directions)     Static Sitting Balance:  (Good)  Dynamic Sitting Balance: Fair +  Static Standing Balance: Good  Dynamic Standing Balance: Fair +    Functional Assessments:     Bed Mobility  Bed Mobility:  (Supine <> sit independent)  Transfers  Transfer:  (Sit <> stand at bed level independent)  Ambulation/Gait Training  Ambulation/Gait Training Performed:  (Patient ambulated without assistive device 100' independently. Patient does c/o LE cramping related to his spinal stenosis. Did discuss benefits of using ww to offload lower body)          Extremity/Trunk Assessments:  RUE   RUE : Within Functional Limits  LUE   LUE: Within Functional Limits  RLE   RLE :  (AROM Hip/knee/ankleWFL MMT hip 3+/5 knee 5/5, ankle 5/5)  LLE   LLE :  (AROM Hip/knee/ankleWFL MMT 5/5 grossly)    Outcome Measures:     [facility] Basic  Mobility  Turning from your back to your side while in a flat bed without using bedrails: None  Moving from lying on your back to sitting on the side of a flat bed without using bedrails: None  Moving to and from bed to chair (including a wheelchair): None  Standing up from a chair using your arms (e.g. wheelchair or bedside chair): None  To walk in hospital room: None  Climbing 3-5 steps with railing: A little  Basic Mobility - Total Score: 23

## 2025-03-26 NOTE — PROGRESS NOTES
"Speech-Language Pathology    SLP ADULT Inpatient Speech-Language Cognition  Patient Name: Alireza Bellamy  MRN: [id number]  : 1955  Patient Room: [address] [place], [place] [address]  Today's Date: 25  Time Calculation  Start Time: 830  Stop Time: 0848  Time Calculation (min): 18 min               SLP Assessment:  SLP Assessment Results: Pt presenting with mild receptive/expressive and cognitive linguistic deficits.   Prognosis: Good but to pt motivation and family support     The [place] Aphasia Screening Test was administered to patient (MAST):  Scores are out of 100  Expressive Index: 42/50  -Naming: 10/10  -Automatic Speech: 8/10  -Repetition: 8/10  -Writin/10  -Verbal Fluency: 8/10  Receptive Index: 50  -Yes/No Accuracy:   -Object Recognition: 10/10  -Following Instructions: 8/10  -Reading Instructions: 8/10    Dysarthria: Present  Paraphasia: Present  Perseveration: Potentially present (pumpkin x2 for vegetable identification including onion and tomato)  Orientation: NA    Patient scored a 84/100 indicating mild impairments. Alireza demonstrated strong naming skills during the [place] Aphasia Screening Test, but when given additional naming tasks not from screener- he demonstrated some naming impairment of common objects. However, patient expressed that pictures were difficult to see. Possible visual impairments present. Patient demonstrated possible perseveration during naming task which was observed when he stated \"pumpkin\" 2x during naming task. Additional perseveration was not observed. Alireza produced one error during automatic speech task which included which involved completing the phrase \"I pledge allegiance to the...\" in which patient responded with \"to the [place].\" Alireza also produced one error for repetition task. Error was made on longer, more complex phrase. Phonemic paraphasia was demonstrated with this production as well. Verbal fluency was assessed using [employer] " "image. Patient demonstrated the ability to describe the picture but produced fragmented phrases and minimal telegraphic speech. Expressive language abilities appear stronger than receptive language abilities but mild impairment is present.   Yes/No questions were given to Alireza. Alireza was able to answer simple yes/no questions but demonstrated greater difficulty with more complex yes/no questions including \"Does summer come after spring?\" and \"Do you put a shoe on before your sock\" in which he responded with \"sometimes.\" Alireza was able to follow simple commands but demonstrated greater difficulty with more complex and more then one step commands including \"before opening your mouth, touch your ear.\" Reading was intact, but the ability to follow complex reading commands was impaired. Alireza also demonstrated greater difficulty with writing/spelling tasks. When tasked to spell \"twist,\" patient spelled \"wwist.\" Alireza spent about 5 minutes trying to spell \"under the [place]\" in which he spelled \"under the black brige.\" He demonstrates strengths with reading and object recognition.   Alireza presents with mild expressive and receptive aphasia. He demonstrates greatest difficulty with writing/spelling and completing higher level, more complex tasks.       SLP Plan:  SLP Plan: Skilled SLP warranted in order to improve expressive/receptive and cognitive linguistic abilities for safe completion of ADL's. Without skilled intervention he will not improve.   SLP Frequency: 4x per week  Duration: 30 days  SLP Discharge Recommendations: Continue skilled SLP at next level   Next Treatment Priority: higher level receptive language tasks  Discussed POC: Patient, Caregiver/family  Discussed Risks/Benefits: Yes  Patient/Caregiver Agreeable: Yes   Medical staff made aware: Yes   Barriers: Comorbidities   Duration: 30 days    Goals:  Established on 03/26/25    LTG: Patient will improve receptive language abilities to " engage in conversations with familiar and unfamiliar communication partners and complete daily living activities.  STG:   Patient will answer complex questions with 90% accuracy with minimal cues to improve receptive language abilities.  Patient with complete complex 2-step directions with 90% accuracy with minimal cues to improve receptive language abilities.    General Visit Information:  Reason for Referral: Aphasia following stroke   Referred By: PT/OT/SLP 3/25/25 [place]   Current Admission: 3/24/25 - Alireza Bellamy is a 69 y.o. male on day 0 of admission presenting with Acute CVA (cerebrovascular accident) (Multi). Presented with slurred speech and left-sided weakness .  past medical history of HTN, HLD, CAD, and vertigo.   Prior to Session Communication: Care Coordinator (SLP)       Pain:  Ratin-10   Pt report: 0  Action taken: none needed    Cognition:  Orientation Level: Oriented X4     Motor Speech Production:  Motor Speech Production  Oral Motor : WFL  Automatic Speech: WFL  Intelligibility: WFL       Auditory Comprehension:   Auditory Comprehension  Yes/No Questions: Impaired  Basic Questions: WFL  Complex Questions: Impaired  Commands: Impaired  One Step Basic Commands: Within Functional Limits  Two Step Basic Commands: Within Functional Limits  Complex/Abstract Commands: Impaired  Object Identification: Within Functional Limits  Picture Identification: Within Functional Limits    Following Commands:  (Difficulty with 2 step commands. Able to follow simple commands > 75% of time)      Expressive language:   Verbal Expression  Primary Language: English  Confrontation Naming: Impaired  Repetition: Impaired  Affect: Within Functional Limits  [place]: Impaired     Treatment:   Treatment Provided: No     SLP Inpatient Education:  Learner family, patient   Barriers to Learning Communication limitations, Hearing problems   Method Verbal   Education - Topic ST provided patient education regarding role of  ST, purpose of assessment, clinical impressions, goals of care and recommendations for speech therapy    Outcome    2= meets goals/outcomes

## 2025-03-26 NOTE — PROGRESS NOTES
Occupational Therapy    Evaluation    Patient Name: Alireza Bellamy  MRN: [id number]  : 1955  Today's Date: 25  Time Calculation  Start Time: 955  Stop Time:   Time Calculation (min): 17 min     917/917-B    Assessment:  OT Assessment: pt presents with delayed processing of multistep commands, impacting ADL performance. Will benefit from con't skilled OT  Prognosis: Good  Barriers to Discharge Home: Caregiver assistance, Cognition needs  Caregiver Assistance: Caregiver assistance needed per identified barriers - however, level of patient's required assistance exceeds assistance available at home  Cognition Needs: Medication and/or medical management daily assist needed, Insight of patient limited regarding functional ability/needs  End of Session Patient Position: Bed, 3 rail up, Alarm on (family present)  OT Assessment Results: Decreased ADL status, Decreased cognition, Decreased safe judgment during ADL, Decreased endurance  Prognosis: Good    Plan:  Treatment Interventions: ADL retraining, Functional transfer training, Endurance training, Patient/family training  OT Frequency: 2 times per week  OT Discharge Recommendations: No OT needed after discharge, direct supervision with [facility] recommended  OT Recommended Transfer Status: Independent  OT - OK to Discharge: Yes  Treatment Interventions: ADL retraining, Functional transfer training, Endurance training, Patient/family training  Subjective     Current Problem:  1. Expressive aphasia  Transthoracic Echo (TTE) Complete    Transthoracic Echo (TTE) Complete      2. TIA (transient ischemic attack)  Transthoracic Echo (TTE) Complete    Transthoracic Echo (TTE) Complete      3. Acute CVA (cerebrovascular accident) (Multi)  Holter Or Event Cardiac Monitor    Holter Or Event Cardiac Monitor    apixaban (Eliquis) 5 mg tablet      4. Other cerebrovascular vasospasm and vasoconstriction  Holter Or Event Cardiac Monitor    Holter Or Event Cardiac Monitor             General:   OT Received On: 03/26/25  General  Reason for Referral: ADL impairment  Referred By: PT/OT/SLP 3/25/25 [place]  Past Medical History Relevant to Rehab: HTN, HLD, CVA, Arthritis, Anemia, Right TSA, Vertigo, Hearing loss, PCI, Chew, CTS, CABG x5, spinal stenosis, obesity, TKA, ankle fracture, Plavix  Family/Caregiver Present:  (Son and ex-wife present during evaluation. Able to participate with part of PLOF  questions.)  Co-Treatment: PT  Co-Treatment Reason: to maximize patient/staff safety  Prior to Session Communication: Care Coordinator  Patient Position Received: Bed, 3 rail up, Alarm on  General Comment: To ED 3/24/25 with left sided weakness and difficulty speaking. Telestroke with [facility] performed recommending TNK NIH -7. However when ED physician assessed patient, expressive aphasia had resolved. Familiy declined TNK. , troponin 23; CT 3/24/25 brain (-); 20% narrowing proximal Right ICA; MRI 3/25/25 Brain multifocal areas of diffusion restriction left parietal lobe; TTE 3/25/25 EF 35%;    Precautions:  Hearing/Visual Limitations: [place], glasses  Medical Precautions: Fall precautions           Pain:  Pain Assessment  Pain Assessment: 0-10  0-10 (Numeric) Pain Score: 0 - No pain  Objective     Cognition:  Overall Cognitive Status:  (flat affect)  Orientation Level: Oriented X4  Following Commands:  (follows one step commands with mild delay, demo difficulty following two step directions)  Processing Speed: Delayed             Home Living:  Home Living Comments: Per patient report resides with daughter and 2 grandchildren in 1 story home. 2 steps [facility] handrail available on both sides. Walk in shower with seat no grab bar.    Prior Function:  Hand Dominance: Right  Prior Function Comments: Per patient report independent in mobilty, ADLs and IADLs without assistive device. Denies any falls. Drives.           Activities of Daily Living:   Eating Assistance: Independent  Grooming Assistance:  Stand by  Bathing Assistance: Minimal  UE Dressing Assistance: Independent  LE Dressing Assistance: Stand by  Toileting Assistance with Device: Stand by  Functional Assistance: Stand by                         Activity Tolerance:  Endurance:  (good)           Bed Mobility/Transfers: Bed Mobility  Bed Mobility:  (Supine <> sit independent)  Transfers  Transfer:  (Sit <> stand at bed level independent)                Balance:  Static Sitting: good  Dynamic Sitting: good  Static Standing: good -  Dynamic Standing: good-      Vision:Vision - Basic Assessment  Current Vision: Wears glasses all the time        Sensation:  Light Touch: No apparent deficits    Strength:  Strength Comments: B UE 4/5 throughout            Extremities: RUE   RUE : Within Functional Limits and LUE   LUE: Within Functional Limits    Outcome Measures: [facility] Daily Activity  Putting on and taking off regular lower body clothing: A little  Bathing (including washing, rinsing, drying): A little  Putting on and taking off regular upper body clothing: None  Toileting, which includes using toilet, bedpan or urinal: A little  Taking care of personal grooming such as brushing teeth: A little  Eating Meals: None  Daily Activity - Total Score: 20    Education Documentation  ADL Training, taught by Keila Mancera OT at 3/26/2025  2:24 PM.  Learner: Patient  Readiness: Acceptance  Method: Explanation  Response: Verbalizes Understanding, Needs Reinforcement          EDUCATION:  Education  Education Comment: educated pt and family on recommendation to have assist with medication mgmt due to delay in processing and intermittent confusion    Goals:  Encounter Problems       Encounter Problems (Active)       OT Goals       pt will attend to ADL task x5 minutes with less than 3 vc's to redirect (Progressing)       Start:  03/26/25    Expected End:  04/09/25            Pt will follow 2 step directions with 75% accuracy (Progressing)       Start:  03/26/25     Expected End:  04/09/25            Pt will tolerate 10 minutes of functional activity with one rest break (Progressing)       Start:  03/26/25    Expected End:  04/09/25

## 2025-03-26 NOTE — CARE PLAN
The patient's goals for the shift include      The clinical goals for the shift include Patient will remain safe and comfortable throughout shift.

## 2025-03-26 NOTE — CARE PLAN
The patient's goals for the shift include  none stated    The clinical goals for the shift include Patient will remain safe and comfortable throughout shift.

## 2025-03-26 NOTE — DISCHARGE INSTRUCTIONS
If the office does not reach out to you about the Ziopatch (cardiac rhythm monitor) appointment, please call [phone] to arrange it.

## 2025-03-26 NOTE — PROGRESS NOTES
03/26/25 1145   Discharge Planning   Home or Post Acute Services In home services   Type of Home Care Services Home nursing visits;Home SLP   Expected Discharge Disposition Home H     Received message from case management that they were updated that PT/OT stated no needs on d/c. Patient does have recommendations for speech therapy. Spoke to patient on the phone to discuss HHC. Patient agreeable to HHC and prefers [facility] for speech and SN. Patient requesting I call his son, Thanh, to update him. Spoke to Thanh on the phone he has questions regarding if someone can call him once his dad is seen by cardiology. Thanh stated his mom will be picking his dad up once he is discharged, and would like her to be called when ready as well. Message sent to medical team/nursing to update them. Patient will need internal HHC orders when ready for d/c.

## 2025-03-26 NOTE — PROGRESS NOTES
"Alireza Bellamy is a 69 y.o. male on day 1 of admission presenting with Acute CVA (cerebrovascular accident) (Multi).    Subjective   No new symptoms, speech is still similar to previous       Objective     Last Recorded Vitals  Blood pressure 165/77, pulse 59, temperature 36.8 °C (98.2 °F), resp. rate 16, height 1.702 m (5' 7.01\"), weight 100 kg (221 lb 1.9 oz), SpO2 94%.    Physical Exam  Neurological Exam  Mildly impaired fluency, but naming and repetition are intact today. Difficulty with commands limited by hearing  EOM full range. Face, tongue are symmetric.  No tremor noted with rest, posture, or action. Normal finger to nose. 5/5 strength throughout.   Relevant Results    NIH Stroke Scale  1A. Level of Consciousness: Alert, Keenly Responsive  1B. Ask Month and Age: Both Questions Right  1C. Blink Eyes & Squeeze Hands: Performs Both Tasks  2. Best Gaze: Normal  3. Visual: No Visual Loss  4. Facial Palsy: Normal Symmetrical Movements  5A. Motor - Left Arm: No Drift  5B. Motor - Right Arm: No Drift  6A. Motor - Left Leg: No Drift  6B. Motor - Right Leg: No Drift  7. Limb Ataxia: Absent  8. Sensory Loss: Normal  9. Best Language: Mild-to-Moderate Aphasia  10. Dysarthria: Normal  11. Extinction and Inattention: No Abnormality  NIH Stroke Scale: 1           [place] Coma Scale  Best Eye Response: Spontaneous  Best Verbal Response: Oriented  Best Motor Response: Follows commands  [place] Coma Scale Score: 15                                  Assessment/Plan      Assessment & Plan  Acute CVA (cerebrovascular accident) (Multi)    Expressive aphasia    Impression:  Left parietal infarct, likely embolic stroke of undetermined source     Secondary diagnoses:  HTN, HLD, CAD s/p numerous stents and CABG, ischemic cardiomyopathy (?new diagnosis)     Recommend:   Loaded with Plavix 300 mg  Aspirin 81 mg + Plavix 75 mg for 3 weeks, then Plavix monotherapy  High intensity statin   Consider cardiology consult for " cardiomyopathy  Chidi on discharge  Followup with me         I spent 25 minutes in the professional and overall care of this patient.      Abraham Arias MD

## 2025-03-26 NOTE — PROGRESS NOTES
Alireza Bellamy is a 69 y.o. male on day 1 of admission presenting with Acute CVA (cerebrovascular accident) (Multi).      Subjective   Seen and examined   Clinically stable   No complaints   No Headache        Objective     Last Recorded Vitals  /77   Pulse 59   Temp 36.8 °C (98.2 °F)   Resp 16   Wt 100 kg (221 lb 1.9 oz)   SpO2 94%   Intake/Output last 3 Shifts:  No intake or output data in the 24 hours ending 03/26/25 1055    Admission Weight  Weight: 104 kg (230 lb) (03/24/25 2200)    Daily Weight  03/25/25 : 100 kg (221 lb 1.9 oz)    Image Results  ECG 12 lead  Normal sinus rhythm  Left axis deviation  Abnormal QRS-T angle, consider primary T wave abnormality  Abnormal ECG  When compared with ECG of 13-JUN-2024 22:17,  Questionable change in QRS duration      Physical Exam      General: Well-developed obese elderly male, in no acute distress  HEENT: AT, NC, no JVD, no lymphadenopathy, neck supple  Lungs: Clear, no wheezing, no crackles  Cardiac: Normal S1-S2, no murmur, no gallop  Abdomen: Soft, nontender, no distention, positive bowel sound  Extremities: No deformity, no edema, pulses intact, ROM intact  Neurological: Alert awake oriented x3, sensation intact, no focal deficit      Assessment & Plan  Acute CVA (cerebrovascular accident) (Multi)    Expressive aphasia      Alireza Bellamy is a 69 y.o. male with a past medical history of HTN, HLD, CAD, and vertigo who was admitted for the mgmt of following issues:    Left parietal infarct,  embolic:     Seen and examined   Presented with slurred speech and left-sided weakness,     MRI brain positive for left parietal infarct, likely embolic  Multifocal areas of diffusion restriction within the cortex and  subcortical white matter of the left parietal lobe. No hemorrhagic  conversion or mass effect.     MRA neck and head  Cervical and intracranial vasculature are within normal limits. No  large vessel occlusion, aneurysm, or high-grade  stenosis.    Echocardiogram   1. The left ventricular systolic function is moderately decreased, with a visually estimated ejection fraction of 35%.   2. Multiple segmental abnormalities exist. See findings.   3. Abnormal wall motion.   4. Spectral Doppler shows a Grade II (pseudonormal pattern) of left ventricular diastolic filling with an elevated left atrial pressure.   5. There is normal right ventricular global systolic function.   6. Normal sized right ventricle.   7. There is no evidence of mitral valve stenosis.   8. Moderate to severe mitral valve regurgitation.   9. Mild tricuspid regurgitation is visualized.  10. Aortic valve stenosis is not present.  11. The main pulmonary artery is normal in size, and position, with normal bifurcation into the left and right pulmonary arteries.  12. There is moderately increased septal and mildly increased posterior left ventricular wall thickness.      Neurology is following   Cardiology was consulted   Eliquis PO BID 5 mg  for 21 days then Plavix daily per Neurology     HTN, HLD, CAD: Continue with home meds    CKD stage III: Cr baseline 1.61    VTE prophylaxis: Heparin subcu, SCDs    Disposition: TBD    Family at bedside and all the questions were answered      Thierno Jeronimo MD

## 2025-03-26 NOTE — PROGRESS NOTES
Speech-Language Pathology    Inpatient Speech-Language Pathology Clinical Swallow Evaluation       Patient Name: Alireza Bellamy  MRN: [id number]  : 1955  Patient Room: [address] [place] [address]  Today's Date: 25  Time Calculation  Start Time: 830  Stop Time: 0848  Time Calculation (min): 18 min         Recommendations:  Solid Diet Recommendations : Regular (IDDSI Level 7)   Liquid Diet Recommendations: Thin (IDDSI Level 0)         Medication Administration Recommendations: Whole (or however patient prefers)    Medical Staff Made Aware: Yes      Functional Oral Intake Scale: Level 7        total oral diet with no restrictions    Assessment:  Consistencies Trialed: Consistencies Trialed: Thin (IDDSI Level 0) - Straw, Regular (IDDSI Level 7), Pureed/extremely thick (IDDSI Level 4)   Assessment Results:   Pt presenting with functional swallow at bedside. Alireza was seen for a bedside swallow evaluation following a stroke. Prior to entering the room, bedside nurse reported that patient did not appear to have difficulty with swallowing but was unable to properly use straw yesterday and today. (Likely visual spatial deficits)   Alireza was placed upright in bed for swallow evaluation. Oral mechanism WFL. Oral phase of swallowing WFL. Mastication was timely and efficient. Oral cavity was cleared after swallow.  Pharyngeal phase of swallowing appeared WFL. No Overt s/s of aspiration present during bedside swallow evaluation with any consistency trialed. Patient did not report concerns with swallowing at breakfast. Silent aspiration is not highly suspected at this time.  Swallowing function appears intact. Recommending Regular and Thin.  Oral Mechanism Exam   Trigeminal Nerve (V)   Jaw open/close: Adequate     Bite:  Adequate    Sensation of face:  Adequate    Sensation of oral cavity:  Adequate    Sensation of tongue: Adequate    Facial Nerve (VII)    Symmetry at rest: Adequate    Eyebrow raise: Adequate    Lip pucker:  Adequate    Lip retraction/smile: Adequate      Cheek puffed: DNT  Taste: Adequate    Vagus (X)  Volitional cough: DNT  Hypoglossal (XII)       Lingual protrusion: Adequate    Tongue ROM/laterization, elevation: Adequate    Tongue strength: DNT  Dentition: Natural dentition   Respiratory Status: Room air        Plan:  SLP Plan: No skilled SLP Skilled speech therapy for dysphagia treatment is not warranted.  SLP Frequency: 4x per week   Duration: 30 days   Next Treatment Priority: higher level receptive language tasks   Discussed POC: Patient, Caregiver/family   Discussed Risks/Benefits: Yes   Patient/Caregiver Agreeable: Yes   Prognosis: Good  Barriers to improvement: none at this time   SLP Discharge Recommendations: unable to determine at this time, refer to subsequent notes    Subjective   Patient seen for bedside dysphagia evaluation following stroke.  Patient Positioning: Upright in Bed  Pain:  Ratin-10   Pt report: 0  Action taken: none needed    General Visit Information:  Pt. Admitted  3/24/2025  Past medical history: Alireza Bellamy is a 69 y.o. male on day 0 of admission presenting with Acute CVA (cerebrovascular accident) (Multi). PMHx includes HTN, HLD, CAD, and vertigo.  Living Environment: Home           Reason for Referral: Aphasia following stroke      Current Diet : Adult Regular (IDDSI Level 7), Thin (IDDSI Level 0)   Prior to Session Communication: Care Coordinator (SLP)     Treatment Completed with evaluation:   No      SLP Inpatient Education:  Learner patient, significant others   Barriers to Learning None   Method Verbal   Education - Topic ST provided patient education regarding role of ST, purpose of assessment, clinical impressions, goals of care and diet recommendations    Outcome    2= meets goals/outcomes

## 2025-03-27 ENCOUNTER — DOCUMENTATION (OUTPATIENT)
Dept: HOME HEALTH SERVICES | Facility: HOME HEALTH | Age: 70
End: 2025-03-27
Payer: MEDICARE

## 2025-03-27 ENCOUNTER — HOME HEALTH ADMISSION (OUTPATIENT)
Dept: HOME HEALTH SERVICES | Facility: HOME HEALTH | Age: 70
End: 2025-03-27
Payer: MEDICARE

## 2025-03-27 VITALS
DIASTOLIC BLOOD PRESSURE: 69 MMHG | BODY MASS INDEX: 34.71 KG/M2 | SYSTOLIC BLOOD PRESSURE: 125 MMHG | RESPIRATION RATE: 18 BRPM | HEART RATE: 61 BPM | OXYGEN SATURATION: 92 % | HEIGHT: 67 IN | TEMPERATURE: 98.8 F | WEIGHT: 221.12 LBS

## 2025-03-27 LAB
ANION GAP SERPL CALC-SCNC: 11 MMOL/L (ref 10–20)
BUN SERPL-MCNC: 31 MG/DL (ref 6–23)
CALCIUM SERPL-MCNC: 9.1 MG/DL (ref 8.6–10.3)
CHLORIDE SERPL-SCNC: 110 MMOL/L (ref 98–107)
CO2 SERPL-SCNC: 22 MMOL/L (ref 21–32)
CREAT SERPL-MCNC: 1.81 MG/DL (ref 0.5–1.3)
EGFRCR SERPLBLD CKD-EPI 2021: 39 ML/MIN/1.73M*2
ERYTHROCYTE [DISTWIDTH] IN BLOOD BY AUTOMATED COUNT: 14.4 % (ref 11.5–14.5)
GLUCOSE SERPL-MCNC: 103 MG/DL (ref 74–99)
HCT VFR BLD AUTO: 38.9 % (ref 41–52)
HGB BLD-MCNC: 13.4 G/DL (ref 13.5–17.5)
MCH RBC QN AUTO: 31.8 PG (ref 26–34)
MCHC RBC AUTO-ENTMCNC: 34.4 G/DL (ref 32–36)
MCV RBC AUTO: 92 FL (ref 80–100)
NRBC BLD-RTO: 0 /100 WBCS (ref 0–0)
PLATELET # BLD AUTO: 213 X10*3/UL (ref 150–450)
POTASSIUM SERPL-SCNC: 4.1 MMOL/L (ref 3.5–5.3)
RBC # BLD AUTO: 4.21 X10*6/UL (ref 4.5–5.9)
SODIUM SERPL-SCNC: 139 MMOL/L (ref 136–145)
WBC # BLD AUTO: 10.1 X10*3/UL (ref 4.4–11.3)

## 2025-03-27 PROCEDURE — 2500000001 HC RX 250 WO HCPCS SELF ADMINISTERED DRUGS (ALT 637 FOR MEDICARE OP): Performed by: STUDENT IN AN ORGANIZED HEALTH CARE EDUCATION/TRAINING PROGRAM

## 2025-03-27 PROCEDURE — 99223 1ST HOSP IP/OBS HIGH 75: CPT | Performed by: INTERNAL MEDICINE

## 2025-03-27 PROCEDURE — 80048 BASIC METABOLIC PNL TOTAL CA: CPT | Performed by: STUDENT IN AN ORGANIZED HEALTH CARE EDUCATION/TRAINING PROGRAM

## 2025-03-27 PROCEDURE — 36415 COLL VENOUS BLD VENIPUNCTURE: CPT | Performed by: STUDENT IN AN ORGANIZED HEALTH CARE EDUCATION/TRAINING PROGRAM

## 2025-03-27 PROCEDURE — 85027 COMPLETE CBC AUTOMATED: CPT | Performed by: STUDENT IN AN ORGANIZED HEALTH CARE EDUCATION/TRAINING PROGRAM

## 2025-03-27 PROCEDURE — 99239 HOSP IP/OBS DSCHRG MGMT >30: CPT | Performed by: HOSPITALIST

## 2025-03-27 PROCEDURE — 2500000001 HC RX 250 WO HCPCS SELF ADMINISTERED DRUGS (ALT 637 FOR MEDICARE OP): Performed by: HOSPITALIST

## 2025-03-27 RX ORDER — CLOPIDOGREL BISULFATE 75 MG/1
75 TABLET ORAL DAILY
Qty: 30 TABLET | Refills: 2 | Status: SHIPPED | OUTPATIENT
Start: 2025-04-10 | End: 2025-07-09

## 2025-03-27 RX ADMIN — CLOPIDOGREL 75 MG: 75 TABLET ORAL at 09:28

## 2025-03-27 RX ADMIN — ISOSORBIDE MONONITRATE 30 MG: 30 TABLET, EXTENDED RELEASE ORAL at 09:27

## 2025-03-27 RX ADMIN — CARVEDILOL 12.5 MG: 12.5 TABLET, FILM COATED ORAL at 09:27

## 2025-03-27 RX ADMIN — APIXABAN 5 MG: 5 TABLET, FILM COATED ORAL at 09:28

## 2025-03-27 ASSESSMENT — COGNITIVE AND FUNCTIONAL STATUS - GENERAL
WALKING IN HOSPITAL ROOM: A LITTLE
MOBILITY SCORE: 22
CLIMB 3 TO 5 STEPS WITH RAILING: A LITTLE
DAILY ACTIVITIY SCORE: 24

## 2025-03-27 ASSESSMENT — PAIN - FUNCTIONAL ASSESSMENT: PAIN_FUNCTIONAL_ASSESSMENT: 0-10

## 2025-03-27 ASSESSMENT — PAIN SCALES - GENERAL: PAINLEVEL_OUTOF10: 0 - NO PAIN

## 2025-03-27 NOTE — HH CARE COORDINATION
Home Care received a Referral for Nursing, Physical Therapy, Occupational Therapy, and Speech Language Pathology. We have processed the referral for a Start of Care on 03-28-25, 24-48 HOURS.     If you have any questions or concerns, please feel free to contact us at [phone]. Follow the prompts, enter your five digit zip code, and you will be directed to your care team on [facility].

## 2025-03-27 NOTE — DISCHARGE SUMMARY
Discharge Diagnosis  Acute CVA (cerebrovascular accident) (Multi)    Issues Requiring Follow-Up  Follow up with cardiology next month   Eliquis PO BID for 21 days then start Plavix daily     Discharge Meds     Medication List      START taking these medications     apixaban 5 mg tablet; Commonly known as: Eliquis; Take 1 tablet (5 mg)   by mouth 2 times a day.   clopidogrel 75 mg tablet; Commonly known as: Plavix; Take 1 tablet (75   mg) by mouth once daily. Do not fill before April 10, 2025.; Start taking   on: April 10, 2025     CONTINUE taking these medications     atorvastatin 80 mg tablet; Commonly known as: Lipitor; Take 1 tablet (80   mg) by mouth once daily.   carvedilol 12.5 mg tablet; Commonly known as: Coreg; Take 1 tablet (12.5   mg) by mouth 2 times daily (morning and late afternoon).   coenzyme Q-10 100 mg capsule   cyclobenzaprine 10 mg tablet; Commonly known as: Flexeril; Take 1 tablet   (10 mg) by mouth as needed at bedtime for muscle spasms for up to 20 days.   isosorbide mononitrate ER 30 mg 24 hr tablet; Commonly known as: Imdur;   Take 1 tablet (30 mg) by mouth once daily. Do not crush or chew.   losartan 25 mg tablet; Commonly known as: Cozaar; Take 1 tablet (25 mg)   by mouth once daily.   nitroglycerin 0.4 mg SL tablet; Commonly known as: Nitrostat; Place 1   tablet (0.4 mg) under the tongue every 5 minutes if needed for chest pain.     STOP taking these medications     Aleve 220 mg tablet; Generic drug: naproxen sodium   aspirin 81 mg EC tablet       Test Results Pending At Discharge  Pending Labs       No current pending labs.            Hospital Course   Acute CVA (cerebrovascular accident) (Multi)     Expressive aphasia        Alireza Bellamy is a 69 y.o. male with a past medical history of HTN, HLD, CAD, and vertigo who was admitted for the mgmt of following issues:     Left parietal infarct,  embolic:      Seen and examined   Presented with slurred speech and left-sided weakness,       MRI brain positive for left parietal infarct, likely embolic  Multifocal areas of diffusion restriction within the cortex and  subcortical white matter of the left parietal lobe. No hemorrhagic  conversion or mass effect.     MRA neck and head  Cervical and intracranial vasculature are within normal limits. No  large vessel occlusion, aneurysm, or high-grade stenosis.     Echocardiogram   1. The left ventricular systolic function is moderately decreased, with a visually estimated ejection fraction of 35%.   2. Multiple segmental abnormalities exist. See findings.   3. Abnormal wall motion.   4. Spectral Doppler shows a Grade II (pseudonormal pattern) of left ventricular diastolic filling with an elevated left atrial pressure.   5. There is normal right ventricular global systolic function.   6. Normal sized right ventricle.   7. There is no evidence of mitral valve stenosis.   8. Moderate to severe mitral valve regurgitation.   9. Mild tricuspid regurgitation is visualized.  10. Aortic valve stenosis is not present.  11. The main pulmonary artery is normal in size, and position, with normal bifurcation into the left and right pulmonary arteries.  12. There is moderately increased septal and mildly increased posterior left ventricular wall thickness.        Neurology is following   Cardiology was consulted   Follow up with cardiology next month for Work up   Eliquis PO BID 5 mg  for 21 days then Plavix daily per Neurology      HTN, HLD, CAD: Continue with home meds     CKD stage III: Cr baseline 1.61     VTE prophylaxis: Heparin subcu, SCDs     Disposition: Home with home health         Pertinent Physical Exam At Time of Discharge  Physical Exam  General: Well-developed obese elderly male, in no acute distress  HEENT: AT, NC, no JVD, no lymphadenopathy, neck supple  Lungs: Clear, no wheezing, no crackles  Cardiac: Normal S1-S2, no murmur, no gallop  Abdomen: Soft, nontender, no distention, positive bowel  sound  Extremities: No deformity, no edema, pulses intact, ROM intact  Neurological: Alert awake oriented x3, sensation intact, no focal deficit  Outpatient Follow-Up  Future Appointments   Date Time Provider Department Center   7/15/2025  8:15 AM Deborah Oates MD [id number]     Discharge time>30 min     Thierno Jeronimo MD

## 2025-03-27 NOTE — CARE PLAN
The patient's goals for the shift include  sleep and comfort.    The clinical goals for the shift include Patient will remain safe and comfortable throughout shift.    Over the shift, the patient did not make progress toward the following goals. Barriers to progression include pt a falls risk. Recommendations to address these barriers include standby assist when pt want to get up.

## 2025-03-27 NOTE — PROGRESS NOTES
Pt has a dc order and was agreeable to [facility] when interviewed by [facility]. [facility] intake nurse MODESTA Tapia @ [facility] updated. Bedside RN updated as well.     1525 Per [facility] SOC 24-48 hours.

## 2025-03-27 NOTE — CONSULTS
Inpatient consult to Cardiology  Consult performed by: Petr Butt MD  Consult ordered by: Thierno Jeronimo MD  Reason for consult: CVA, Abnormal echocardiogram      Cardiology Consult Note      Date:   3/27/2025  Patient name:  Alireza Bellamy  Date of admission:  3/24/2025  9:57 PM  MRN:   [id number]  YOB: 1955  Time of Consult:  11:51 AM    Consulting Cardiologist: Dr. Petr Butt          Admission Diagnosis:     Acute CVA (cerebrovascular accident) (Multi)      History of Present Illness:      Alireza Bellamy is a 69 y.o.  male patient with a history of hypertension, dyslipidemia, CKD stage III, as well as a prior history of CAD with usually follows with Dr. Oates.  He presented with acute strokelike symptoms with confusion and some speech impairment and subsequently had an echocardiogram done question and was diagnosed with left parietal infarct that was felt to be embolic on the MRI.  He also had some mild left-sided weakness which appears to have improved and was felt to have a TIA rather than a full CVA.  He had an echocardiogram done this admission that showed moderately reduced LV function with EF of 35% and multiple regional wall motion abnormalities.  There was moderate to severe mitral regurgitation with mild tricuspid regurgitation.  Based on these findings, cardiology was consulted for further evaluation.    The patient denied any ongoing chest pain or significant shortness of breath.  He also denied palpitations presyncope or syncope.    Labs show a mildly elevated troponin at 23 and BNP is 180 EKG shows sinus rhythm with no acute ST or T wave changes    He has a prior history of CABG with last cardiac catheterization in 2023 which showed severe 80% stenosis in the left circumflex OM1 treated with drug-eluting stents.  The vein graft to OM1 had 90% stenosis that was also treated with PCI and stenting      Past Medical History:     Past Medical History:   Diagnosis  Date    Arthritis     Coronary artery disease     Hearing aid worn     bilateral    Heart disease     HL (hearing loss)     Hyperlipidemia     Hypertension     Joint pain     Nephrolithiasis     PONV (postoperative nausea and vomiting)     Vertigo     Wears glasses        Past Surgical History:     Past Surgical History:   Procedure Laterality Date    ANKLE FRACTURE SURGERY Left     CARDIAC CATHETERIZATION  09/27/2023    4 VALENTINA to Circ    CARDIAC CATHETERIZATION N/A 10/05/2023    Procedure: PCI VALENTINA Stent- Coronary;  Surgeon: Deborah Oates MD;  Location: [facility];  Service: Cardiovascular;  Laterality: N/A;  PCI of the SVG to OM1 and OM2. IV NS 75/hr for 2 hours prior to procedure. BMP on admit.    CARDIAC CATHETERIZATION N/A 10/05/2023    Procedure: Left Heart Cath, No LV;  Surgeon: Deborah Oates MD;  Location: [facility];  Service: Cardiovascular;  Laterality: N/A;    CARPAL TUNNEL RELEASE      COLONOSCOPY      CORONARY ANGIOPLASTY  09/27/2023    4 VALENTINA to Circ    CORONARY ARTERY BYPASS GRAFT  2008    CORONARY STENT PLACEMENT      CYSTOSCOPY      JOINT REPLACEMENT Bilateral     LEG SURGERY Left     left lower leg fracture    LITHOTRIPSY      SHOULDER SURGERY Right 06/2024    SINUS SURGERY      TOTAL KNEE ARTHROPLASTY Bilateral     TOTAL SHOULDER ARTHROPLASTY Left        Family History:     Family History   Problem Relation Name Age of Onset    Coronary artery disease Mother      Other (PTCA) Mother      Leukemia Father         Social History:     Social History     Tobacco Use    Smoking status: Never    Smokeless tobacco: Current     Types: Chew   Vaping Use    Vaping status: Never Used   Substance Use Topics    Alcohol use: Never    Drug use: Never       Allergies:     No Known Allergies    CURRENT HOSPITAL MEDICATIONS    apixaban, 5 mg, oral, BID  atorvastatin, 80 mg, oral, Nightly  carvedilol, 12.5 mg, oral, BID  clopidogrel, 75 mg, oral, Daily  isosorbide mononitrate ER, 30 mg, oral,  Daily  [Held by provider] losartan, 25 mg, oral, Daily         Current Outpatient Medications   Medication Instructions    apixaban (ELIQUIS) 5 mg, oral, 2 times daily    aspirin 81 mg, Daily    atorvastatin (LIPITOR) 80 mg, oral, Daily    carvedilol (COREG) 12.5 mg, oral, 2 times daily (morning and late afternoon)    coenzyme Q-10 100 mg, Daily    cyclobenzaprine (FLEXERIL) 10 mg, oral, Nightly PRN    isosorbide mononitrate ER (IMDUR) 30 mg, oral, Daily, Do not crush or chew.    losartan (COZAAR) 25 mg, oral, Daily    naproxen sodium (ALEVE) 220 mg, oral, Every 12 hours PRN    nitroglycerin (NITROSTAT) 0.4 mg, sublingual, Every 5 min PRN        Review of Systems:      12 point review of systems was obtained in detail and is negative other than that detailed above.    Vital Signs:     Vitals:    03/26/25 0734 03/26/25 1453 03/26/25 2119 03/27/25 0723   BP: 165/77 127/69 133/74 146/81   BP Location:    Left arm   Patient Position:    Lying   Pulse: 59 60 59 62   Resp:    15   Temp: 36.8 °C (98.2 °F) 37.2 °C (99 °F) 36.9 °C (98.4 °F) 36.8 °C (98.2 °F)   TempSrc:    Temporal   SpO2: 94% 93% 91% 95%   Weight:       Height:         No intake or output data in the 24 hours ending 03/27/25 1151    Wt Readings from Last 4 Encounters:   03/25/25 100 kg (221 lb 1.9 oz)   10/15/24 97.5 kg (215 lb)   06/13/24 94.3 kg (207 lb 14.3 oz)   05/31/24 96.9 kg (213 lb 10 oz)       Physical Examination:     GENERAL APPEARANCE: Well developed, well nourished, in no acute distress.  CHEST: Symmetric and non-tender.  INTEGUMENT: Skin warm and dry, without gross excoriationis or lesions.  HEENT: No gross abnormalities of conjunctiva, teeth, gums, oral mucosa  NECK: Supple, no JVD, no bruit. Thyroid not palpable. Carotid upstrokes normal.  NEURO/PSHCY: Alert and oriented x3; appropriate behavior and responses and responses, grossly normal cerebellar function with normal balance and coordination  LUNGS: Clear to auscultation bilaterally;  "normal respiratory effort.  HEART: Rate and rhythm regular with no evident murmur; no gallop appreciated. There are no rubs, clicks or heaves. PMI nondisplaced.  ABDOMEN: Soft, nontender, no palpable hepatosplenomegaly, no mases, no bruits. Abdominal aorta not noted to be enlarged.  MUSCULOSKELETAL: Ambulatory with normal tandem gait.  EXTREMITIES: Warm with good color, no clubbing or cyanois. There is no edema noted.  PERIPHERAL VASCULAR: Pulses present and equally palpable; 2+ throughout. No femoral bruits.      Lab:     CBC:   Lab Results   Component Value Date    WBC 10.1 03/27/2025    RBC 4.21 (L) 03/27/2025    HGB 13.4 (L) 03/27/2025    HCT 38.9 (L) 03/27/2025     03/27/2025        CMP:    Lab Results   Component Value Date     03/27/2025    K 4.1 03/27/2025     (H) 03/27/2025    CO2 22 03/27/2025    BUN 31 (H) 03/27/2025    CREATININE 1.81 (H) 03/27/2025    GLUCOSE 103 (H) 03/27/2025    CALCIUM 9.1 03/27/2025       Magnesium:    No results found for: \"MG\"    Lipid Profile:    Lab Results   Component Value Date    TRIG 238 (H) 03/24/2025    HDL 28.6 03/24/2025    LDLCALC 38 03/24/2025       TSH:    No results found for: \"TSH\"    BNP:   Lab Results   Component Value Date     (H) 03/24/2025        PT/INR:    Lab Results   Component Value Date    PROTIME 11.7 03/24/2025    INR 1.1 03/24/2025       HgBA1c:    Lab Results   Component Value Date    HGBA1C 6.3 (H) 03/24/2025       BMP:  Lab Results   Component Value Date     03/27/2025     03/26/2025     03/24/2025    K 4.1 03/27/2025    K 3.9 03/26/2025    K 4.0 03/24/2025     (H) 03/27/2025     (H) 03/26/2025     03/24/2025    CO2 22 03/27/2025    CO2 25 03/26/2025    CO2 26 03/24/2025    BUN 31 (H) 03/27/2025    BUN 26 (H) 03/26/2025    BUN 32 (H) 03/24/2025    CREATININE 1.81 (H) 03/27/2025    CREATININE 1.61 (H) 03/26/2025    CREATININE 1.68 (H) 03/24/2025       CBC:  Lab Results   Component Value " Date    WBC 10.1 03/27/2025    WBC 9.5 03/26/2025    WBC 11.4 (H) 03/24/2025    RBC 4.21 (L) 03/27/2025    RBC 4.16 (L) 03/26/2025    RBC 4.48 (L) 03/24/2025    HGB 13.4 (L) 03/27/2025    HGB 12.9 (L) 03/26/2025    HGB 14.4 03/24/2025    HCT 38.9 (L) 03/27/2025    HCT 38.6 (L) 03/26/2025    HCT 42.5 03/24/2025    MCV 92 03/27/2025    MCV 93 03/26/2025    MCV 95 03/24/2025    MCH 31.8 03/27/2025    MCH 31.0 03/26/2025    MCH 32.1 03/24/2025    MCHC 34.4 03/27/2025    MCHC 33.4 03/26/2025    MCHC 33.9 03/24/2025    RDW 14.4 03/27/2025    RDW 14.3 03/26/2025    RDW 14.2 03/24/2025     03/27/2025     03/26/2025     03/24/2025       Cardiac Enzymes:    Lab Results   Component Value Date    TROPHS 23 (H) 03/24/2025    TROPHS 10,650 (H) 09/24/2023    TROPHS 9,243 (H) 09/23/2023       Hepatic Function Panel:    Lab Results   Component Value Date    ALKPHOS 88 03/24/2025    ALT 17 03/24/2025    AST 15 03/24/2025    PROT 7.2 03/24/2025    BILITOT 1.3 (H) 03/24/2025       Diagnostic Studies:     Transthoracic Echo (TTE) Complete    Result Date: 3/25/2025          [place] [id number]  Tel [phone] Fax [phone] TRANSTHORACIC ECHOCARDIOGRAM REPORT Patient Name:       YARITZA La Physician:    [id number] Ever Davila DO Study Date:         3/25/2025           Ordering Provider:    [id number] MATTEO FLORES MRN/PID:            [id number]            Fellow: Accession#:         [id number]        Nurse:                Nicola Matta RN Date of Birth/Age:  1955 / 69      Sonographer:          Tamy pizarro RDCS Gender Assigned at  M                   Additional Staff: Birth: Height:             170.18 cm           Admit Date:           3/24/2025 Weight:              122.93 kg           Admission Status:     Inpatient -                                                               Routine BSA / BMI:          2.30 m2 / 42.44     Department Location:  [facility]                     kg/m2                                     Echo Lab Blood Pressure: 118 /59 mmHg Study Type:    TRANSTHORACIC ECHO (TTE) COMPLETE Diagnosis/ICD: Transient cerebral ischemic attack, unspecified (NOT on                LCD)-G45.9 Indication:    Transischemic Attack CPT Codes:     Echo Complete w Full [id number] Patient History: Pertinent History: HTN, Hyperlipidemia, CAD and TIA. Study Detail: The following Echo studies were performed: 2D, M-Mode, Doppler and               color flow. Definity used as a contrast agent for endocardial               border definition and agitated saline used as a contrast agent for               intraseptal flow evaluation. Total contrast used for this               procedure was 2 mL via IV push. The patient was awake.  PHYSICIAN INTERPRETATION: Left Ventricle: The left ventricular systolic function is moderately decreased, with a visually estimated ejection fraction of 35%. There is mild to moderate concentric left ventricular hypertrophy. Wall motion is abnormal. The left ventricular cavity size is normal. There is moderately increased septal and mildly increased posterior left ventricular wall thickness. Spectral Doppler shows a Grade II (pseudonormal pattern) of left ventricular diastolic filling with an elevated left atrial pressure. LV Wall Scoring: The entire apex, mid and apical anterior wall, mid and apical inferior septum, mid and apical inferior wall, and mid anterolateral segment are hypokinetic. Left Atrium: The left atrial size is mildly dilated. A bubble study using agitated saline was performed. Bubble study is negative. Right Ventricle: The right ventricle is normal in size. There is normal right ventricular global systolic function. Right Atrium: The  right atrial size is normal. Aortic Valve: The aortic valve appears structurally normal. There is mild aortic valve cusp calcification. There is no evidence of aortic valve stenosis. The aortic valve dimensionless index is 0.81. There is no evidence of aortic valve regurgitation. The peak instantaneous gradient of the aortic valve is 5 mmHg. The mean gradient of the aortic valve is 3 mmHg. Mitral Valve: The mitral valve is normal in structure. There is no evidence of mitral valve stenosis. There is normal mitral valve leaflet mobility. There is mild mitral annular calcification. There is moderate to severe mitral valve regurgitation. The mitral regurgitant orifice area is 25 mm2. The mitral regurgitant volume is 58.08 ml. Tricuspid Valve: The tricuspid valve is structurally normal. There is normal tricuspid valve leaflet mobility. There is mild tricuspid regurgitation. Pulmonic Valve: The pulmonic valve is structurally normal. There is no indication of pulmonic valve regurgitation. Pericardium: No pericardial effusion noted. Aorta: The aortic root is normal. Pulmonary Artery: The main pulmonary artery is normal in size, and position, with normal bifurcation into the left and right pulmonary arteries. The tricuspid regurgitant velocity is 2.59 m/s, and with an estimated right atrial pressure of 3 mmHg, the estimated pulmonary artery pressure is mildly elevated with the RVSP at 29.8 mmHg. Systemic Veins: The inferior vena cava appears normal in size. In comparison to the previous echocardiogram(s): The left ventricular function is unchanged. The left ventricular diastolic function is worse.  CONCLUSIONS:  1. The left ventricular systolic function is moderately decreased, with a visually estimated ejection fraction of 35%.  2. Multiple segmental abnormalities exist. See findings.  3. Abnormal wall motion.  4. Spectral Doppler shows a Grade II (pseudonormal pattern) of left ventricular diastolic filling with an elevated  left atrial pressure.  5. There is normal right ventricular global systolic function.  6. Normal sized right ventricle.  7. There is no evidence of mitral valve stenosis.  8. Moderate to severe mitral valve regurgitation.  9. Mild tricuspid regurgitation is visualized. 10. Aortic valve stenosis is not present. 11. The main pulmonary artery is normal in size, and position, with normal bifurcation into the left and right pulmonary arteries. 12. There is moderately increased septal and mildly increased posterior left ventricular wall thickness. RECOMMENDATIONS: Transthoracic echo has low negative predictive value for mass, vegetation, or embolic source. Consider LEANDER or MRI if clinically indicated.  QUANTITATIVE DATA SUMMARY:  2D MEASUREMENTS:              Normal Ranges: Ao Root d:       3.49 cm      (2.0-3.7cm) IVSd:            1.39 cm      (0.6-1.1cm) LVPWd:           1.19 cm      (0.6-1.1cm) LVIDd:           5.34 cm      (3.9-5.9cm) LVIDs:           4.19 cm LV Mass Index:   124.8 g/m2 LVEDV Index:     105.81 ml/m2 LV % FS          21.5 %  LEFT ATRIUM:                  Normal Ranges: LA Vol A4C:        111.0 ml   (22+/-6mL/m2) LA Vol A2C:        116.1 ml LA Vol BP:         115.4 ml LA Vol Index A4C:  48.2ml/m2 LA Vol Index A2C:  50.5 ml/m2 LA Vol Index BP:   50.1 ml/m2 LA Area A4C:       30.1 cm2 LA Area A2C:       30.3 cm2 LA Major Axis A4C: 6.9 cm LA Major Axis A2C: 6.7 cm LA Volume Index:   49.1 ml/m2  RIGHT ATRIUM:                 Normal Ranges: RA Vol A4C:        40.4 ml    (8.3-19.5ml) RA Vol Index A4C:  17.6 ml/m2 RA Area A4C:       17.3 cm2 RA Major Axis A4C: 6.3 cm  AORTA MEASUREMENTS:         Normal Ranges: Asc Ao, d:          3.11 cm (2.1-3.4cm)  LV SYSTOLIC FUNCTION:                      Normal Ranges: EF-A4C View:    41 % (>=55%) EF-A2C View:    43 % EF-Biplane:     42 % EF-Visual:      35 % LV EF Reported: 35 %  LV DIASTOLIC FUNCTION:           Normal Ranges: MV Peak E:             0.84 m/s  (0.7-1.2  m/s) MV Peak A:             0.83 m/s  (0.42-0.7 m/s) E/A Ratio:             1.01      (1.0-2.2) MV e'                  0.085 m/s (>8.0) MV lateral e'          0.10 m/s MV medial e'           0.07 m/s E/e' Ratio:            9.97      (<8.0)  MITRAL VALVE:          Normal Ranges: MV DT:        323 msec (150-240msec)  MITRAL INSUFFICIENCY:             Normal Ranges: PISA Radius:          0.9 cm MR VTI:               236.50 cm MR Vmax:              581.00 cm/s MR Alias Chilo:         30.8 cm/s MR Volume:            58.08 ml MR Flow Rt:           142.69 ml/s MR EROA:              25 mm2  AORTIC VALVE:                     Normal Ranges: AoV Vmax:                1.13 m/s (<=1.7m/s) AoV Peak P.1 mmHg (<20mmHg) AoV Mean PG:             3.0 mmHg (1.7-11.5mmHg) LVOT Max Chilo:            0.84 m/s (<=1.1m/s) AoV VTI:                 25.90 cm (18-25cm) LVOT VTI:                21.00 cm LVOT Diameter:           2.10 cm  (1.8-2.4cm) AoV Area, VTI:           2.81 cm2 (2.5-5.5cm2) AoV Area,Vmax:           2.58 cm2 (2.5-4.5cm2) AoV Dimensionless Index: 0.81  RIGHT VENTRICLE: RV Basal 3.97 cm RV Mid   3.26 cm RV Major 8.5 cm TAPSE:   21.4 mm RV s'    0.13 m/s  TRICUSPID VALVE/RVSP:          Normal Ranges: Peak TR Velocity:     2.59 m/s RV Syst Pressure:     30 mmHg  (< 30mmHg) IVC Diam:             1.42 cm  PULMONIC VALVE:          Normal Ranges: PV Accel Time:  103 msec (>120ms) PV Max Chilo:     0.8 m/s  (0.6-0.9m/s) PV Max P.9 mmHg  Heaven Davila DO Electronically signed on 3/25/2025 at 12:05:55 PM  Wall Scoring  ** Final **     ECG 12 lead    Result Date: 3/25/2025  Normal sinus rhythm Left axis deviation Abnormal QRS-T angle, consider primary T wave abnormality Abnormal ECG When compared with ECG of 2024 22:17, Questionable change in QRS duration See ED provider note for full interpretation and clinical correlation Confirmed by Gisela Jensen ([id number]) on 3/25/2025 10:35:25 AM    MR brain wo  IV contrast    Result Date: 3/25/2025  Interpreted By:  Felipa White, STUDY: MR ANGIO HEAD WO IV CONTRAST; MR BRAIN WO IV CONTRAST; MR ANGIO NECK WO IV CONTRAST   INDICATION: Signs/Symptoms:CVA   COMPARISON: Head CT 03/24/2025   ACCESSION NUMBER(S): [id number]; [id number]; [id number]   ORDERING CLINICIAN: MARC DUTTON   TECHNIQUE: Multi-planar multi-sequential MR imaging of the brain was performed without intravenous contrast. MRA of the head using time-of-flight technique was performed without intravenous contrast. MRA of the neck using time-of-flight technique was performed without intravenous contrast.   FINDINGS: MRI BRAIN: Linear areas of diffusion restriction within the cortex and subcortical white matter of the left parietal lobe (series 7 image 25 through 29). No mass effect or midline shift. No hemorrhagic conversion.   No intracranial mass. Mild diffuse parenchymal volume loss.   No hydrocephalus.  No extra-axial fluid collections.   The visualized intraorbital contents are normal. Moderate mucosal disease of the ethmoid air cells and left sphenoid sinus. The mastoid air cells are clear. The visualized osseous structures, soft tissues and partially visualized parotid glands appear normal.   MRA NECK:   Standard configuration of the aortic arch with no gross ostial stenosis of the great vessels.   The common carotid arteries are patent bilaterally without evidence of stenosis. There is no narrowing of the cervical internal carotid arteries bilaterally.   The vertebral arteries are patent bilaterally throughout their course.   MRA HEAD:   Normal distal internal carotid arteries.   The proximal anterior, middle and posterior cerebral arteries are patent bilaterally.   Normal vertebrobasilar system.   No evidence of vascular stenosis, occlusion, aneurysm or vascular malformation.       Multifocal areas of diffusion restriction within the cortex and subcortical white matter of the left parietal lobe.  No hemorrhagic conversion or mass effect.   Cervical and intracranial vasculature are within normal limits. No large vessel occlusion, aneurysm, or high-grade stenosis.   _____________ NASCET criteria for internal carotid artery stenosis: Mild: 0% to 49% Moderate: 50% to 69% Severe: 70% to 99% Complete Occlusion   Signed by: Felipa White 3/25/2025 9:55 AM Dictation workstation:   [id number]    MR angio head wo IV contrast    Result Date: 3/25/2025  Interpreted By:  Felipa White, STUDY: MR ANGIO HEAD WO IV CONTRAST; MR BRAIN WO IV CONTRAST; MR ANGIO NECK WO IV CONTRAST   INDICATION: Signs/Symptoms:CVA   COMPARISON: Head CT 03/24/2025   ACCESSION NUMBER(S): [id number]; [id number]; [id number]   ORDERING CLINICIAN: MARC DUTTON   TECHNIQUE: Multi-planar multi-sequential MR imaging of the brain was performed without intravenous contrast. MRA of the head using time-of-flight technique was performed without intravenous contrast. MRA of the neck using time-of-flight technique was performed without intravenous contrast.   FINDINGS: MRI BRAIN: Linear areas of diffusion restriction within the cortex and subcortical white matter of the left parietal lobe (series 7 image 25 through 29). No mass effect or midline shift. No hemorrhagic conversion.   No intracranial mass. Mild diffuse parenchymal volume loss.   No hydrocephalus.  No extra-axial fluid collections.   The visualized intraorbital contents are normal. Moderate mucosal disease of the ethmoid air cells and left sphenoid sinus. The mastoid air cells are clear. The visualized osseous structures, soft tissues and partially visualized parotid glands appear normal.   MRA NECK:   Standard configuration of the aortic arch with no gross ostial stenosis of the great vessels.   The common carotid arteries are patent bilaterally without evidence of stenosis. There is no narrowing of the cervical internal carotid arteries bilaterally.   The vertebral arteries are patent  bilaterally throughout their course.   MRA HEAD:   Normal distal internal carotid arteries.   The proximal anterior, middle and posterior cerebral arteries are patent bilaterally.   Normal vertebrobasilar system.   No evidence of vascular stenosis, occlusion, aneurysm or vascular malformation.       Multifocal areas of diffusion restriction within the cortex and subcortical white matter of the left parietal lobe. No hemorrhagic conversion or mass effect.   Cervical and intracranial vasculature are within normal limits. No large vessel occlusion, aneurysm, or high-grade stenosis.   _____________ NASCET criteria for internal carotid artery stenosis: Mild: 0% to 49% Moderate: 50% to 69% Severe: 70% to 99% Complete Occlusion   Signed by: Felipa White 3/25/2025 9:55 AM Dictation workstation:   [id number]    MR angio neck wo IV contrast    Result Date: 3/25/2025  Interpreted By:  Felipa White, STUDY: MR ANGIO HEAD WO IV CONTRAST; MR BRAIN WO IV CONTRAST; MR ANGIO NECK WO IV CONTRAST   INDICATION: Signs/Symptoms:CVA   COMPARISON: Head CT 03/24/2025   ACCESSION NUMBER(S): [id number]; [id number]; [id number]   ORDERING CLINICIAN: MARC DUTTON   TECHNIQUE: Multi-planar multi-sequential MR imaging of the brain was performed without intravenous contrast. MRA of the head using time-of-flight technique was performed without intravenous contrast. MRA of the neck using time-of-flight technique was performed without intravenous contrast.   FINDINGS: MRI BRAIN: Linear areas of diffusion restriction within the cortex and subcortical white matter of the left parietal lobe (series 7 image 25 through 29). No mass effect or midline shift. No hemorrhagic conversion.   No intracranial mass. Mild diffuse parenchymal volume loss.   No hydrocephalus.  No extra-axial fluid collections.   The visualized intraorbital contents are normal. Moderate mucosal disease of the ethmoid air cells and left sphenoid sinus. The mastoid air cells  are clear. The visualized osseous structures, soft tissues and partially visualized parotid glands appear normal.   MRA NECK:   Standard configuration of the aortic arch with no gross ostial stenosis of the great vessels.   The common carotid arteries are patent bilaterally without evidence of stenosis. There is no narrowing of the cervical internal carotid arteries bilaterally.   The vertebral arteries are patent bilaterally throughout their course.   MRA HEAD:   Normal distal internal carotid arteries.   The proximal anterior, middle and posterior cerebral arteries are patent bilaterally.   Normal vertebrobasilar system.   No evidence of vascular stenosis, occlusion, aneurysm or vascular malformation.       Multifocal areas of diffusion restriction within the cortex and subcortical white matter of the left parietal lobe. No hemorrhagic conversion or mass effect.   Cervical and intracranial vasculature are within normal limits. No large vessel occlusion, aneurysm, or high-grade stenosis.   _____________ NASCET criteria for internal carotid artery stenosis: Mild: 0% to 49% Moderate: 50% to 69% Severe: 70% to 99% Complete Occlusion   Signed by: Felipa White 3/25/2025 9:55 AM Dictation workstation:   [id number]    CT brain attack angio head and neck W and WO IV contrast    Result Date: 3/24/2025  Interpreted By:  Jyoti West, STUDY: CT BRAIN ATTACK ANGIO HEAD AND NECK W AND WO IV CONTRAST;  3/24/2025 10:22 pm   INDICATION: Signs/Symptoms:let sided weakness.   COMPARISON: CT head 03/24/2025   ACCESSION NUMBER(S): [id number]   ORDERING CLINICIAN: ALLISON MEDELLIN   TECHNIQUE: Contrast was administered intravenously and axial images of the head and neck were acquired.  Coronal, sagittal, and 3-D reconstructions were provided for review.   FINDINGS: Extensive paranasal sinus inflammatory changes are again noted.   CTA HEAD FINDINGS:   Anterior circulation: Mild atherosclerotic calcifications of the bilateral carotid  siphons. The bilateral intracranial internal carotid arteries, bilateral carotid terminals, bilateral proximal anterior and middle cerebral arteries are normal.   Posterior circulation: Minimal atherosclerotic calcifications of the bilateral proximal intradural vertebral arteries. Fetal type origin of the right PCA is noted. Otherwise bilateral intracranial vertebral arteries, vertebrobasilar junction, basilar artery and proximal posterior cerebral arteries are normal.   CTA NECK FINDINGS:   Patient body habitus and bilateral shoulder arthroplasties, contribute to beam hardening in partially limited evaluation of the lower cervical spine. Status post median sternotomy.   Right carotid vessels: The common carotid artery is normal. Scattered atherosclerotic calcifications of the carotid bulb and proximal cervical ICA.. There is 20% stenosis  by NASCET criteria.   Left carotid vessels: The common carotid artery is normal. Scattered atherosclerotic calcifications of the carotid bulb and proximal cervical ICA. There is 0% stenosis  by NASCET criteria.   Vertebral vessels:  The visualized segments of the cervical vertebral arteries are normal in caliber.   Heterogeneous enlarged thyroid. Mild atherosclerotic calcifications of the visualized aortic arch.   Hazy opacities in the upper lung fields.       No evidence for significant stenosis of the cervical vessels. Up to 20% narrowing of the proximal right cervical ICA.   No evidence for significant stenosis or large branch vessel cutoffs of the intracranial vessels.   MACRO: None.   Signed by: Jyoti West 3/24/2025 10:39 PM Dictation workstation:   [id number]    CT brain attack head wo IV contrast    Result Date: 3/24/2025  Interpreted By:  Jyoti West, STUDY: CT BRAIN ATTACK HEAD WO IV CONTRAST;  3/24/2025 10:09 pm   INDICATION: Signs/Symptoms:Stroke Evaluation. Left-sided weakness and difficulty speaking. Currently on Plavix   COMPARISON: None   ACCESSION  NUMBER(S): [id number]   ORDERING CLINICIAN: ALLISON MEDELLIN   TECHNIQUE: Axial noncontrast CT images of the head.   FINDINGS: BRAIN PARENCHYMA: Gray-white matter interfaces are preserved. No mass effect or midline shift.   HEMORRHAGE: No acute intracranial hemorrhage. VENTRICLES and EXTRA-AXIAL SPACES: Normal size. EXTRACRANIAL SOFT TISSUES: Within normal limits. PARANASAL SINUSES/MASTOIDS: Hyperdense opacification of the sphenoid sinuses and posterior ethmoid air cells with scattered mucosal thickening and erosive changes. Polyp or mucous retention cyst in the left maxillary sinus. Mastoid air cells appear grossly patent. CALVARIUM: No depressed skull fracture. No destructive osseous lesion.   OTHER FINDINGS: None.       No acute intracranial hemorrhage or mass effect. MRI may be obtained as clinically indicated to evaluate for acute ischemic infarct.   Parenchymal volume loss and mild nonspecific white matter changes.   Chronic appearing paranasal sinus inflammatory changes. Underlying fungal process not excluded.   MACRO: Jyoti West discussed the significance and urgency of this critical finding by telephone with  ALLISON MEDELLIN on 3/24/2025 at 10:20 pm.  (**-RCF-**) Findings:  See findings.     Signed by: Jyoti West 3/24/2025 10:22 PM Dictation workstation:   [id number]      Radiology:     Transthoracic Echo (TTE) Complete   Final Result      MR brain wo IV contrast   Final Result   Multifocal areas of diffusion restriction within the cortex and   subcortical white matter of the left parietal lobe. No hemorrhagic   conversion or mass effect.        Cervical and intracranial vasculature are within normal limits. No   large vessel occlusion, aneurysm, or high-grade stenosis.        _____________   NASCET criteria for internal carotid artery stenosis:   Mild: 0% to 49%   Moderate: 50% to 69%   Severe: 70% to 99%   Complete Occlusion        Signed by: Felipa White 3/25/2025 9:55 AM   Dictation  workstation:   [id number]      MR angio head wo IV contrast   Final Result   Multifocal areas of diffusion restriction within the cortex and   subcortical white matter of the left parietal lobe. No hemorrhagic   conversion or mass effect.        Cervical and intracranial vasculature are within normal limits. No   large vessel occlusion, aneurysm, or high-grade stenosis.        _____________   NASCET criteria for internal carotid artery stenosis:   Mild: 0% to 49%   Moderate: 50% to 69%   Severe: 70% to 99%   Complete Occlusion        Signed by: Felipa White 3/25/2025 9:55 AM   Dictation workstation:   [id number]      MR angio neck wo IV contrast   Final Result   Multifocal areas of diffusion restriction within the cortex and   subcortical white matter of the left parietal lobe. No hemorrhagic   conversion or mass effect.        Cervical and intracranial vasculature are within normal limits. No   large vessel occlusion, aneurysm, or high-grade stenosis.        _____________   NASCET criteria for internal carotid artery stenosis:   Mild: 0% to 49%   Moderate: 50% to 69%   Severe: 70% to 99%   Complete Occlusion        Signed by: Felipa White 3/25/2025 9:55 AM   Dictation workstation:   [id number]      CT brain attack angio head and neck W and WO IV contrast   Final Result   No evidence for significant stenosis of the cervical vessels. Up to   20% narrowing of the proximal right cervical ICA.        No evidence for significant stenosis or large branch vessel cutoffs   of the intracranial vessels.        MACRO:   None.        Signed by: Jyoti West 3/24/2025 10:39 PM   Dictation workstation:   [id number]      CT brain attack head wo IV contrast   Final Result   No acute intracranial hemorrhage or mass effect. MRI may be obtained   as clinically indicated to evaluate for acute ischemic infarct.        Parenchymal volume loss and mild nonspecific white matter changes.        Chronic appearing paranasal sinus  inflammatory changes. Underlying   fungal process not excluded.        MACRO:   Jyoti West discussed the significance and urgency of this   critical finding by telephone with  ALLISON MEDELLIN on 3/24/2025 at   10:20 pm.  (**-RCF-**) Findings:  See findings.             Signed by: Jyoti West 3/24/2025 10:22 PM   Dictation workstation:   [id number]      Holter Or Event Cardiac Monitor    (Results Pending)       Problem List:     Patient Active Problem List   Diagnosis    CAD S/P percutaneous coronary angioplasty    Carpal tunnel syndrome    Essential hypertension    Hyperlipidemia    BMI 33.0-33.9,adult    Primary localized osteoarthrosis of shoulder region    S/P coronary artery bypass graft x 5    Chest pain    Never smoked tobacco    Class 1 obesity due to excess calories with body mass index (BMI) of 32.0 to 32.9 in adult    H/O total shoulder replacement, right    Anemia    Acute CVA (cerebrovascular accident) (Multi)    Expressive aphasia       ASSESSMENT & PLAN:   1.  Abnormal echocardiogram: With reduced LV function and regional wall motion abnormalities in patient with known history of CAD and CABG as noted above.    I have personally reviewed his echocardiogram and compared it to prior echo from September 2023 which shows similar findings with an EF of 30 to 35% and multiple regional wall motion abnormalities, with moderate to severe mitral regurgitation which is essentially unchanged on this present echocardiogram.  Since patient's troponin is only mildly elevated in the setting of an acute CVA and there are no acute ischemic EKG changes or chest pain, so we will recommend continued medical treatment at this with beta-blockers, Plavix and aspirin. The patient may benefit from invasive cardiac catheterization as an elective once his stroke symptoms have improved and stabilized.  Recommend resuming his losartan for heart failure indication and continue with Coreg.    2.  CVA: Felt to be embolic in  origin with unclear source.  Agree with current recommendation for DAPT for at least 21 days followed by aspirin monotherapy.  Patient may benefit from a LEANDER to rule out cardiac source of embolus, although this is not likely to change his management since he is on Eliquis.    3.  CAD s/p CABG and multivessel PCI with last cardiac catheterization in 2023 as noted above, with no recurrent chest pain.  Continue with beta-blockers and antiplatelet therapy.        Thank you for the opportunity to participate in the care of your patient.  Please do not hesitate to call if you have any questions.    Electronically signed by Petr Butt MD, on 3/27/2025 at 11:51 AM

## 2025-03-27 NOTE — CARE PLAN
The patient's goals for the shift include      The clinical goals for the shift include Patient will remain safe and comfortable throughout shift.    Over the shift, the patient did make progress toward the following goals.

## 2025-03-28 ENCOUNTER — HOME CARE VISIT (OUTPATIENT)
Dept: HOME HEALTH SERVICES | Facility: HOME HEALTH | Age: 70
End: 2025-03-28
Payer: MEDICARE

## 2025-03-28 ENCOUNTER — PATIENT OUTREACH (OUTPATIENT)
Dept: CARDIOLOGY | Facility: CLINIC | Age: 70
End: 2025-03-28
Payer: MEDICARE

## 2025-03-28 VITALS
OXYGEN SATURATION: 97 % | DIASTOLIC BLOOD PRESSURE: 80 MMHG | SYSTOLIC BLOOD PRESSURE: 143 MMHG | TEMPERATURE: 98.2 F | RESPIRATION RATE: 20 BRPM | HEART RATE: 60 BPM

## 2025-03-28 PROCEDURE — G0299 HHS/HOSPICE OF RN EA 15 MIN: HCPCS | Mod: HHH

## 2025-03-28 PROCEDURE — 169592 NO-PAY CLAIM PROCEDURE

## 2025-03-28 ASSESSMENT — ENCOUNTER SYMPTOMS
LAST BOWEL MOVEMENT: 67289
DENIES PAIN: 1
PERSON REPORTING PAIN: PATIENT
CHANGE IN APPETITE: UNCHANGED
CONSTIPATION: 1
MUSCLE WEAKNESS: 1
APPETITE LEVEL: GOOD

## 2025-03-28 ASSESSMENT — ACTIVITIES OF DAILY LIVING (ADL)
ENTERING_EXITING_HOME: NEEDS ASSISTANCE
OASIS_M1830: 03

## 2025-03-28 NOTE — PROGRESS NOTES
Discharge Facility:  [facility]    Discharge Diagnosis:  stroke    Admission Date:  3/25/25  Discharge Date:   3/27/25    PCP Appointment Date:  [facility] '  Specialist Appointment Date:   4/3/25 Neurosurgery  Appointment with Deborah Oates MD (04/15/2025)   If the office does not reach out to you about the Ziopatch (cardiac rhythm monitor) appointment, please call [phone] to arrange it.   Hospital Encounter and Summary Linked: Yes  ED to Hosp-Admission (Discharged) with Thierno Jeronimo MD; Alize Florentino MD; Dayo Holder MD (03/24/2025)     Discharge Summary by Thierno Jeronimo MD (03/27/2025 14:01)     Two attempts were made to reach patient within two business days after discharge. I left voicemail to introduce myself and the TCM program to Alireza Bellamy. I encouraged patient to contact office about ziopatch and keep follow up appts. I gave my contact information to return my call so we can go over discharge instructions and see if I can assist in anyway.

## 2025-03-29 ENCOUNTER — HOME CARE VISIT (OUTPATIENT)
Dept: HOME HEALTH SERVICES | Facility: HOME HEALTH | Age: 70
End: 2025-03-29
Payer: MEDICARE

## 2025-03-29 VITALS — DIASTOLIC BLOOD PRESSURE: 60 MMHG | HEART RATE: 64 BPM | SYSTOLIC BLOOD PRESSURE: 120 MMHG

## 2025-03-29 PROCEDURE — G0151 HHCP-SERV OF PT,EA 15 MIN: HCPCS | Mod: HHH

## 2025-03-29 SDOH — HEALTH STABILITY: PHYSICAL HEALTH
EXERCISE COMMENTS: INSTR IN WALKING PROGRAM AND RECOMBENT BIKE EVERY OTHER DAY START AT 5-10 MINUTES AND WORK UP TO 2 15 MIN SESSIONS

## 2025-03-29 ASSESSMENT — GAIT ASSESSMENTS
GAIT SCORE: 10
INITIATION OF GAIT IMMEDIATELY AFTER GO: 1 - NO HESITANCY
BALANCE AND GAIT SCORE: 25
STEP SYMMETRY: 1 - RIGHT AND LEFT STEP LENGTH APPEAR EQUAL
STEP CONTINUITY: 1 - STEPS APPEAR CONTINUOUS
WALKING STANCE: 0 - HEELS APART
PATH SCORE: 2
TRUNK SCORE: 1
PATH: 2 - STRAIGHT WITHOUT WALKING AID
TRUNK: 1 - NO SWAY BUT FLEXION OF KNEES OR BACK OR SPREADS ARMS WHILE WALKING

## 2025-03-29 ASSESSMENT — BALANCE ASSESSMENTS
ATTEMPTS TO ARISE: 2 - ABLE TO RISE, ONE ATTEMPT
ARISES: 1 - ABLE, USES ARMS TO HELP
BALANCE SCORE: 15
NUDGED: 2 - STEADY
NUDGED SCORE: 2
SITTING BALANCE: 1 - STEADY, SAFE
STANDING BALANCE: 2 - NARROW STANCE WITHOUT SUPPORT
IMMEDIATE STANDING BALANCE FIRST 5 SECONDS: 2 - STEADY WITHOUT WALKER OR OTHER SUPPORT
EYES CLOSED AT MAXIMUM POSITION NUDGED: 1 - STEADY
TURNING 360 DEGREES STEPS: 1 - CONTINUOUS STEPS
SITTING DOWN: 2 - SAFE, SMOOTH MOTION
ARISING SCORE: 1

## 2025-03-29 ASSESSMENT — ENCOUNTER SYMPTOMS
OCCASIONAL FEELINGS OF UNSTEADINESS: 0
PERSON REPORTING PAIN: PATIENT
HIGHEST PAIN SEVERITY IN PAST 24 HOURS: 7/10
PAIN: 1
PAIN LOCATION: BACK
PAIN LOCATION - PAIN SEVERITY: 5/10
SUBJECTIVE PAIN PROGRESSION: WAXING AND WANING
LOWEST PAIN SEVERITY IN PAST 24 HOURS: 5/10

## 2025-03-29 ASSESSMENT — ACTIVITIES OF DAILY LIVING (ADL)
AMBULATION_DISTANCE/DURATION_TOLERATED: 150 FT X 2
AMBULATION ASSISTANCE ON FLAT SURFACES: 1

## 2025-03-31 ENCOUNTER — HOME CARE VISIT (OUTPATIENT)
Dept: HOME HEALTH SERVICES | Facility: HOME HEALTH | Age: 70
End: 2025-03-31
Payer: MEDICARE

## 2025-03-31 PROCEDURE — G0152 HHCP-SERV OF OT,EA 15 MIN: HCPCS | Mod: HHH

## 2025-03-31 ASSESSMENT — ACTIVITIES OF DAILY LIVING (ADL)
DRESSING_LB_CURRENT_FUNCTION: SUPERVISION
BATHING ASSESSED: 1
BATHING_CURRENT_FUNCTION: SUPERVISION

## 2025-03-31 ASSESSMENT — ENCOUNTER SYMPTOMS
PERSON REPORTING PAIN: PATIENT
DENIES PAIN: 1

## 2025-04-01 ENCOUNTER — OFFICE VISIT (OUTPATIENT)
Dept: CARDIOLOGY | Facility: CLINIC | Age: 70
End: 2025-04-01
Payer: MEDICARE

## 2025-04-01 VITALS
SYSTOLIC BLOOD PRESSURE: 110 MMHG | WEIGHT: 219.4 LBS | DIASTOLIC BLOOD PRESSURE: 68 MMHG | HEIGHT: 67 IN | HEART RATE: 60 BPM | BODY MASS INDEX: 34.44 KG/M2

## 2025-04-01 DIAGNOSIS — I10 ESSENTIAL HYPERTENSION: ICD-10-CM

## 2025-04-01 DIAGNOSIS — I34.0 MODERATE MITRAL REGURGITATION: ICD-10-CM

## 2025-04-01 DIAGNOSIS — Z98.61 CAD S/P PERCUTANEOUS CORONARY ANGIOPLASTY: ICD-10-CM

## 2025-04-01 DIAGNOSIS — E78.2 MIXED HYPERLIPIDEMIA: ICD-10-CM

## 2025-04-01 DIAGNOSIS — Z78.9 NEVER SMOKED TOBACCO: ICD-10-CM

## 2025-04-01 DIAGNOSIS — I25.10 CAD S/P PERCUTANEOUS CORONARY ANGIOPLASTY: ICD-10-CM

## 2025-04-01 DIAGNOSIS — Z95.1 S/P CORONARY ARTERY BYPASS GRAFT X 5: ICD-10-CM

## 2025-04-01 DIAGNOSIS — I67.850 CEREBRAL AUTOSOMAL DOMINANT ARTERIOPATHY WITH SUBCORTICAL INFARCTS AND LEUKOENCEPHALOPATHY: ICD-10-CM

## 2025-04-01 DIAGNOSIS — I63.9 ACUTE CVA (CEREBROVASCULAR ACCIDENT) (MULTI): ICD-10-CM

## 2025-04-01 PROBLEM — E66.09 CLASS 1 OBESITY DUE TO EXCESS CALORIES WITH BODY MASS INDEX (BMI) OF 32.0 TO 32.9 IN ADULT: Status: RESOLVED | Noted: 2024-06-13 | Resolved: 2025-04-01

## 2025-04-01 PROBLEM — E66.811 CLASS 1 OBESITY DUE TO EXCESS CALORIES WITH BODY MASS INDEX (BMI) OF 32.0 TO 32.9 IN ADULT: Status: RESOLVED | Noted: 2024-06-13 | Resolved: 2025-04-01

## 2025-04-01 PROCEDURE — 1111F DSCHRG MED/CURRENT MED MERGE: CPT | Performed by: INTERNAL MEDICINE

## 2025-04-01 PROCEDURE — 3008F BODY MASS INDEX DOCD: CPT | Performed by: INTERNAL MEDICINE

## 2025-04-01 PROCEDURE — 99214 OFFICE O/P EST MOD 30 MIN: CPT | Performed by: INTERNAL MEDICINE

## 2025-04-01 PROCEDURE — 3074F SYST BP LT 130 MM HG: CPT | Performed by: INTERNAL MEDICINE

## 2025-04-01 PROCEDURE — 1159F MED LIST DOCD IN RCRD: CPT | Performed by: INTERNAL MEDICINE

## 2025-04-01 PROCEDURE — 3078F DIAST BP <80 MM HG: CPT | Performed by: INTERNAL MEDICINE

## 2025-04-01 RX ORDER — TIZANIDINE 2 MG/1
2 TABLET ORAL EVERY 8 HOURS PRN
COMMUNITY
Start: 2025-02-12

## 2025-04-01 NOTE — PATIENT INSTRUCTIONS
Patient to follow up after testing with Dr. Deborah Oates MD [facility]     Office will arrange 7 day ZIO patch in near future    In terms of dental extractions- would hold Eliquis x3 days pre extraction, or Plavix (Clopidogrel) x5 days pre extraction.     Please stay on Eliquis for the full 21 days, then transition to Plavix per neurology recommendations.     No other changes today.   Continue same medications and treatments.   Patient educated on proper medication use.   Patient educated on risk factor modification.   Please bring any lab results from other providers / physicians to your next appointment.     Please bring all medicines, vitamins, and herbal supplements with you when you come to the office.     Prescriptions will not be filled unless you are compliant with your follow up appointments or have a follow up appointment scheduled as per instruction of your physician. Refills should be requested at the time of your visit.    Rex LANCE RN am scribing for and in the presence of Dr. Deborah Oates MD [facility]

## 2025-04-01 NOTE — PROGRESS NOTES
Referred by Dr. Kendall ref. provider found provider found for   Chief Complaint   Patient presents with    Hospital Follow-up     Due to having a CVA        Chief complaint:   Chief Complaint   Patient presents with    Hospital Follow-up     Due to having a CVA        History of Present Illness  Alireza Bellamy is a 69 y.o. year old male patient is here for follow-up.  Apparently was admitted to the hospital with possible CVA.  His MRI and CTs were negative but he has symptoms TIA-like episode.  He started on Eliquis for 3 weeks and Plavix.  He is will be scheduled for 7 days monitor to rule out atrial fibrillation as etiology of his symptoms.  He has been doing well since discharge no residual deficit.  Did not have any chest pain status post previous coronary angioplasty with stent plantation.  Discussed with the patient we will continue medication will obtain the monitor and wait for the results.  Based on that further recommendation will be made    Past Medical History  Past Medical History:   Diagnosis Date    Arthritis     Coronary artery disease     Hearing aid worn     bilateral    Heart disease     HL (hearing loss)     Hyperlipidemia     Hypertension     Joint pain     Nephrolithiasis     PONV (postoperative nausea and vomiting)     Vertigo     Wears glasses        Social History  Social History     Tobacco Use    Smoking status: Former     Types: Cigars     Quit date: 2010     Years since quitting: 15.2    Smokeless tobacco: Current     Types: Chew   Vaping Use    Vaping status: Never Used   Substance Use Topics    Alcohol use: Never    Drug use: Never       Family History     Family History   Problem Relation Name Age of Onset    Coronary artery disease Mother      Other (PTCA) Mother      Leukemia Father         Review of Systems  As per HPI, all other systems reviewed and negative.    Allergies:  No Known Allergies     Outpatient Medications:  Current Outpatient Medications   Medication Instructions     apixaban (ELIQUIS) 5 mg, oral, 2 times daily    atorvastatin (LIPITOR) 80 mg, oral, Daily    carvedilol (COREG) 12.5 mg, oral, 2 times daily (morning and late afternoon)    [START ON 4/10/2025] clopidogrel (PLAVIX) 75 mg, oral, Daily    coenzyme Q-10 100 mg, Daily    isosorbide mononitrate ER (IMDUR) 30 mg, oral, Daily, Do not crush or chew.    losartan (COZAAR) 25 mg, oral, Daily    nitroglycerin (NITROSTAT) 0.4 mg, sublingual, Every 5 min PRN    tiZANidine (ZANAFLEX) 2 mg, Every 8 hours PRN         Vitals:  Vitals:    04/01/25 1355   BP: 110/68   Pulse: 60       Physical Exam:  Physical Exam  Constitutional:       Appearance: Normal appearance.   HENT:      Head: Normocephalic and atraumatic.   Eyes:      Extraocular Movements: Extraocular movements intact.      Pupils: Pupils are equal, round, and reactive to light.   Cardiovascular:      Rate and Rhythm: Normal rate and regular rhythm.      Pulses: Normal pulses.      Heart sounds: Normal heart sounds.   Pulmonary:      Effort: Pulmonary effort is normal.      Breath sounds: Normal breath sounds.   Abdominal:      General: Abdomen is flat.      Palpations: Abdomen is soft.   Musculoskeletal:      Right lower leg: No edema.      Left lower leg: No edema.   Skin:     General: Skin is warm and dry.   Neurological:      General: No focal deficit present.      Mental Status: He is alert and oriented to person, place, and time.             Assessment/Plan   Diagnoses and all orders for this visit:  CAD S/P percutaneous coronary angioplasty  Essential hypertension  S/P coronary artery bypass graft x 5  Mixed hyperlipidemia  BMI 34.0-34.9,adult  Acute CVA (cerebrovascular accident) (Multi)  Never smoked tobacco  Moderate mitral regurgitation      IRex RN   am scribing for, and in the presence of Dr. Deborah Oates MD [facility] .    I, Dr. Deborah Oates MD [facility] , personally performed the services described in the documentation as scribed by Rex Crow RN   in my  presence, and confirm it is both accurate and complete.      Deborah Oates MD [facility]  Interventional Cardiology   of [facility]     Thank you for allowing me to participate in the care of this patient. Please do not hesitate to contact me with any further questions or concerns.

## 2025-04-02 ENCOUNTER — TELEPHONE (OUTPATIENT)
Dept: PRIMARY CARE | Facility: CLINIC | Age: 70
End: 2025-04-02
Payer: MEDICARE

## 2025-04-02 ENCOUNTER — HOME CARE VISIT (OUTPATIENT)
Dept: HOME HEALTH SERVICES | Facility: HOME HEALTH | Age: 70
End: 2025-04-02
Payer: MEDICARE

## 2025-04-02 VITALS
OXYGEN SATURATION: 96 % | HEART RATE: 58 BPM | TEMPERATURE: 97 F | DIASTOLIC BLOOD PRESSURE: 82 MMHG | RESPIRATION RATE: 18 BRPM | SYSTOLIC BLOOD PRESSURE: 130 MMHG

## 2025-04-02 DIAGNOSIS — I10 ESSENTIAL HYPERTENSION: Primary | ICD-10-CM

## 2025-04-02 PROCEDURE — G0153 HHCP-SVS OF S/L PATH,EA 15MN: HCPCS | Mod: HHH

## 2025-04-02 PROCEDURE — G0299 HHS/HOSPICE OF RN EA 15 MIN: HCPCS | Mod: HHH

## 2025-04-02 SDOH — ECONOMIC STABILITY: GENERAL

## 2025-04-02 ASSESSMENT — ACTIVITIES OF DAILY LIVING (ADL)
MONEY MANAGEMENT (EXPENSES/BILLS): INDEPENDENT
AMBULATION ASSISTANCE: 1
CURRENT_FUNCTION: INDEPENDENT
PHYSICAL TRANSFERS ASSESSED: 1
AMBULATION ASSISTANCE: INDEPENDENT

## 2025-04-02 ASSESSMENT — ENCOUNTER SYMPTOMS
PAIN LOCATION - EXACERBATING FACTORS: ACTIVITY
HIGHEST PAIN SEVERITY IN PAST 24 HOURS: 6/10
PAIN: 1
PAIN LOCATION - PAIN SEVERITY: 6/10
LOWEST PAIN SEVERITY IN PAST 24 HOURS: 2/10
OCCASIONAL FEELINGS OF UNSTEADINESS: 0
LAST BOWEL MOVEMENT: 67297
BOWEL PATTERN NORMAL: 1
PERSON REPORTING PAIN: PATIENT
DEPRESSION: 0
LOSS OF SENSATION IN FEET: 1
PAIN LOCATION: BACK
PAIN LOCATION - PAIN SEVERITY: 4/10
STOOL FREQUENCY: DAILY
ARTHRALGIAS: 1
PAIN: 1
MUSCLE WEAKNESS: 1
APPETITE LEVEL: GOOD
CHANGE IN APPETITE: UNCHANGED
PAIN LOCATION: BACK
PAIN SEVERITY GOAL: 0/10
PERSON REPORTING PAIN: PATIENT
PAIN LOCATION - PAIN QUALITY: ACHE
PAIN LOCATION - PAIN DURATION: INTERMITTENT
PAIN LOCATION - RELIEVING FACTORS: REST/RX
PAIN LOCATION - PAIN FREQUENCY: INTERMITTENT
HYPERTENSION: 1

## 2025-04-02 ASSESSMENT — PAIN SCALES - PAIN ASSESSMENT IN ADVANCED DEMENTIA (PAINAD)
FACIALEXPRESSION: 0
NEGVOCALIZATION: 0 - NONE.
NEGVOCALIZATION: 0
BODYLANGUAGE: 0
TOTALSCORE: 0
CONSOLABILITY: 0
FACIALEXPRESSION: 0 - SMILING OR INEXPRESSIVE.
BODYLANGUAGE: 0 - RELAXED.
CONSOLABILITY: 0 - NO NEED TO CONSOLE.
BREATHING: 0

## 2025-04-02 NOTE — TELEPHONE ENCOUNTER
7 day zio patch [id number] no auth is required per [facility]/[employer] portal Transaction ID# [id number]

## 2025-04-03 VITALS — HEART RATE: 78 BPM | TEMPERATURE: 98.9 F | OXYGEN SATURATION: 98 %

## 2025-04-03 ASSESSMENT — ENCOUNTER SYMPTOMS
ANGER WITHIN DEFINED LIMITS: 1
AGGRESSION WITHIN DEFINED LIMITS: 1

## 2025-04-11 ENCOUNTER — HOME CARE VISIT (OUTPATIENT)
Dept: HOME HEALTH SERVICES | Facility: HOME HEALTH | Age: 70
End: 2025-04-11
Payer: MEDICARE

## 2025-04-11 VITALS
OXYGEN SATURATION: 99 % | DIASTOLIC BLOOD PRESSURE: 68 MMHG | SYSTOLIC BLOOD PRESSURE: 116 MMHG | RESPIRATION RATE: 18 BRPM | TEMPERATURE: 96.3 F | HEART RATE: 60 BPM

## 2025-04-11 VITALS — HEART RATE: 68 BPM | TEMPERATURE: 98.8 F | OXYGEN SATURATION: 98 %

## 2025-04-11 PROCEDURE — G0299 HHS/HOSPICE OF RN EA 15 MIN: HCPCS | Mod: HHH

## 2025-04-11 PROCEDURE — G0153 HHCP-SVS OF S/L PATH,EA 15MN: HCPCS | Mod: HHH

## 2025-04-11 ASSESSMENT — ENCOUNTER SYMPTOMS
PERSON REPORTING PAIN: PATIENT
LAST BOWEL MOVEMENT: 67306
DENIES PAIN: 1
STOOL FREQUENCY: DAILY
MUSCLE WEAKNESS: 1
OCCASIONAL FEELINGS OF UNSTEADINESS: 0
BOWEL PATTERN NORMAL: 1
LOSS OF SENSATION IN FEET: 0
ARTHRALGIAS: 1
PAIN: 1
APPETITE LEVEL: GOOD
AGGRESSION WITHIN DEFINED LIMITS: 1
HYPERTENSION: 1
DEPRESSION: 0
ANGER WITHIN DEFINED LIMITS: 1
CHANGE IN APPETITE: UNCHANGED
PERSON REPORTING PAIN: PATIENT

## 2025-04-11 ASSESSMENT — PAIN SCALES - PAIN ASSESSMENT IN ADVANCED DEMENTIA (PAINAD)
CONSOLABILITY: 0 - NO NEED TO CONSOLE.
CONSOLABILITY: 0
BREATHING: 0
FACIALEXPRESSION: 0 - SMILING OR INEXPRESSIVE.
TOTALSCORE: 0
NEGVOCALIZATION: 0
NEGVOCALIZATION: 0 - NONE.
FACIALEXPRESSION: 0
BODYLANGUAGE: 0
BODYLANGUAGE: 0 - RELAXED.

## 2025-04-11 ASSESSMENT — ACTIVITIES OF DAILY LIVING (ADL)
CURRENT_FUNCTION: INDEPENDENT
AMBULATION ASSISTANCE: 1
AMBULATION ASSISTANCE: INDEPENDENT
PHYSICAL TRANSFERS ASSESSED: 1

## 2025-04-13 NOTE — PROGRESS NOTES
Subjective   Alireza Bellamy is a 69 y.o.   male[place] [facility] [place]. Arias  [facility]  PMH of HTN, HLD, CAD, and vertigo is here being seen s/p hospitalization for stroke-like symptoms of aphasia and left sided weakness. He was a candidate for TNK at this time per Tele-Neurology, but the patient's symptoms improved and the wife declines. MRI brain revealed multiple areas of diffusion restriction within the cortex subcortical white matter of the left parietal lobe. CTA head/neck showed no LVO, 20% stenosis in right cervical ICA. TTE showed decreased EF at 35%, BS -. He was began on Eliquis? And Plavix?, and statin. Ziopatch ordered on discharge.  Today,  Review of systems are negative unless otherwise specified in HPI.   Objective   Neurological Exam  Physical Exam  I personally reviewed laboratory, radiographic, and medical studies which were pertinent for ***.    Assessment/Plan   #CVA with vascular risk factors of ...    Continue with current regimen.  Discussed bleeding precautions with patient while on anti platelet and/or anticoagulation therapy. Discussed stroke prevention such as: HgbA1c <7, tight blood pressure control <130/<80, LDL <70 with statin therapy (if indicated), CPAP/BiPAP compliance (if indicated) and lifestyle modification (Mediterranean diet, daily exercise, nicotine cessation & limiting alcohol use).   Discussed s/sx of stroke such as: face drooping, arm/leg weakness, speech difficulty; sudden numbness of face/extremity, sudden confusion, sudden trouble seeing, sudden trouble walking, sudden severe headache, dizziness, loss of balance or coordination and to call 911 immediately.?     Trapezius strength is normal.  Left: Trapezius strength is normal.  CN XII: Tongue midline without atrophy or fasciculations.    Motor  Normal muscle bulk throughout. Normal muscle tone. Strength is 5/5 throughout all four extremities.    Sensory  Light touch is normal in upper and lower extremities.     Reflexes  Deep tendon reflexes are 2+ and symmetric in all four extremities.    Coordination  Right: Finger-to-nose normal.Left: Finger-to-nose normal.    Gait  Casual gait: Narrow stance. Reduced stride length.    Physical Exam  HENT:      Right Ear: Decreased hearing noted.      Left Ear: Decreased hearing noted.   Eyes:      Extraocular Movements: EOM normal. No nystagmus.   Neurological:      Motor: Motor strength is normal.     Deep Tendon Reflexes: Reflexes are normal and symmetric.   Psychiatric:         Speech: Speech normal.           Assessment/Plan   #CVA  Patient and wife would like to follow up on a as needed basis.     Continue with current regimen of Plavix and statin daily.   Discussed bleeding precautions with patient while on anti platelet and/or anticoagulation therapy. Discussed stroke prevention such as: HgbA1c <7, tight blood pressure control <130/<80, LDL <70 with statin therapy (if indicated), CPAP/BiPAP compliance (if indicated) and lifestyle modification (Mediterranean diet, daily exercise, nicotine cessation & limiting alcohol use).   Discussed s/sx of stroke such as: face drooping, arm/leg weakness, speech difficulty; sudden numbness of face/extremity, sudden confusion, sudden trouble seeing, sudden trouble walking, sudden severe headache, dizziness, loss of balance or coordination and to call 911 immediately.?      Pt. To make follow up with PCP, discussed getting MARIVEL testing, as he snores at night and this can increase his stroke risk.    Patient is cleared to drive from a Neurological standpoint, but I would like to have him follow up with Dr. Oates with holter monitor results first  to r/o heart arrhythmia.

## 2025-04-14 ENCOUNTER — APPOINTMENT (OUTPATIENT)
Dept: CARDIOLOGY | Facility: CLINIC | Age: 70
End: 2025-04-14
Payer: MEDICARE

## 2025-04-14 DIAGNOSIS — I10 ESSENTIAL HYPERTENSION: ICD-10-CM

## 2025-04-14 DIAGNOSIS — I63.9 ACUTE CVA (CEREBROVASCULAR ACCIDENT) (MULTI): ICD-10-CM

## 2025-04-14 DIAGNOSIS — I67.850 CEREBRAL AUTOSOMAL DOMINANT ARTERIOPATHY WITH SUBCORTICAL INFARCTS AND LEUKOENCEPHALOPATHY: ICD-10-CM

## 2025-04-15 ENCOUNTER — HOME CARE VISIT (OUTPATIENT)
Dept: HOME HEALTH SERVICES | Facility: HOME HEALTH | Age: 70
End: 2025-04-15
Payer: MEDICARE

## 2025-04-15 ENCOUNTER — APPOINTMENT (OUTPATIENT)
Dept: CARDIOLOGY | Facility: CLINIC | Age: 70
End: 2025-04-15
Payer: MEDICARE

## 2025-04-15 PROCEDURE — G0153 HHCP-SVS OF S/L PATH,EA 15MN: HCPCS | Mod: HHH

## 2025-04-16 VITALS — TEMPERATURE: 98.3 F | HEART RATE: 72 BPM | OXYGEN SATURATION: 99 %

## 2025-04-16 ASSESSMENT — ACTIVITIES OF DAILY LIVING (ADL)
AMBULATION ASSISTANCE: 1
HOME_HEALTH_OASIS: 00
CURRENT_FUNCTION: INDEPENDENT
OASIS_M1830: 00
PHYSICAL TRANSFERS ASSESSED: 1
AMBULATION ASSISTANCE: INDEPENDENT

## 2025-04-16 ASSESSMENT — ENCOUNTER SYMPTOMS
DEPRESSION: 0
OCCASIONAL FEELINGS OF UNSTEADINESS: 0
PAIN LOCATION - PAIN SEVERITY: 7/10
AGGRESSION WITHIN DEFINED LIMITS: 1
PERSON REPORTING PAIN: PATIENT
PAIN LOCATION: BACK
PAIN: 1
ANGER WITHIN DEFINED LIMITS: 1
LOSS OF SENSATION IN FEET: 0

## 2025-04-17 ENCOUNTER — APPOINTMENT (OUTPATIENT)
Dept: NEUROLOGY | Facility: CLINIC | Age: 70
End: 2025-04-17
Payer: MEDICARE

## 2025-04-17 VITALS
HEART RATE: 71 BPM | WEIGHT: 220 LBS | HEIGHT: 67 IN | BODY MASS INDEX: 34.53 KG/M2 | DIASTOLIC BLOOD PRESSURE: 78 MMHG | SYSTOLIC BLOOD PRESSURE: 130 MMHG

## 2025-04-17 DIAGNOSIS — E78.2 MIXED HYPERLIPIDEMIA: ICD-10-CM

## 2025-04-17 DIAGNOSIS — I63.9 ACUTE CVA (CEREBROVASCULAR ACCIDENT) (MULTI): ICD-10-CM

## 2025-04-17 PROCEDURE — 3075F SYST BP GE 130 - 139MM HG: CPT

## 2025-04-17 PROCEDURE — 1036F TOBACCO NON-USER: CPT

## 2025-04-17 PROCEDURE — 3008F BODY MASS INDEX DOCD: CPT

## 2025-04-17 PROCEDURE — 3078F DIAST BP <80 MM HG: CPT

## 2025-04-17 PROCEDURE — 1159F MED LIST DOCD IN RCRD: CPT

## 2025-04-17 PROCEDURE — 1111F DSCHRG MED/CURRENT MED MERGE: CPT

## 2025-04-17 PROCEDURE — 99215 OFFICE O/P EST HI 40 MIN: CPT

## 2025-04-17 RX ORDER — CLOPIDOGREL BISULFATE 75 MG/1
75 TABLET ORAL DAILY
Qty: 90 TABLET | Refills: 4 | Status: SHIPPED | OUTPATIENT
Start: 2025-04-17 | End: 2026-07-11

## 2025-04-17 RX ORDER — ATORVASTATIN CALCIUM 80 MG/1
80 TABLET, FILM COATED ORAL DAILY
Qty: 90 TABLET | Refills: 4 | Status: SHIPPED | OUTPATIENT
Start: 2025-04-17 | End: 2026-04-17

## 2025-04-17 ASSESSMENT — PATIENT HEALTH QUESTIONNAIRE - PHQ9
1. LITTLE INTEREST OR PLEASURE IN DOING THINGS: NOT AT ALL
2. FEELING DOWN, DEPRESSED OR HOPELESS: NOT AT ALL
SUM OF ALL RESPONSES TO PHQ9 QUESTIONS 1 & 2: 0

## 2025-04-18 ENCOUNTER — PATIENT OUTREACH (OUTPATIENT)
Dept: CARDIOLOGY | Facility: CLINIC | Age: 70
End: 2025-04-18
Payer: MEDICARE

## 2025-04-18 ENCOUNTER — TELEPHONE (OUTPATIENT)
Dept: FAMILY MEDICINE CLINIC | Age: 70
End: 2025-04-18

## 2025-04-18 NOTE — PROGRESS NOTES
Unable to reach patient for follow up call after recent hospitalization.   Left voicemail with call back number for patient to call if needed to assist with any questions or concerns patient may have.

## 2025-04-28 NOTE — PROGRESS NOTES
walking less than half a block increasing pain in his buttocks and burning into the posterior thighs the same on both sides and inability to walk any further.  Has to stop and rest before continuing.  Has tried conservative measures of NSAIDs, oral steroids, injections through pain management (TFESI).  These have only temporary relief.  Patient does not have radiculopathy.  Past medical history of CAD status post CABG with stents on 81 mg aspirin, kidney disease.  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 Deluca cardiologist.  Request preoperative assessment.  Discussed with patient wife will need to be off his anticoagulation preoperatively will restart postoperatively.    Using above images MRI study x-rays patient is a candidate for surgery.  L3-4-5 severe canal stenosis degenerative spondylolisthesis L4-5.  Discussed option of decompressive surgery bilateral facetectomy foraminotomies with greater than 50% loss of facet joints with risk of instability treated with lumbar fusions.  Patient does not have radiculopathy by history.    The surgical/medical procedure, indications and risks have been discussed. There is a low chance of having any type of risk, but risks are still present including serious risks as pain, bleeding, infection, death, paralysis, sensory loss, bladder bowel and sexual dysfunction, chance of recurrence and so forth. Surgery is not a guarantee of normalcy. We cannot undo any permanent damage already done. We cannot change the course of any of the other medical diseases. I have discussed alternative procedures, risks and benefits. I have answered all questions. There is understanding and agreement to proceed.    L3-4-5 lumbar canal stenosis with neurogenic claudication.  L3-4-5 decompression medial facetectomy foraminotomies.  M48.06  [id number], [id number]  GETA prone Ancef 2 g IV preop  Dr. Deluca cardiology

## 2025-04-29 ENCOUNTER — APPOINTMENT (OUTPATIENT)
Dept: CARDIOLOGY | Facility: CLINIC | Age: 70
End: 2025-04-29
Payer: MEDICARE

## 2025-04-29 VITALS
WEIGHT: 217.1 LBS | DIASTOLIC BLOOD PRESSURE: 68 MMHG | HEART RATE: 64 BPM | SYSTOLIC BLOOD PRESSURE: 118 MMHG | BODY MASS INDEX: 34 KG/M2

## 2025-04-29 DIAGNOSIS — I25.10 CAD S/P PERCUTANEOUS CORONARY ANGIOPLASTY: ICD-10-CM

## 2025-04-29 DIAGNOSIS — Z95.1 S/P CORONARY ARTERY BYPASS GRAFT X 5: ICD-10-CM

## 2025-04-29 DIAGNOSIS — Z86.73 H/O: CVA (CEREBROVASCULAR ACCIDENT): ICD-10-CM

## 2025-04-29 DIAGNOSIS — I47.10 PAROXYSMAL SUPRAVENTRICULAR TACHYCARDIA (CMS-HCC): ICD-10-CM

## 2025-04-29 DIAGNOSIS — E78.2 MIXED HYPERLIPIDEMIA: ICD-10-CM

## 2025-04-29 DIAGNOSIS — Z78.9 NEVER SMOKED TOBACCO: ICD-10-CM

## 2025-04-29 DIAGNOSIS — I10 ESSENTIAL HYPERTENSION: ICD-10-CM

## 2025-04-29 DIAGNOSIS — I34.0 MODERATE MITRAL REGURGITATION: ICD-10-CM

## 2025-04-29 DIAGNOSIS — Z98.61 CAD S/P PERCUTANEOUS CORONARY ANGIOPLASTY: ICD-10-CM

## 2025-04-29 PROBLEM — I63.9 ACUTE CVA (CEREBROVASCULAR ACCIDENT) (MULTI): Status: RESOLVED | Noted: 2025-03-24 | Resolved: 2025-04-29

## 2025-04-29 PROCEDURE — 93248 EXT ECG>7D<15D REV&INTERPJ: CPT | Performed by: INTERNAL MEDICINE

## 2025-04-29 PROCEDURE — 99213 OFFICE O/P EST LOW 20 MIN: CPT | Performed by: INTERNAL MEDICINE

## 2025-04-29 PROCEDURE — 1159F MED LIST DOCD IN RCRD: CPT | Performed by: INTERNAL MEDICINE

## 2025-04-29 PROCEDURE — 3074F SYST BP LT 130 MM HG: CPT | Performed by: INTERNAL MEDICINE

## 2025-04-29 PROCEDURE — 1036F TOBACCO NON-USER: CPT | Performed by: INTERNAL MEDICINE

## 2025-04-29 PROCEDURE — 3078F DIAST BP <80 MM HG: CPT | Performed by: INTERNAL MEDICINE

## 2025-04-29 RX ORDER — NITROGLYCERIN 0.4 MG/1
0.4 TABLET SUBLINGUAL EVERY 5 MIN PRN
Qty: 25 TABLET | Refills: 5 | Status: SHIPPED | OUTPATIENT
Start: 2025-04-29 | End: 2026-04-29

## 2025-04-29 NOTE — PROGRESS NOTES
Referred by Dr. Kendall ref. provider found provider found for   Chief Complaint   Patient presents with    Follow-up     Patient here to discuss Ziopatch results        Chief complaint:   Chief Complaint   Patient presents with    Follow-up     Patient here to discuss Ziopatch results        History of Present Illness  Alireza Bellamy is a 69 y.o. year old male patient is here for follow-up.  Zio patch showed very short runs of supraventricular tachycardia with 1 run of slow ventricular rate.  Doing well from a cardiac standpoint no complaint no symptoms of chest pain or shortness of breath.  Occasional sinus tachycardia.  Discussed with the patient Kneifl continue medication he would be okay to travel and drive.  Will call for any problem follow-up as scheduled    Past Medical History  Medical History[1]    Social History  Social History[2]    Family History   Family History[3]    Review of Systems  As per HPI, all other systems reviewed and negative.    Allergies:  RX Allergies[4]     Outpatient Medications:  Current Outpatient Medications   Medication Instructions    atorvastatin (LIPITOR) 80 mg, oral, Daily    carvedilol (COREG) 12.5 mg, oral, 2 times daily (morning and late afternoon)    clopidogrel (PLAVIX) 75 mg, oral, Daily    coenzyme Q-10 100 mg capsule 1 capsule, Daily    isosorbide mononitrate ER (IMDUR) 30 mg, oral, Daily, Do not crush or chew.    losartan (COZAAR) 25 mg, oral, Daily    nitroglycerin (NITROSTAT) 0.4 mg, sublingual, Every 5 min PRN    tiZANidine (ZANAFLEX) 2 mg, Every 8 hours PRN         Vitals:  Vitals:    04/29/25 1346   BP: 118/68   Pulse: 64       Physical Exam:  Physical Exam  Constitutional:       Appearance: Normal appearance.   HENT:      Head: Normocephalic and atraumatic.   Eyes:      Extraocular Movements: Extraocular movements intact.      Pupils: Pupils are equal, round, and reactive to light.   Cardiovascular:      Rate and Rhythm: Normal rate and regular rhythm.      Pulses:  Normal pulses.      Heart sounds: Normal heart sounds.   Pulmonary:      Effort: Pulmonary effort is normal.      Breath sounds: Normal breath sounds.   Abdominal:      General: Abdomen is flat.      Palpations: Abdomen is soft.   Musculoskeletal:      Right lower leg: No edema.      Left lower leg: No edema.   Skin:     General: Skin is warm and dry.   Neurological:      General: No focal deficit present.      Mental Status: He is alert and oriented to person, place, and time.             Assessment/Plan   Diagnoses and all orders for this visit:  S/P coronary artery bypass graft x 5  CAD S/P percutaneous coronary angioplasty  Essential hypertension  Moderate mitral regurgitation  Mixed hyperlipidemia  BMI 34.0-34.9,adult  H/O: CVA (cerebrovascular accident)  Never smoked tobacco  Paroxysmal supraventricular tachycardia (CMS-HCC)          IRex RN   am scribing for, and in the presence of Dr. Deborah Oates MD FACC .    I, Dr. Deborah Oates MD FACC , personally performed the services described in the documentation as scribed by Rex Crow RN   in my presence, and confirm it is both accurate and complete.      Deborah Oates MD FACC  Interventional Cardiology  Thank you for allowing me to participate in the care of this patient. Please do not hesitate to contact me with any further questions or concerns.         [1]   Past Medical History:  Diagnosis Date    Arthritis     Coronary artery disease     Hearing aid worn     bilateral    Heart disease     HL (hearing loss)     Hyperlipidemia     Hypertension     Joint pain     Nephrolithiasis     PONV (postoperative nausea and vomiting)     Vertigo     Wears glasses    [2]   Social History  Tobacco Use    Smoking status: Former     Types: Cigars     Quit date: 2010     Years since quitting: 15.3    Smokeless tobacco: Former     Types: Chew     Quit date: 2025   Vaping Use    Vaping status: Never Used   Substance Use Topics    Alcohol use: Never    Drug use:  Never   [3]   Family History  Problem Relation Name Age of Onset    Coronary artery disease Mother      Other (PTCA) Mother      Leukemia Father     [4] No Known Allergies

## 2025-04-29 NOTE — PATIENT INSTRUCTIONS
Patient to follow up in 9 months with Dr. Deborah Oates MD [facility]     From cardiac standpoint- okay to drive.     If you need to proceed with spinal surgery in future- please call us to arrange surgical clearance before proceeding.     No other changes today.   Continue same medications and treatments.   Patient educated on proper medication use.   Patient educated on risk factor modification.   Please bring any lab results from other providers / physicians to your next appointment.     Please bring all medicines, vitamins, and herbal supplements with you when you come to the office.     Prescriptions will not be filled unless you are compliant with your follow up appointments or have a follow up appointment scheduled as per instruction of your physician. Refills should be requested at the time of your visit.    IRex RN am scribing for and in the presence of Dr. [facility]

## 2025-05-08 ENCOUNTER — OFFICE VISIT (OUTPATIENT)
Age: 70
End: 2025-05-08
Payer: MEDICARE

## 2025-05-08 VITALS — BODY MASS INDEX: 33.04 KG/M2 | HEIGHT: 68 IN | WEIGHT: 218 LBS

## 2025-05-08 DIAGNOSIS — M43.17 ACQUIRED SPONDYLOLISTHESIS OF LUMBOSACRAL REGION: ICD-10-CM

## 2025-05-08 DIAGNOSIS — M48.062 SPINAL STENOSIS OF LUMBAR REGION WITH NEUROGENIC CLAUDICATION: Primary | ICD-10-CM

## 2025-05-08 DIAGNOSIS — I10 ESSENTIAL HYPERTENSION: ICD-10-CM

## 2025-05-08 DIAGNOSIS — Z79.01 CHRONIC ANTICOAGULATION: ICD-10-CM

## 2025-05-08 DIAGNOSIS — Z95.1 S/P CORONARY ARTERY BYPASS GRAFT X 5: ICD-10-CM

## 2025-05-08 PROCEDURE — 1123F ACP DISCUSS/DSCN MKR DOCD: CPT | Performed by: NEUROLOGICAL SURGERY

## 2025-05-08 PROCEDURE — 99214 OFFICE O/P EST MOD 30 MIN: CPT | Performed by: NEUROLOGICAL SURGERY

## 2025-05-08 PROCEDURE — 1160F RVW MEDS BY RX/DR IN RCRD: CPT | Performed by: NEUROLOGICAL SURGERY

## 2025-05-08 PROCEDURE — 1159F MED LIST DOCD IN RCRD: CPT | Performed by: NEUROLOGICAL SURGERY

## 2025-05-08 PROCEDURE — 1125F AMNT PAIN NOTED PAIN PRSNT: CPT | Performed by: NEUROLOGICAL SURGERY

## 2025-05-08 RX ORDER — KETOROLAC TROMETHAMINE 30 MG/ML
15 INJECTION, SOLUTION INTRAMUSCULAR; INTRAVENOUS ONCE
OUTPATIENT
Start: 2025-05-08 | End: 2025-05-08

## 2025-05-08 RX ORDER — ACETAMINOPHEN 325 MG/1
1000 TABLET ORAL ONCE
OUTPATIENT
Start: 2025-05-08 | End: 2025-05-08

## 2025-05-08 RX ORDER — SODIUM CHLORIDE 0.9 % (FLUSH) 0.9 %
5-40 SYRINGE (ML) INJECTION EVERY 12 HOURS SCHEDULED
OUTPATIENT
Start: 2025-05-08

## 2025-05-08 RX ORDER — CLOPIDOGREL BISULFATE 75 MG/1
75 TABLET ORAL DAILY
COMMUNITY
Start: 2025-04-17 | End: 2026-07-11

## 2025-05-08 RX ORDER — SODIUM CHLORIDE 9 MG/ML
INJECTION, SOLUTION INTRAVENOUS PRN
OUTPATIENT
Start: 2025-05-08

## 2025-05-08 RX ORDER — SODIUM CHLORIDE 0.9 % (FLUSH) 0.9 %
5-40 SYRINGE (ML) INJECTION PRN
OUTPATIENT
Start: 2025-05-08

## 2025-05-08 ASSESSMENT — ENCOUNTER SYMPTOMS
BACK PAIN: 1
CONSTIPATION: 0
SHORTNESS OF BREATH: 0
DIARRHEA: 0
NAUSEA: 0

## 2025-05-08 NOTE — PATIENT INSTRUCTIONS
We're looking forward to seeing you at your upcoming appointment     Now you can save time and skip the clipboard! Pre-Registration lets you complete your appointment paperwork and pay your copay directly from your MyChart. Then, when you arrive for your appointment, simply stop at central check in, located on the first floor, and then proceed to the suite. We are located on the first floor in [address], right next to central check in.     We are committed to providing you with exceptional care and look forward to seeing you at your upcoming appointment. If you have any questions or concerns, please do not hesitate to reach out to us.

## 2025-05-20 ENCOUNTER — APPOINTMENT (OUTPATIENT)
Dept: NEUROLOGY | Facility: CLINIC | Age: 70
End: 2025-05-20
Payer: MEDICARE

## 2025-05-27 ENCOUNTER — APPOINTMENT (OUTPATIENT)
Dept: CARDIOLOGY | Facility: CLINIC | Age: 70
End: 2025-05-27
Payer: MEDICARE

## 2025-05-27 VITALS
DIASTOLIC BLOOD PRESSURE: 76 MMHG | WEIGHT: 216.9 LBS | HEART RATE: 66 BPM | BODY MASS INDEX: 33.97 KG/M2 | SYSTOLIC BLOOD PRESSURE: 132 MMHG

## 2025-05-27 DIAGNOSIS — I10 ESSENTIAL HYPERTENSION: ICD-10-CM

## 2025-05-27 DIAGNOSIS — Z98.61 CAD S/P PERCUTANEOUS CORONARY ANGIOPLASTY: ICD-10-CM

## 2025-05-27 DIAGNOSIS — E78.2 MIXED HYPERLIPIDEMIA: ICD-10-CM

## 2025-05-27 DIAGNOSIS — I25.10 CAD S/P PERCUTANEOUS CORONARY ANGIOPLASTY: ICD-10-CM

## 2025-05-27 DIAGNOSIS — Z95.1 S/P CORONARY ARTERY BYPASS GRAFT X 5: ICD-10-CM

## 2025-05-27 DIAGNOSIS — I34.0 MODERATE MITRAL REGURGITATION: ICD-10-CM

## 2025-05-27 DIAGNOSIS — Z01.810 ENCOUNTER FOR PRE-OPERATIVE CARDIOVASCULAR CLEARANCE: ICD-10-CM

## 2025-05-27 DIAGNOSIS — Z78.9 NEVER SMOKED TOBACCO: ICD-10-CM

## 2025-05-27 PROCEDURE — 93000 ELECTROCARDIOGRAM COMPLETE: CPT | Performed by: INTERNAL MEDICINE

## 2025-05-27 PROCEDURE — 3075F SYST BP GE 130 - 139MM HG: CPT | Performed by: INTERNAL MEDICINE

## 2025-05-27 PROCEDURE — 3078F DIAST BP <80 MM HG: CPT | Performed by: INTERNAL MEDICINE

## 2025-05-27 PROCEDURE — 1036F TOBACCO NON-USER: CPT | Performed by: INTERNAL MEDICINE

## 2025-05-27 PROCEDURE — 99214 OFFICE O/P EST MOD 30 MIN: CPT | Performed by: INTERNAL MEDICINE

## 2025-05-27 PROCEDURE — 1159F MED LIST DOCD IN RCRD: CPT | Performed by: INTERNAL MEDICINE

## 2025-05-27 NOTE — H&P (VIEW-ONLY)
Referred by Dr. Kendall ref. provider found provider found     Chief complaint:   Chief Complaint   Patient presents with    Pre-op Clearance     Patient being seen for cardiac clearance for back surgery with Dr. Herrera.  Date pending.        History of Present Illness  Alireza Bellamy is a 69 y.o. year old male patient is here for preop clearance for back surgery.  The patient has previous history of multivessel coronary angioplasty and stent plantation history of moderate left ventricular dysfunction history of moderate to severe mitral regurgitation.  Did not have any recent symptoms of chest pain palpitations syncope or presyncope.  Discussed with the patient will continue medication we will go ahead with Lexiscan stress test prior to surgery in view of his history of coronary stenting on the multivessel coronary angioplasty and history of LV dysfunction.  Based on that we will determine clearance for surgery    Past Medical History  Medical History[1]    Social History  Social History[2]    Family History   Family History[3]    Review of Systems  As per HPI, all other systems reviewed and negative.    Allergies:  RX Allergies[4]     Outpatient Medications:  Current Outpatient Medications   Medication Instructions    atorvastatin (LIPITOR) 80 mg, oral, Daily    carvedilol (COREG) 12.5 mg, oral, 2 times daily (morning and late afternoon)    clopidogrel (PLAVIX) 75 mg, oral, Daily    coenzyme Q-10 100 mg capsule 1 capsule, Daily    isosorbide mononitrate ER (IMDUR) 30 mg, oral, Daily, Do not crush or chew.    losartan (COZAAR) 25 mg, oral, Daily    nitroglycerin (NITROSTAT) 0.4 mg, sublingual, Every 5 min PRN    tiZANidine (ZANAFLEX) 2 mg, Every 8 hours PRN         Vitals:  Vitals:    05/27/25 1406   BP: 132/76   Pulse: 66       Physical Exam:  Physical Exam  Vitals and nursing note reviewed.   Constitutional:       Appearance: Normal appearance.   HENT:      Head: Normocephalic and atraumatic.   Eyes:      Extraocular  Movements: Extraocular movements intact.      Pupils: Pupils are equal, round, and reactive to light.   Cardiovascular:      Rate and Rhythm: Normal rate and regular rhythm.      Pulses: Normal pulses.   Pulmonary:      Effort: Pulmonary effort is normal.      Breath sounds: Normal breath sounds.   Musculoskeletal:         General: Normal range of motion.      Cervical back: Normal range of motion.      Right lower leg: No edema.      Left lower leg: No edema.   Skin:     General: Skin is warm and dry.   Neurological:      General: No focal deficit present.      Mental Status: He is alert and oriented to person, place, and time.             Assessment/Plan   Diagnoses and all orders for this visit:  Encounter for pre-operative cardiovascular clearance  CAD S/P percutaneous coronary angioplasty  S/P coronary artery bypass graft x 5  Essential hypertension  Mixed hyperlipidemia  Moderate mitral regurgitation  BMI 33.0-33.9,adult  Never smoked tobacco      Olga LANCE LPN am scribing for, and in the presence of Deborah Oates M.D..    Deborah LANCE M.D., personally performed the services described in the documentation as scribed by Olga Rodriguez LPN in my presence, and confirm it is both accurate and complete.    Deborah Oates MD [facility]  Interventional Cardiology   of [facility]     Thank you for allowing me to participate in the care of this patient. Please do not hesitate to contact me with any further questions or concerns.         [1]   Past Medical History:  Diagnosis Date    Arthritis     Coronary artery disease     Hearing aid worn     bilateral    Heart disease     HL (hearing loss)     Hyperlipidemia     Hypertension     Joint pain     Nephrolithiasis     PONV (postoperative nausea and vomiting)     Vertigo     Wears glasses    [2]   Social History  Tobacco Use    Smoking status: Former     Types: Cigars     Quit date: 2010     Years since quitting: 15.4    Smokeless tobacco: Former      Types: Chew     Quit date: 2025   Vaping Use    Vaping status: Never Used   Substance Use Topics    Alcohol use: Never    Drug use: Never   [3]   Family History  Problem Relation Name Age of Onset    Coronary artery disease Mother      Other (PTCA) Mother      Leukemia Father     [4] No Known Allergies

## 2025-05-27 NOTE — PATIENT INSTRUCTIONS
You will be scheduled for a Lexiscan stress test MPL prior to clearing you for surgery.  IF stress test is normal, you will then be cleared for surgery.  If cleared, you can hold your Plavix 1 week prior  Continue same medications/treatment.  Patient educated on proper medication use.  Patient educated on risk factor modification.  Please bring any lab results from other providers / physicians to your next appointment.    Please bring all medicines, vitamins and herbal supplements with you when you come to the office.    Prescriptions will not be filled unless you are compliant with your follow up appointments or have a follow up  appointment scheduled as per instruction of your physician.  Refills should be requested at the time of  Your visit.

## 2025-05-27 NOTE — PROGRESS NOTES
Referred by Dr. Kendall ref. provider found provider found     Chief complaint:   Chief Complaint   Patient presents with    Pre-op Clearance     Patient being seen for cardiac clearance for back surgery with Dr. Herrera.  Date pending.        History of Present Illness  Alireza Bellamy is a 69 y.o. year old male patient is here for preop clearance for back surgery.  The patient has previous history of multivessel coronary angioplasty and stent plantation history of moderate left ventricular dysfunction history of moderate to severe mitral regurgitation.  Did not have any recent symptoms of chest pain palpitations syncope or presyncope.  Discussed with the patient will continue medication we will go ahead with Lexiscan stress test prior to surgery in view of his history of coronary stenting on the multivessel coronary angioplasty and history of LV dysfunction.  Based on that we will determine clearance for surgery    Past Medical History  Medical History[1]    Social History  Social History[2]    Family History   Family History[3]    Review of Systems  As per HPI, all other systems reviewed and negative.    Allergies:  RX Allergies[4]     Outpatient Medications:  Current Outpatient Medications   Medication Instructions    atorvastatin (LIPITOR) 80 mg, oral, Daily    carvedilol (COREG) 12.5 mg, oral, 2 times daily (morning and late afternoon)    clopidogrel (PLAVIX) 75 mg, oral, Daily    coenzyme Q-10 100 mg capsule 1 capsule, Daily    isosorbide mononitrate ER (IMDUR) 30 mg, oral, Daily, Do not crush or chew.    losartan (COZAAR) 25 mg, oral, Daily    nitroglycerin (NITROSTAT) 0.4 mg, sublingual, Every 5 min PRN    tiZANidine (ZANAFLEX) 2 mg, Every 8 hours PRN         Vitals:  Vitals:    05/27/25 1406   BP: 132/76   Pulse: 66       Physical Exam:  Physical Exam  Vitals and nursing note reviewed.   Constitutional:       Appearance: Normal appearance.   HENT:      Head: Normocephalic and atraumatic.   Eyes:      Extraocular  Movements: Extraocular movements intact.      Pupils: Pupils are equal, round, and reactive to light.   Cardiovascular:      Rate and Rhythm: Normal rate and regular rhythm.      Pulses: Normal pulses.   Pulmonary:      Effort: Pulmonary effort is normal.      Breath sounds: Normal breath sounds.   Musculoskeletal:         General: Normal range of motion.      Cervical back: Normal range of motion.      Right lower leg: No edema.      Left lower leg: No edema.   Skin:     General: Skin is warm and dry.   Neurological:      General: No focal deficit present.      Mental Status: He is alert and oriented to person, place, and time.             Assessment/Plan   Diagnoses and all orders for this visit:  Encounter for pre-operative cardiovascular clearance  CAD S/P percutaneous coronary angioplasty  S/P coronary artery bypass graft x 5  Essential hypertension  Mixed hyperlipidemia  Moderate mitral regurgitation  BMI 33.0-33.9,adult  Never smoked tobacco      Olga LANCE LPN am scribing for, and in the presence of Deborah Oates M.D..    Deborah LANCE M.D., personally performed the services described in the documentation as scribed by Olga Rodriguez LPN in my presence, and confirm it is both accurate and complete.    Deborah Oates MD [facility]  Interventional Cardiology   of [facility]     Thank you for allowing me to participate in the care of this patient. Please do not hesitate to contact me with any further questions or concerns.         [1]   Past Medical History:  Diagnosis Date    Arthritis     Coronary artery disease     Hearing aid worn     bilateral    Heart disease     HL (hearing loss)     Hyperlipidemia     Hypertension     Joint pain     Nephrolithiasis     PONV (postoperative nausea and vomiting)     Vertigo     Wears glasses    [2]   Social History  Tobacco Use    Smoking status: Former     Types: Cigars     Quit date: 2010     Years since quitting: 15.4    Smokeless tobacco: Former      Types: Chew     Quit date: 2025   Vaping Use    Vaping status: Never Used   Substance Use Topics    Alcohol use: Never    Drug use: Never   [3]   Family History  Problem Relation Name Age of Onset    Coronary artery disease Mother      Other (PTCA) Mother      Leukemia Father     [4] No Known Allergies

## 2025-05-28 ENCOUNTER — HOSPITAL ENCOUNTER (OUTPATIENT)
Dept: RADIOLOGY | Facility: CLINIC | Age: 70
Discharge: HOME | End: 2025-05-28
Payer: MEDICARE

## 2025-05-28 ENCOUNTER — HOSPITAL ENCOUNTER (OUTPATIENT)
Dept: CARDIOLOGY | Facility: CLINIC | Age: 70
Discharge: HOME | End: 2025-05-28
Payer: MEDICARE

## 2025-05-28 DIAGNOSIS — Z98.61 CAD S/P PERCUTANEOUS CORONARY ANGIOPLASTY: ICD-10-CM

## 2025-05-28 DIAGNOSIS — Z98.61 CAD S/P PERCUTANEOUS CORONARY ANGIOPLASTY: Primary | ICD-10-CM

## 2025-05-28 DIAGNOSIS — I25.10 CAD S/P PERCUTANEOUS CORONARY ANGIOPLASTY: Primary | ICD-10-CM

## 2025-05-28 DIAGNOSIS — I25.10 CAD S/P PERCUTANEOUS CORONARY ANGIOPLASTY: ICD-10-CM

## 2025-05-28 DIAGNOSIS — Z01.810 ENCOUNTER FOR PRE-OPERATIVE CARDIOVASCULAR CLEARANCE: ICD-10-CM

## 2025-05-28 PROCEDURE — 93016 CV STRESS TEST SUPVJ ONLY: CPT | Performed by: INTERNAL MEDICINE

## 2025-05-28 PROCEDURE — 93017 CV STRESS TEST TRACING ONLY: CPT

## 2025-05-28 PROCEDURE — A9502 TC99M TETROFOSMIN: HCPCS | Performed by: INTERNAL MEDICINE

## 2025-05-28 PROCEDURE — 3430000001 HC RX 343 DIAGNOSTIC RADIOPHARMACEUTICALS: Performed by: INTERNAL MEDICINE

## 2025-05-28 PROCEDURE — 78452 HT MUSCLE IMAGE SPECT MULT: CPT

## 2025-05-28 PROCEDURE — 93018 CV STRESS TEST I&R ONLY: CPT | Performed by: INTERNAL MEDICINE

## 2025-05-28 PROCEDURE — 78452 HT MUSCLE IMAGE SPECT MULT: CPT | Performed by: INTERNAL MEDICINE

## 2025-05-28 PROCEDURE — 2500000004 HC RX 250 GENERAL PHARMACY W/ HCPCS (ALT 636 FOR OP/ED): Performed by: INTERNAL MEDICINE

## 2025-05-28 RX ORDER — REGADENOSON 0.08 MG/ML
0.4 INJECTION, SOLUTION INTRAVENOUS ONCE
Status: COMPLETED | OUTPATIENT
Start: 2025-05-28 | End: 2025-05-28

## 2025-05-28 RX ADMIN — REGADENOSON 0.4 MG: 0.08 INJECTION, SOLUTION INTRAVENOUS at 08:49

## 2025-05-28 RX ADMIN — TETROFOSMIN 35.8 MILLICURIE: 0.23 INJECTION, POWDER, LYOPHILIZED, FOR SOLUTION INTRAVENOUS at 08:50

## 2025-05-28 RX ADMIN — TETROFOSMIN 11.3 MILLICURIE: 0.23 INJECTION, POWDER, LYOPHILIZED, FOR SOLUTION INTRAVENOUS at 07:28

## 2025-05-30 ENCOUNTER — TELEPHONE (OUTPATIENT)
Dept: CARDIOLOGY | Facility: CLINIC | Age: 70
End: 2025-05-30
Payer: MEDICARE

## 2025-05-30 NOTE — TELEPHONE ENCOUNTER
----- Message from Deborah Oates sent at 5/29/2025 12:35 PM EDT -----  Abnormal stress test needs a cardiac cath  ----- Message -----  From: Interface, Radiology Results In  Sent: 5/28/2025   4:03 PM EDT  To: Deborah Oates MD

## 2025-06-04 NOTE — TELEPHONE ENCOUNTER
Per Dr. Deborah Oates MD [facility]- patient nuclear is abnormal. Patient to be scheduled for LHC with Dr. Deborah Oates MD [facility] in near future.     Patient is pending L3-4-5 decompression with Dr. Herrera at [facility] in near future.   Nuclear imaging was completed for risk stratification.   Patient is NOT cleared for procedure until LHC is completed and Dr. Deborah Oates MD [facility] advises of clearance.     Will fax this note to Dr. Herrera office as JORDAN. Done at this time.     In terms of LHC:  Routine preop lab work  Patient to hydrate with NS 100cc/hour x2 hours preop  All morning medications okay to take with sip of water, including Plavix day of procedure.     LM for patient to return call to receive message and instructions.   Will place orders once patient is agreeable and received message.   -----------------------------------        Pre-Procedure Patient Information    You have been scheduled for:   At:   With:   Date of procedure:     1. Please have transportation to and from the hospital. While you should plan for same-day discharge there is a possibility you will need to stay overnight.    2. You will receive a call from the hospital 24 - 72 hours before your procedure providing you with fasting instructions, procedure location detail, and time of arrival.  If you have not received a call from the hospital by 6 pm the day before your scheduled procedure, please call [phone].    3. Please bring a current list of medications with you to the hospital.      4. Medications to hold:     - If you are on aspirin, Plavix (Clopidogrel), Effient (Prasugrel), or Brilinta (Ticagrelor), please continue thru day of procedure.       - Otherwise, you may continue your medications in the morning with sips of water.     5. Nothing to eat after midnight before the procedure. OK to take morning medications, with above exceptions, the day of the procedure with a small sip of water.     6. Please bring to our attention if you have any  contrast, latex or metal allergies.    7. Please have your blood work as instructed completed at least a day before your procedure.    8. If you have any questions, please contact the office at [phone].

## 2025-06-05 NOTE — TELEPHONE ENCOUNTER
Poornima from Dr. High's office called and LM asking for an update on clearance. Call back number [phone]. Returned call to Poornima who states she has faxed a form to the correct fax multiple times (number verified). Provided update to Poornima that patient is not cleared and will need C. She verbalized understanding and will document this on their end as well. She stated she does not need forms completed until patient is cleared. She did not receive note stating patient is not cleared from yesterday either.    At this time patient has not returned call to receive results and instructions.

## 2025-06-06 NOTE — TELEPHONE ENCOUNTER
LM again for patient today to return call to receive abnormal nuclear results and recommendation for heart cath.   Please refer to my note below.     In terms of clearance for back surgery- I received clearance form.   Will hold until after [facility] completed.

## 2025-06-09 ENCOUNTER — PREP FOR PROCEDURE (OUTPATIENT)
Dept: CARDIOLOGY | Facility: CLINIC | Age: 70
End: 2025-06-09
Payer: MEDICARE

## 2025-06-09 DIAGNOSIS — R94.39 ABNORMAL NUCLEAR STRESS TEST: ICD-10-CM

## 2025-06-09 DIAGNOSIS — Z98.61 CAD S/P PERCUTANEOUS CORONARY ANGIOPLASTY: ICD-10-CM

## 2025-06-09 DIAGNOSIS — Z95.1 S/P CORONARY ARTERY BYPASS GRAFT X 5: ICD-10-CM

## 2025-06-09 DIAGNOSIS — I25.10 CAD S/P PERCUTANEOUS CORONARY ANGIOPLASTY: ICD-10-CM

## 2025-06-09 DIAGNOSIS — Z01.818 PRE-OPERATIVE CLEARANCE: ICD-10-CM

## 2025-06-09 RX ORDER — SODIUM CHLORIDE 9 MG/ML
100 INJECTION, SOLUTION INTRAVENOUS CONTINUOUS
Status: CANCELLED | OUTPATIENT
Start: 2025-06-09 | End: 2025-06-10

## 2025-06-18 ENCOUNTER — APPOINTMENT (OUTPATIENT)
Dept: CARDIOLOGY | Facility: HOSPITAL | Age: 70
End: 2025-06-18
Payer: MEDICARE

## 2025-06-18 ENCOUNTER — HOSPITAL ENCOUNTER (OUTPATIENT)
Facility: HOSPITAL | Age: 70
Setting detail: OUTPATIENT SURGERY
Discharge: HOME | End: 2025-06-18
Attending: INTERNAL MEDICINE | Admitting: INTERNAL MEDICINE
Payer: MEDICARE

## 2025-06-18 VITALS
RESPIRATION RATE: 11 BRPM | WEIGHT: 212.96 LBS | DIASTOLIC BLOOD PRESSURE: 83 MMHG | TEMPERATURE: 97.9 F | SYSTOLIC BLOOD PRESSURE: 141 MMHG | HEART RATE: 56 BPM | OXYGEN SATURATION: 99 % | BODY MASS INDEX: 33.43 KG/M2 | HEIGHT: 67 IN

## 2025-06-18 DIAGNOSIS — R93.1 ABNORMAL FINDINGS ON DIAGNOSTIC IMAGING OF HEART AND CORONARY CIRCULATION: ICD-10-CM

## 2025-06-18 DIAGNOSIS — Z01.818 PRE-OPERATIVE CLEARANCE: ICD-10-CM

## 2025-06-18 DIAGNOSIS — Z98.61 CAD S/P PERCUTANEOUS CORONARY ANGIOPLASTY: ICD-10-CM

## 2025-06-18 DIAGNOSIS — Z95.1 S/P CORONARY ARTERY BYPASS GRAFT X 5: ICD-10-CM

## 2025-06-18 DIAGNOSIS — I25.10 CAD S/P PERCUTANEOUS CORONARY ANGIOPLASTY: ICD-10-CM

## 2025-06-18 DIAGNOSIS — R94.39 ABNORMAL NUCLEAR STRESS TEST: Primary | ICD-10-CM

## 2025-06-18 LAB
ANION GAP SERPL CALC-SCNC: 12 MMOL/L (ref 10–20)
ATRIAL RATE: 57 BPM
BUN SERPL-MCNC: 25 MG/DL (ref 6–23)
CALCIUM SERPL-MCNC: 9.3 MG/DL (ref 8.6–10.3)
CHLORIDE SERPL-SCNC: 107 MMOL/L (ref 98–107)
CO2 SERPL-SCNC: 23 MMOL/L (ref 21–32)
CREAT SERPL-MCNC: 1.74 MG/DL (ref 0.5–1.3)
EGFRCR SERPLBLD CKD-EPI 2021: 41 ML/MIN/1.73M*2
ERYTHROCYTE [DISTWIDTH] IN BLOOD BY AUTOMATED COUNT: 13.2 % (ref 11.5–14.5)
GLUCOSE SERPL-MCNC: 102 MG/DL (ref 74–99)
HCT VFR BLD AUTO: 39 % (ref 41–52)
HGB BLD-MCNC: 13 G/DL (ref 13.5–17.5)
MCH RBC QN AUTO: 30.7 PG (ref 26–34)
MCHC RBC AUTO-ENTMCNC: 33.3 G/DL (ref 32–36)
MCV RBC AUTO: 92 FL (ref 80–100)
NRBC BLD-RTO: 0 /100 WBCS (ref 0–0)
P AXIS: 11 DEGREES
P OFFSET: 162 MS
P ONSET: 109 MS
PLATELET # BLD AUTO: 251 X10*3/UL (ref 150–450)
POTASSIUM SERPL-SCNC: 4 MMOL/L (ref 3.5–5.3)
PR INTERVAL: 200 MS
Q ONSET: 209 MS
QRS COUNT: 9 BEATS
QRS DURATION: 104 MS
QT INTERVAL: 408 MS
QTC CALCULATION(BAZETT): 397 MS
QTC FREDERICIA: 401 MS
R AXIS: -35 DEGREES
RBC # BLD AUTO: 4.23 X10*6/UL (ref 4.5–5.9)
SODIUM SERPL-SCNC: 138 MMOL/L (ref 136–145)
T AXIS: 86 DEGREES
T OFFSET: 413 MS
VENTRICULAR RATE: 57 BPM
WBC # BLD AUTO: 10.2 X10*3/UL (ref 4.4–11.3)

## 2025-06-18 PROCEDURE — 99152 MOD SED SAME PHYS/QHP 5/>YRS: CPT | Performed by: INTERNAL MEDICINE

## 2025-06-18 PROCEDURE — 80048 BASIC METABOLIC PNL TOTAL CA: CPT | Performed by: NURSE PRACTITIONER

## 2025-06-18 PROCEDURE — 36415 COLL VENOUS BLD VENIPUNCTURE: CPT | Performed by: NURSE PRACTITIONER

## 2025-06-18 PROCEDURE — 93455 CORONARY ART/GRFT ANGIO S&I: CPT | Performed by: INTERNAL MEDICINE

## 2025-06-18 PROCEDURE — 93010 ELECTROCARDIOGRAM REPORT: CPT | Performed by: INTERNAL MEDICINE

## 2025-06-18 PROCEDURE — 93005 ELECTROCARDIOGRAM TRACING: CPT

## 2025-06-18 PROCEDURE — 2550000001 HC RX 255 CONTRASTS: Performed by: INTERNAL MEDICINE

## 2025-06-18 PROCEDURE — 2500000001 HC RX 250 WO HCPCS SELF ADMINISTERED DRUGS (ALT 637 FOR MEDICARE OP): Performed by: NURSE PRACTITIONER

## 2025-06-18 PROCEDURE — 7100000010 HC PHASE TWO TIME - EACH INCREMENTAL 1 MINUTE: Performed by: INTERNAL MEDICINE

## 2025-06-18 PROCEDURE — 2500000004 HC RX 250 GENERAL PHARMACY W/ HCPCS (ALT 636 FOR OP/ED): Performed by: NURSE PRACTITIONER

## 2025-06-18 PROCEDURE — 99153 MOD SED SAME PHYS/QHP EA: CPT | Performed by: INTERNAL MEDICINE

## 2025-06-18 PROCEDURE — 7100000009 HC PHASE TWO TIME - INITIAL BASE CHARGE: Performed by: INTERNAL MEDICINE

## 2025-06-18 PROCEDURE — 7100000002 HC RECOVERY ROOM TIME - EACH INCREMENTAL 1 MINUTE: Performed by: INTERNAL MEDICINE

## 2025-06-18 PROCEDURE — C1894 INTRO/SHEATH, NON-LASER: HCPCS | Performed by: INTERNAL MEDICINE

## 2025-06-18 PROCEDURE — 2500000004 HC RX 250 GENERAL PHARMACY W/ HCPCS (ALT 636 FOR OP/ED): Performed by: INTERNAL MEDICINE

## 2025-06-18 PROCEDURE — 99024 POSTOP FOLLOW-UP VISIT: CPT | Performed by: NURSE PRACTITIONER

## 2025-06-18 PROCEDURE — 85027 COMPLETE CBC AUTOMATED: CPT | Performed by: NURSE PRACTITIONER

## 2025-06-18 PROCEDURE — 2720000007 HC OR 272 NO HCPCS: Performed by: INTERNAL MEDICINE

## 2025-06-18 PROCEDURE — 7100000001 HC RECOVERY ROOM TIME - INITIAL BASE CHARGE: Performed by: INTERNAL MEDICINE

## 2025-06-18 RX ORDER — ASPIRIN 325 MG
325 TABLET ORAL ONCE
Status: COMPLETED | OUTPATIENT
Start: 2025-06-18 | End: 2025-06-18

## 2025-06-18 RX ORDER — MIDAZOLAM HYDROCHLORIDE 1 MG/ML
INJECTION INTRAMUSCULAR; INTRAVENOUS AS NEEDED
Status: DISCONTINUED | OUTPATIENT
Start: 2025-06-18 | End: 2025-06-18 | Stop reason: HOSPADM

## 2025-06-18 RX ORDER — FENTANYL CITRATE 50 UG/ML
INJECTION, SOLUTION INTRAMUSCULAR; INTRAVENOUS AS NEEDED
Status: DISCONTINUED | OUTPATIENT
Start: 2025-06-18 | End: 2025-06-18 | Stop reason: HOSPADM

## 2025-06-18 RX ORDER — SODIUM CHLORIDE 9 MG/ML
75 INJECTION, SOLUTION INTRAVENOUS CONTINUOUS
Status: DISCONTINUED | OUTPATIENT
Start: 2025-06-18 | End: 2025-06-18 | Stop reason: HOSPADM

## 2025-06-18 RX ORDER — LIDOCAINE HYDROCHLORIDE 20 MG/ML
INJECTION, SOLUTION INFILTRATION; PERINEURAL AS NEEDED
Status: DISCONTINUED | OUTPATIENT
Start: 2025-06-18 | End: 2025-06-18 | Stop reason: HOSPADM

## 2025-06-18 RX ADMIN — ASPIRIN 325 MG: 325 TABLET ORAL at 11:13

## 2025-06-18 RX ADMIN — SODIUM CHLORIDE 75 ML/HR: 0.9 INJECTION, SOLUTION INTRAVENOUS at 11:08

## 2025-06-18 ASSESSMENT — PAIN SCALES - GENERAL
PAINLEVEL_OUTOF10: 0 - NO PAIN

## 2025-06-18 ASSESSMENT — COLUMBIA-SUICIDE SEVERITY RATING SCALE - C-SSRS
1. IN THE PAST MONTH, HAVE YOU WISHED YOU WERE DEAD OR WISHED YOU COULD GO TO SLEEP AND NOT WAKE UP?: NO
6. HAVE YOU EVER DONE ANYTHING, STARTED TO DO ANYTHING, OR PREPARED TO DO ANYTHING TO END YOUR LIFE?: NO
2. HAVE YOU ACTUALLY HAD ANY THOUGHTS OF KILLING YOURSELF?: NO

## 2025-06-18 ASSESSMENT — PAIN - FUNCTIONAL ASSESSMENT: PAIN_FUNCTIONAL_ASSESSMENT: 0-10

## 2025-06-18 NOTE — Clinical Note
Patient ID band present and verified. Patient contact is in the lobby. Contact(s) present: Ex. Wife. Contact name: Kailee. Contact # : [phone].

## 2025-06-18 NOTE — Clinical Note
Single view of the saphenous vein graft to the right coronary artery obtained using power injection.

## 2025-06-18 NOTE — DISCHARGE INSTRUCTIONS
CARDIAC CATHETERIZATION DISCHARGE INSTRUCTIONS     FOR SUDDEN AND SEVERE CHEST PAIN, SHORTNESS OF BREATH, EXCESSIVE BLEEDING, SIGNS OF STROKE, OR CHANGES IN MENTAL STATUS YOU SHOULD CALL 911 IMMEDIATELY.     If your provider has prescribed aspirin and/or clopidogrel (Plavix)---DO NOT STOP THESE MEDICATIONS for any reason without talking to your cardiologist first. If any of these were prescribed, you must take them every day without missing a single dose. If you are getting low on these medications, contact your provider immediately for a refill.     FOR NEXT 24 HOURS  - Upon discharge, you should return home and rest for the remainder of the day and evening. You do not have to stay on bed rest but should not be very active.  It is recommended a responsible adult be with you for the first 24 hours after the procedure.    - No driving for 24 hours after procedure. Please arrange for someone to drive you home from the hospital today.     - Do not drive, operate machinery, or use power tools for 24 hours after your procedure.     - Do not make any legal decisions for 24 hours after your procedure.     - Do not drink alcoholic beverages for 24 hours after your procedure.    WOUND CARE   *FOR FEMORAL (LEG) ACCESS*  ·      Avoid heavy lifting (over 10 pounds) for 3 days, squatting or excessive bending for 2 days, and strenuous exercise for 7 days.  ·      No submerged bathing, swimming, or hot tubs for the next 7 days, or until fully healed.  ·      Avoid sexual activity for 3-4 days until any groin discomfort has ceased.    - The transparent dressing should be removed from the site 24 hours after the procedure.  Wash the site gently with soap and water. Rinse well and pat dry. Keep the area clean and dry. You may apply a Band-Aid to the site. Avoid lotions, ointments, or powders until fully healed.     - You may shower the day after your procedure.      - It is normal to notice a small bruise around the puncture  site and/or a small grape sized or smaller lump. Any large bruising or large lump warrants a call to the office.     - If bleeding should occur, lay down and apply pressure to the affected area for 10 minutes.  If the bleeding stops notify your physician.  If there is a large amount of bleeding or spurting of blood CALL 911 immediately.  DO NOT drive yourself to the hospital.    - You may experience some tenderness, bruising or minimal inflammation.  If you have any concerns, you may contact the Cath Lab or if any of these symptoms become excessive, contact your cardiologist or go to the emergency room.     OTHER INSTRUCTIONS  - You may take acetaminophen (Tylenol) as directed for discomfort.  If pain is not relieved with acetaminophen (Tylenol), contact your doctor.    - If you notice or experience any of the following, you should notify your doctor or seek medical attention  Chest pain or discomfort  Change in mental status or weakness in extremities.  Dizziness, light headedness, or feeling faint.  Change in the site where the procedure was performed, such as bleeding or an increased area of bruising or swelling.  Tingling, numbness, pain, or coolness in the leg/arm beyond the site where the procedure was performed.  Signs of infection (i.e. shaking chills, temperature > 100 degrees Fahrenheit, warmth, redness) in the leg/arm area where the procedure was performed.  Changes in urination   Bloody or black stools  Vomiting blood  Severe nose bleeds  Any excessive bleeding    - If you DO NOT have an appointment with your cardiologist within 2-4 weeks following your procedure, please contact their office.

## 2025-06-18 NOTE — NURSING NOTE
Patient arrived from cath lab, s/p [facility]. Right groin site closed with manual pressure, site dressing CDI and soft with palpable pedal pulses. Patient is A&Ox4 and has no c/o at this time. Patient to remain flat for 1 hour post procedure. VSS. Family at bedside, will continue to monitor.

## 2025-06-18 NOTE — Clinical Note
Sheath was removed in the right femoral artery. Site closed by manual compression. Held by Keila PANCHAL

## 2025-06-18 NOTE — Clinical Note
Catheter exchanged with SHEATH, PRASHANT, W/.035 GUIDEWIRE, 10 CM,  4FR INTRODUCER, 4FR SARKIS, 2.5 CM DIALATOR.

## 2025-06-18 NOTE — NURSING NOTE
Discharge instructions reviewed with patient and family. Discussed in depth post procedure restrictions, medications and follow up appointments. Questions and concerns addressed. Right groin site remains stable and unchanged. Plan to dc home.

## 2025-06-18 NOTE — POST-PROCEDURE NOTE
Physician Transition of Care Summary  Invasive Cardiovascular Lab    Procedure Date: 6/18/2025  Attending:    * Deborah Oates - Primary  Resident/Fellow/Other Assistant: Surgeons and Role:  * No surgeons found with a matching role *    Indications:   Pre-op Diagnosis      * Abnormal nuclear stress test [R94.39]     * Pre-operative clearance [Z01.818]     * S/P coronary artery bypass graft x 5 [Z95.1]     * CAD S/P percutaneous coronary angioplasty [I25.10, Z98.61]    Post-procedure diagnosis:   Post-op Diagnosis     * Abnormal nuclear stress test [R94.39]     * Pre-operative clearance [Z01.818]     * S/P coronary artery bypass graft x 5 [Z95.1]     * CAD S/P percutaneous coronary angioplasty [I25.10, Z98.61]    Procedure(s):   [facility]  [id number] - MO CATH PLMT & NJX CORONARY ART/GRFT ANGIO IMG S&I        Procedure Findings:   Patent left internal mammary artery to LAD severe disease of distal LAD at the apical level, patent vein graft to obtuse marginal branch patent previous stent, patent vein graft to right coronary artery, occluded vein graft to second obtuse marginal branch, patent previous left main stent    Description of the Procedure:   Coronary angiography with grafts    Complications:   None    Stents/Implants:   Implants       No implant documentation for this case.            Anticoagulation/Antiplatelet Plan:   None    Estimated Blood Loss:   * No values recorded between 6/18/2025  1:23 PM and 6/18/2025  3:08 PM *    Anesthesia: Moderate Sedation Anesthesia Staff: No anesthesia staff entered.    Any Specimen(s) Removed:   Order Name Source Comment Collection Info Order Time   BASIC METABOLIC PANEL Blood, Venous  Collected By: Brianna Granda RN 6/18/2025 10:43 AM     Release result to [facility]   Immediate        CBC Blood, Venous  Collected By: Brianna Granda RN 6/18/2025 10:43 AM     Release result to [facility]   Immediate            Disposition:   Medical therapy      Electronically  signed by: Deborah Oates MD, 6/18/2025 3:08 PM

## 2025-06-18 NOTE — PROGRESS NOTES
Patient stable status post C under the care of Dr. Oates.  Discussed results of procedure with patient and his wife.  Findings of the LHC revealed patent LIMA to LAD with severe disease in the distal LAD, patent SVG to first OM with patent stent, occluded SVG to OM 2, patent SVG to RCA.  Medical management is advised.  Please see procedural report for complete details.  Patient is cleared from a cardiac standpoint for his upcoming back surgery.  He has routine outpatient follow-up with Dr. Oates scheduled in 6 months or sooner as needed.  Postprocedural activity, restrictions, potential complications, medications and future follow-up discussed at length.  All questions answered.  Both verbalized understanding.

## 2025-06-23 ENCOUNTER — PREP FOR PROCEDURE (OUTPATIENT)
Age: 70
End: 2025-06-23

## 2025-07-07 ENCOUNTER — HOSPITAL ENCOUNTER (OUTPATIENT)
Dept: PREADMISSION TESTING | Age: 70
Discharge: HOME | End: 2025-07-11
Payer: MEDICARE

## 2025-07-07 VITALS
RESPIRATION RATE: 17 BRPM | DIASTOLIC BLOOD PRESSURE: 70 MMHG | HEART RATE: 59 BPM | BODY MASS INDEX: 31.96 KG/M2 | OXYGEN SATURATION: 93 % | SYSTOLIC BLOOD PRESSURE: 124 MMHG | TEMPERATURE: 97.9 F | HEIGHT: 69 IN | WEIGHT: 215.8 LBS

## 2025-07-07 DIAGNOSIS — M48.062 SPINAL STENOSIS OF LUMBAR REGION WITH NEUROGENIC CLAUDICATION: Primary | ICD-10-CM

## 2025-07-07 PROBLEM — R47.01 EXPRESSIVE APHASIA: Status: ACTIVE | Noted: 2025-03-25

## 2025-07-07 PROBLEM — Z96.611 H/O TOTAL SHOULDER REPLACEMENT, RIGHT: Status: ACTIVE | Noted: 2024-06-13

## 2025-07-07 PROBLEM — Z86.73 H/O: CVA (CEREBROVASCULAR ACCIDENT): Status: ACTIVE | Noted: 2025-04-29

## 2025-07-07 PROBLEM — I34.0 MODERATE MITRAL REGURGITATION: Status: ACTIVE | Noted: 2025-04-01

## 2025-07-07 PROBLEM — D64.9 ANEMIA: Status: ACTIVE | Noted: 2024-06-13

## 2025-07-07 PROBLEM — I21.9 MYOCARDIAL INFARCTION (HCC): Status: RESOLVED | Noted: 2025-07-07 | Resolved: 2025-07-07

## 2025-07-07 LAB
ANION GAP SERPL CALCULATED.3IONS-SCNC: 10 MEQ/L (ref 9–15)
APTT PPP: 24.3 SEC (ref 24.4–36.8)
BUN SERPL-MCNC: 22 MG/DL (ref 8–23)
CALCIUM SERPL-MCNC: 9.2 MG/DL (ref 8.5–9.9)
CHLORIDE SERPL-SCNC: 108 MEQ/L (ref 95–107)
CO2 SERPL-SCNC: 26 MEQ/L (ref 20–31)
CREAT SERPL-MCNC: 1.72 MG/DL (ref 0.7–1.2)
ERYTHROCYTE [DISTWIDTH] IN BLOOD BY AUTOMATED COUNT: 13.5 % (ref 11.5–14.5)
GLUCOSE SERPL-MCNC: 129 MG/DL (ref 70–99)
HCT VFR BLD AUTO: 40.2 % (ref 42–52)
HGB BLD-MCNC: 13.2 G/DL (ref 14–18)
INR PPP: 1.1
MCH RBC QN AUTO: 30.8 PG (ref 27–31.3)
MCHC RBC AUTO-ENTMCNC: 32.8 % (ref 33–37)
MCV RBC AUTO: 93.9 FL (ref 79–92.2)
PLATELET # BLD AUTO: 231 K/UL (ref 130–400)
POTASSIUM SERPL-SCNC: 4.5 MEQ/L (ref 3.4–4.9)
PROTHROMBIN TIME: 14.7 SEC (ref 12.3–14.9)
RBC # BLD AUTO: 4.28 M/UL (ref 4.7–6.1)
SODIUM SERPL-SCNC: 144 MEQ/L (ref 135–144)
WBC # BLD AUTO: 10.8 K/UL (ref 4.8–10.8)

## 2025-07-07 PROCEDURE — 80048 BASIC METABOLIC PNL TOTAL CA: CPT

## 2025-07-07 PROCEDURE — 87641 MR-STAPH DNA AMP PROBE: CPT

## 2025-07-07 PROCEDURE — 86901 BLOOD TYPING SEROLOGIC RH(D): CPT

## 2025-07-07 PROCEDURE — 85610 PROTHROMBIN TIME: CPT

## 2025-07-07 PROCEDURE — 85027 COMPLETE CBC AUTOMATED: CPT

## 2025-07-07 PROCEDURE — 85730 THROMBOPLASTIN TIME PARTIAL: CPT

## 2025-07-07 PROCEDURE — 86900 BLOOD TYPING SEROLOGIC ABO: CPT

## 2025-07-07 PROCEDURE — 86850 RBC ANTIBODY SCREEN: CPT

## 2025-07-07 PROCEDURE — 86870 RBC ANTIBODY IDENTIFICATION: CPT

## 2025-07-07 PROCEDURE — 86905 BLOOD TYPING RBC ANTIGENS: CPT

## 2025-07-07 ASSESSMENT — ENCOUNTER SYMPTOMS
PHOTOPHOBIA: 0
DIARRHEA: 0
COUGH: 0
EYE DISCHARGE: 0
RHINORRHEA: 0
SINUS PAIN: 0
EYE REDNESS: 0
SORE THROAT: 0
APNEA: 0
ABDOMINAL DISTENTION: 0
TROUBLE SWALLOWING: 0
CONSTIPATION: 1
FACIAL SWELLING: 0
CHOKING: 0
BACK PAIN: 1
EYE ITCHING: 0
CHEST TIGHTNESS: 0
ABDOMINAL PAIN: 0
NAUSEA: 0
SINUS PRESSURE: 0
VOMITING: 0
EYE PAIN: 0
WHEEZING: 0
SHORTNESS OF BREATH: 0

## 2025-07-07 NOTE — H&P (VIEW-ONLY)
PRE ADMISSION TESTING    HISTORY AND PHYSICAL EXAM    PATIENT NAME:  Bayron Maya    YOB: 1955  MRN:  [id number]  SERVICE DATE:  7/7/2025     Primary Care Physician: Kingston Love MD   Surgeon: Dr. Anais Juan     SUBJECTIVE  CHIEF COMPLAINT:  Spinal stenosis of lumbar region with neurogenic claudication    The patient is a 70 y.o. male with significant past medical history of MI s/p CABG x5, h/o CVA, n/o total R shoulder, HLD, HTN, Anemia, Chronic Anticoagulation who presents for a preoperative consultation at the request of surgeon, Dr. Anais Juan who plans on performing Lumbar 3-4-5 decompression medial facetectomy foraminotomies on 7/11/25 at [facility].     Patient reports he originally had sciatic pain on the right side so he saw his PCP in February 2025 who prescribed oral steroids.  He only had relief for approximately 1 day so he tried another medication but that did not work either.  He was then referred to pain management.  Pain management ordered x-rays, EMG and MRI.  They also provided a steroid injection.  The patient reported that the injection provided minimal relief.  He then tried TFESI and was referred to Dr. Juan.  He was assessed by TAWNYA Panda and Dr. Juan.  He was educated regarding his lumbar stenosis and symptoms.  The patient needed to be cleared by cardiology first before he could have surgery and that took a month or more due to needing a cardiac cath prior to clearance.  He is now cleared and ready to proceed.     Patient reports his pain is 6/10 on the pain scale and is described as a strong cramp in his hamstring region.  Recently, he has noticed numbness and tingling in his bilateral feet. Denies loss of bowel or bladder function.  No oral medications or topical analgesics have relieved the pain.  Walking long distances will increase his pain.       Known Anesthesia Problems: Past general anesthesia with complications- Nausea  Patient Objection to Receiving  MD Clyde   gabapentin (NEURONTIN) 300 MG capsule Take 1 capsule by mouth 3 times daily for 90 days. Intended supply: 90 days  Patient not taking: Reported on 7/7/2025 2/5/25 5/6/25  Kingston Love MD   hydrocortisone 1 % cream Apply topically 2 times daily. 3/23/24   LunamohsenSamantha, APRN - NP   aspirin 81 MG tablet Take 1 tablet by mouth daily  Patient not taking: Reported on 7/7/2025    Eliz Boston MD   nitroGLYCERIN (NITROSTAT) 0.4 MG SL tablet Place 1 tablet under the tongue every 5 minutes as needed for Chest pain up to max of 3 total doses. If no relief after 1 dose, call 911.  Patient not taking: Reported on 7/7/2025    ProviderEliz MD         REVIEW OF SYSTEM:   Review of Systems   Constitutional:  Negative for activity change, appetite change, chills, fatigue, fever and unexpected weight change.   HENT:  Positive for hearing loss. Negative for congestion, dental problem, drooling, ear discharge, ear pain, facial swelling, mouth sores, nosebleeds, rhinorrhea, sinus pressure, sinus pain, sneezing, sore throat, tinnitus and trouble swallowing.    Eyes:  Negative for photophobia, pain, discharge, redness, itching and visual disturbance.   Respiratory:  Negative for apnea, cough, choking, chest tightness, shortness of breath and wheezing.    Cardiovascular:  Negative for chest pain, palpitations and leg swelling.   Gastrointestinal:  Positive for constipation. Negative for abdominal distention, abdominal pain, diarrhea, nausea and vomiting.   Endocrine: Negative for cold intolerance and heat intolerance.   Genitourinary:  Negative for decreased urine volume, difficulty urinating, dysuria, frequency, hematuria and urgency.   Musculoskeletal:  Positive for arthralgias, back pain and gait problem. Negative for joint swelling, myalgias, neck pain and neck stiffness.   Skin:  Negative for rash and wound.   Neurological:  Negative for dizziness, tremors, seizures, syncope, speech difficulty,

## 2025-07-07 NOTE — H&P
MD Clyde   gabapentin (NEURONTIN) 300 MG capsule Take 1 capsule by mouth 3 times daily for 90 days. Intended supply: 90 days  Patient not taking: Reported on 7/7/2025 2/5/25 5/6/25  Kingston Love MD   hydrocortisone 1 % cream Apply topically 2 times daily. 3/23/24   LunamohsenSamantha, APRN - NP   aspirin 81 MG tablet Take 1 tablet by mouth daily  Patient not taking: Reported on 7/7/2025    Eliz Boston MD   nitroGLYCERIN (NITROSTAT) 0.4 MG SL tablet Place 1 tablet under the tongue every 5 minutes as needed for Chest pain up to max of 3 total doses. If no relief after 1 dose, call 911.  Patient not taking: Reported on 7/7/2025    ProviderEliz MD         REVIEW OF SYSTEM:   Review of Systems   Constitutional:  Negative for activity change, appetite change, chills, fatigue, fever and unexpected weight change.   HENT:  Positive for hearing loss. Negative for congestion, dental problem, drooling, ear discharge, ear pain, facial swelling, mouth sores, nosebleeds, rhinorrhea, sinus pressure, sinus pain, sneezing, sore throat, tinnitus and trouble swallowing.    Eyes:  Negative for photophobia, pain, discharge, redness, itching and visual disturbance.   Respiratory:  Negative for apnea, cough, choking, chest tightness, shortness of breath and wheezing.    Cardiovascular:  Negative for chest pain, palpitations and leg swelling.   Gastrointestinal:  Positive for constipation. Negative for abdominal distention, abdominal pain, diarrhea, nausea and vomiting.   Endocrine: Negative for cold intolerance and heat intolerance.   Genitourinary:  Negative for decreased urine volume, difficulty urinating, dysuria, frequency, hematuria and urgency.   Musculoskeletal:  Positive for arthralgias, back pain and gait problem. Negative for joint swelling, myalgias, neck pain and neck stiffness.   Skin:  Negative for rash and wound.   Neurological:  Negative for dizziness, tremors, seizures, syncope, speech difficulty,

## 2025-07-08 ENCOUNTER — ANESTHESIA EVENT (OUTPATIENT)
Dept: OPERATING ROOM | Age: 70
End: 2025-07-08
Payer: MEDICARE

## 2025-07-08 LAB
ABO/RH: NORMAL
ANTIBODY SCREEN: NORMAL
BLOOD GROUP ANTIBODIES SERPL: NORMAL
BLOOD GROUP ANTIBODIES SERPL: NORMAL
C (LITTLE) ANTIGEN: NORMAL
E (BIG) ANTIGEN: NORMAL
MRSA, DNA, NASAL: NEGATIVE
SPECIMEN DESCRIPTION: NORMAL

## 2025-07-11 ENCOUNTER — APPOINTMENT (OUTPATIENT)
Dept: GENERAL RADIOLOGY | Age: 70
End: 2025-07-11
Attending: NEUROLOGICAL SURGERY
Payer: MEDICARE

## 2025-07-11 ENCOUNTER — ANESTHESIA (OUTPATIENT)
Dept: OPERATING ROOM | Age: 70
End: 2025-07-11
Payer: MEDICARE

## 2025-07-11 ENCOUNTER — HOSPITAL ENCOUNTER (OUTPATIENT)
Age: 70
Setting detail: OBSERVATION
Discharge: HOME | End: 2025-07-12
Attending: NEUROLOGICAL SURGERY | Admitting: NEUROLOGICAL SURGERY
Payer: MEDICARE

## 2025-07-11 DIAGNOSIS — M48.062 SPINAL STENOSIS OF LUMBAR REGION WITH NEUROGENIC CLAUDICATION: Primary | ICD-10-CM

## 2025-07-11 PROCEDURE — 6370000000 HC RX 637 (ALT 250 FOR IP): Performed by: REGISTERED NURSE

## 2025-07-11 PROCEDURE — 64999 UNLISTED PX NERVOUS SYSTEM: CPT | Performed by: STUDENT IN AN ORGANIZED HEALTH CARE EDUCATION/TRAINING PROGRAM

## 2025-07-11 PROCEDURE — 2500000003 HC RX 250 WO HCPCS: Performed by: NEUROLOGICAL SURGERY

## 2025-07-11 PROCEDURE — 6370000000 HC RX 637 (ALT 250 FOR IP): Performed by: NEUROLOGICAL SURGERY

## 2025-07-11 PROCEDURE — 6360000002 HC RX W HCPCS: Performed by: NEUROLOGICAL SURGERY

## 2025-07-11 PROCEDURE — 6360000002 HC RX W HCPCS: Performed by: ANESTHESIOLOGIST ASSISTANT

## 2025-07-11 PROCEDURE — 2580000003 HC RX 258: Performed by: NEUROLOGICAL SURGERY

## 2025-07-11 PROCEDURE — 7100000000 HC PACU RECOVERY - FIRST 15 MIN: Performed by: NEUROLOGICAL SURGERY

## 2025-07-11 PROCEDURE — 3600000004 HC SURGERY LEVEL 4 BASE: Performed by: NEUROLOGICAL SURGERY

## 2025-07-11 PROCEDURE — 3700000001 HC ADD 15 MINUTES (ANESTHESIA): Performed by: NEUROLOGICAL SURGERY

## 2025-07-11 PROCEDURE — 7100000001 HC PACU RECOVERY - ADDTL 15 MIN: Performed by: NEUROLOGICAL SURGERY

## 2025-07-11 PROCEDURE — 3600000014 HC SURGERY LEVEL 4 ADDTL 15MIN: Performed by: NEUROLOGICAL SURGERY

## 2025-07-11 PROCEDURE — 63047 LAM FACETEC & FORAMOT LUMBAR: CPT | Performed by: NEUROLOGICAL SURGERY

## 2025-07-11 PROCEDURE — 97166 OT EVAL MOD COMPLEX 45 MIN: CPT

## 2025-07-11 PROCEDURE — G0378 HOSPITAL OBSERVATION PER HR: HCPCS

## 2025-07-11 PROCEDURE — 3700000000 HC ANESTHESIA ATTENDED CARE: Performed by: NEUROLOGICAL SURGERY

## 2025-07-11 PROCEDURE — 2709999900 HC NON-CHARGEABLE SUPPLY: Performed by: NEUROLOGICAL SURGERY

## 2025-07-11 PROCEDURE — 6360000002 HC RX W HCPCS: Performed by: STUDENT IN AN ORGANIZED HEALTH CARE EDUCATION/TRAINING PROGRAM

## 2025-07-11 PROCEDURE — 2500000003 HC RX 250 WO HCPCS: Performed by: ANESTHESIOLOGIST ASSISTANT

## 2025-07-11 PROCEDURE — 2720000010 HC SURG SUPPLY STERILE: Performed by: NEUROLOGICAL SURGERY

## 2025-07-11 PROCEDURE — C1713 ANCHOR/SCREW BN/BN,TIS/BN: HCPCS | Performed by: NEUROLOGICAL SURGERY

## 2025-07-11 PROCEDURE — 97162 PT EVAL MOD COMPLEX 30 MIN: CPT

## 2025-07-11 PROCEDURE — 63048 LAM FACETEC &FORAMOT EA ADDL: CPT | Performed by: NEUROLOGICAL SURGERY

## 2025-07-11 RX ORDER — OXYCODONE HYDROCHLORIDE 5 MG/1
5 TABLET ORAL
Status: DISCONTINUED | OUTPATIENT
Start: 2025-07-11 | End: 2025-07-11 | Stop reason: HOSPADM

## 2025-07-11 RX ORDER — MAGNESIUM HYDROXIDE 1200 MG/15ML
LIQUID ORAL CONTINUOUS PRN
Status: DISCONTINUED | OUTPATIENT
Start: 2025-07-11 | End: 2025-07-11 | Stop reason: HOSPADM

## 2025-07-11 RX ORDER — SODIUM CHLORIDE 9 MG/ML
INJECTION, SOLUTION INTRAVENOUS PRN
Status: DISCONTINUED | OUTPATIENT
Start: 2025-07-11 | End: 2025-07-11 | Stop reason: HOSPADM

## 2025-07-11 RX ORDER — SODIUM CHLORIDE 9 MG/ML
INJECTION, SOLUTION INTRAVENOUS CONTINUOUS
Status: DISCONTINUED | OUTPATIENT
Start: 2025-07-11 | End: 2025-07-12 | Stop reason: HOSPADM

## 2025-07-11 RX ORDER — LOSARTAN POTASSIUM 25 MG/1
25 TABLET ORAL DAILY
Status: DISCONTINUED | OUTPATIENT
Start: 2025-07-11 | End: 2025-07-12 | Stop reason: HOSPADM

## 2025-07-11 RX ORDER — DEXAMETHASONE SODIUM PHOSPHATE 4 MG/ML
8 INJECTION, SOLUTION INTRA-ARTICULAR; INTRALESIONAL; INTRAMUSCULAR; INTRAVENOUS; SOFT TISSUE ONCE
Status: COMPLETED | OUTPATIENT
Start: 2025-07-11 | End: 2025-07-11

## 2025-07-11 RX ORDER — FENTANYL CITRATE 50 UG/ML
INJECTION, SOLUTION INTRAMUSCULAR; INTRAVENOUS
Status: DISCONTINUED | OUTPATIENT
Start: 2025-07-11 | End: 2025-07-11 | Stop reason: SDUPTHER

## 2025-07-11 RX ORDER — SODIUM CHLORIDE 0.9 % (FLUSH) 0.9 %
5-40 SYRINGE (ML) INJECTION EVERY 12 HOURS SCHEDULED
Status: DISCONTINUED | OUTPATIENT
Start: 2025-07-11 | End: 2025-07-11 | Stop reason: HOSPADM

## 2025-07-11 RX ORDER — GLYCOPYRROLATE 0.2 MG/ML
INJECTION INTRAMUSCULAR; INTRAVENOUS
Status: DISCONTINUED | OUTPATIENT
Start: 2025-07-11 | End: 2025-07-11 | Stop reason: SDUPTHER

## 2025-07-11 RX ORDER — LIDOCAINE HYDROCHLORIDE 10 MG/ML
INJECTION, SOLUTION EPIDURAL; INFILTRATION; INTRACAUDAL; PERINEURAL
Status: DISCONTINUED | OUTPATIENT
Start: 2025-07-11 | End: 2025-07-11 | Stop reason: SDUPTHER

## 2025-07-11 RX ORDER — LIDOCAINE HYDROCHLORIDE 10 MG/ML
INJECTION, SOLUTION INFILTRATION; PERINEURAL
Status: COMPLETED | OUTPATIENT
Start: 2025-07-11 | End: 2025-07-11

## 2025-07-11 RX ORDER — LIDOCAINE HYDROCHLORIDE AND EPINEPHRINE 10; 10 MG/ML; UG/ML
INJECTION, SOLUTION INFILTRATION; PERINEURAL PRN
Status: DISCONTINUED | OUTPATIENT
Start: 2025-07-11 | End: 2025-07-11 | Stop reason: HOSPADM

## 2025-07-11 RX ORDER — CARVEDILOL 12.5 MG/1
12.5 TABLET ORAL 2 TIMES DAILY WITH MEALS
Status: DISCONTINUED | OUTPATIENT
Start: 2025-07-12 | End: 2025-07-12 | Stop reason: HOSPADM

## 2025-07-11 RX ORDER — MIDAZOLAM HYDROCHLORIDE 1 MG/ML
INJECTION, SOLUTION INTRAMUSCULAR; INTRAVENOUS
Status: COMPLETED | OUTPATIENT
Start: 2025-07-11 | End: 2025-07-11

## 2025-07-11 RX ORDER — ONDANSETRON 2 MG/ML
INJECTION INTRAMUSCULAR; INTRAVENOUS
Status: DISCONTINUED | OUTPATIENT
Start: 2025-07-11 | End: 2025-07-11 | Stop reason: SDUPTHER

## 2025-07-11 RX ORDER — DIPHENHYDRAMINE HYDROCHLORIDE 50 MG/ML
12.5 INJECTION, SOLUTION INTRAMUSCULAR; INTRAVENOUS
Status: DISCONTINUED | OUTPATIENT
Start: 2025-07-11 | End: 2025-07-11 | Stop reason: HOSPADM

## 2025-07-11 RX ORDER — OXYCODONE HYDROCHLORIDE 5 MG/1
5 TABLET ORAL EVERY 4 HOURS PRN
Status: DISCONTINUED | OUTPATIENT
Start: 2025-07-11 | End: 2025-07-12

## 2025-07-11 RX ORDER — ATORVASTATIN CALCIUM 80 MG/1
80 TABLET, FILM COATED ORAL DAILY
Status: DISCONTINUED | OUTPATIENT
Start: 2025-07-11 | End: 2025-07-12 | Stop reason: HOSPADM

## 2025-07-11 RX ORDER — SODIUM CHLORIDE 0.9 % (FLUSH) 0.9 %
5-40 SYRINGE (ML) INJECTION PRN
Status: DISCONTINUED | OUTPATIENT
Start: 2025-07-11 | End: 2025-07-11 | Stop reason: HOSPADM

## 2025-07-11 RX ORDER — SODIUM CHLORIDE 0.9 % (FLUSH) 0.9 %
5-40 SYRINGE (ML) INJECTION EVERY 12 HOURS SCHEDULED
Status: DISCONTINUED | OUTPATIENT
Start: 2025-07-11 | End: 2025-07-12 | Stop reason: HOSPADM

## 2025-07-11 RX ORDER — ROPIVACAINE HYDROCHLORIDE 5 MG/ML
INJECTION, SOLUTION EPIDURAL; INFILTRATION; PERINEURAL
Status: COMPLETED | OUTPATIENT
Start: 2025-07-11 | End: 2025-07-11

## 2025-07-11 RX ORDER — SODIUM CHLORIDE 9 MG/ML
INJECTION, SOLUTION INTRAVENOUS PRN
Status: DISCONTINUED | OUTPATIENT
Start: 2025-07-11 | End: 2025-07-12 | Stop reason: HOSPADM

## 2025-07-11 RX ORDER — SODIUM CHLORIDE 0.9 % (FLUSH) 0.9 %
5-40 SYRINGE (ML) INJECTION PRN
Status: DISCONTINUED | OUTPATIENT
Start: 2025-07-11 | End: 2025-07-12 | Stop reason: HOSPADM

## 2025-07-11 RX ORDER — OXYCODONE AND ACETAMINOPHEN 5; 325 MG/1; MG/1
1 TABLET ORAL EVERY 6 HOURS PRN
Qty: 28 TABLET | Refills: 0 | Status: SHIPPED | OUTPATIENT
Start: 2025-07-11 | End: 2025-07-18

## 2025-07-11 RX ORDER — FENTANYL CITRATE 0.05 MG/ML
50 INJECTION, SOLUTION INTRAMUSCULAR; INTRAVENOUS EVERY 10 MIN PRN
Status: DISCONTINUED | OUTPATIENT
Start: 2025-07-11 | End: 2025-07-11 | Stop reason: HOSPADM

## 2025-07-11 RX ORDER — KETOROLAC TROMETHAMINE 15 MG/ML
15 INJECTION, SOLUTION INTRAMUSCULAR; INTRAVENOUS EVERY 6 HOURS
Status: DISCONTINUED | OUTPATIENT
Start: 2025-07-11 | End: 2025-07-12

## 2025-07-11 RX ORDER — ACETAMINOPHEN 500 MG
1000 TABLET ORAL ONCE
Status: COMPLETED | OUTPATIENT
Start: 2025-07-11 | End: 2025-07-11

## 2025-07-11 RX ORDER — LIDOCAINE HYDROCHLORIDE 10 MG/ML
1 INJECTION, SOLUTION EPIDURAL; INFILTRATION; INTRACAUDAL; PERINEURAL
Status: DISCONTINUED | OUTPATIENT
Start: 2025-07-11 | End: 2025-07-11 | Stop reason: HOSPADM

## 2025-07-11 RX ORDER — ROCURONIUM BROMIDE 10 MG/ML
INJECTION, SOLUTION INTRAVENOUS
Status: DISCONTINUED | OUTPATIENT
Start: 2025-07-11 | End: 2025-07-11 | Stop reason: SDUPTHER

## 2025-07-11 RX ORDER — POLYETHYLENE GLYCOL 3350 17 G/17G
17 POWDER, FOR SOLUTION ORAL DAILY PRN
Status: DISCONTINUED | OUTPATIENT
Start: 2025-07-11 | End: 2025-07-12 | Stop reason: HOSPADM

## 2025-07-11 RX ORDER — PROPOFOL 10 MG/ML
INJECTION, EMULSION INTRAVENOUS
Status: DISCONTINUED | OUTPATIENT
Start: 2025-07-11 | End: 2025-07-11 | Stop reason: SDUPTHER

## 2025-07-11 RX ORDER — ONDANSETRON 2 MG/ML
4 INJECTION INTRAMUSCULAR; INTRAVENOUS EVERY 6 HOURS PRN
Status: DISCONTINUED | OUTPATIENT
Start: 2025-07-11 | End: 2025-07-12

## 2025-07-11 RX ORDER — KETOROLAC TROMETHAMINE 15 MG/ML
15 INJECTION, SOLUTION INTRAMUSCULAR; INTRAVENOUS ONCE
Status: COMPLETED | OUTPATIENT
Start: 2025-07-11 | End: 2025-07-11

## 2025-07-11 RX ORDER — METOCLOPRAMIDE HYDROCHLORIDE 5 MG/ML
10 INJECTION INTRAMUSCULAR; INTRAVENOUS
Status: DISCONTINUED | OUTPATIENT
Start: 2025-07-11 | End: 2025-07-11 | Stop reason: HOSPADM

## 2025-07-11 RX ORDER — EPHEDRINE SULFATE/0.9% NACL/PF 25 MG/5 ML
SYRINGE (ML) INTRAVENOUS
Status: DISCONTINUED | OUTPATIENT
Start: 2025-07-11 | End: 2025-07-11 | Stop reason: SDUPTHER

## 2025-07-11 RX ORDER — BISACODYL 5 MG/1
5 TABLET, DELAYED RELEASE ORAL DAILY
Status: DISCONTINUED | OUTPATIENT
Start: 2025-07-11 | End: 2025-07-12 | Stop reason: HOSPADM

## 2025-07-11 RX ORDER — OXYCODONE HYDROCHLORIDE 5 MG/1
5 TABLET ORAL EVERY 6 HOURS PRN
Status: DISCONTINUED | OUTPATIENT
Start: 2025-07-11 | End: 2025-07-12 | Stop reason: HOSPADM

## 2025-07-11 RX ORDER — ONDANSETRON 2 MG/ML
4 INJECTION INTRAMUSCULAR; INTRAVENOUS
Status: DISCONTINUED | OUTPATIENT
Start: 2025-07-11 | End: 2025-07-11 | Stop reason: HOSPADM

## 2025-07-11 RX ORDER — ACETAMINOPHEN 325 MG/1
650 TABLET ORAL EVERY 6 HOURS
Status: DISCONTINUED | OUTPATIENT
Start: 2025-07-11 | End: 2025-07-12 | Stop reason: HOSPADM

## 2025-07-11 RX ADMIN — SUGAMMADEX 200 MG: 100 INJECTION, SOLUTION INTRAVENOUS at 11:25

## 2025-07-11 RX ADMIN — SODIUM CHLORIDE: 0.9 INJECTION, SOLUTION INTRAVENOUS at 08:08

## 2025-07-11 RX ADMIN — BISACODYL 5 MG: 5 TABLET, COATED ORAL at 15:32

## 2025-07-11 RX ADMIN — KETOROLAC TROMETHAMINE 15 MG: 15 INJECTION, SOLUTION INTRAMUSCULAR; INTRAVENOUS at 06:29

## 2025-07-11 RX ADMIN — Medication 10 ML: at 22:22

## 2025-07-11 RX ADMIN — ONDANSETRON 4 MG: 2 INJECTION INTRAMUSCULAR; INTRAVENOUS at 11:25

## 2025-07-11 RX ADMIN — EPHEDRINE SULFATE 12.5 MG: 5 INJECTION INTRAVENOUS at 08:08

## 2025-07-11 RX ADMIN — DEXAMETHASONE SODIUM PHOSPHATE 8 MG: 4 INJECTION, SOLUTION INTRAMUSCULAR; INTRAVENOUS at 06:29

## 2025-07-11 RX ADMIN — ATORVASTATIN CALCIUM 80 MG: 80 TABLET, FILM COATED ORAL at 15:23

## 2025-07-11 RX ADMIN — ROPIVACAINE HYDROCHLORIDE 30 ML: 5 INJECTION, SOLUTION EPIDURAL; INFILTRATION; PERINEURAL at 07:18

## 2025-07-11 RX ADMIN — PHENYLEPHRINE HYDROCHLORIDE 0.1 MG: 100 INJECTION INTRAVENOUS at 08:56

## 2025-07-11 RX ADMIN — PROPOFOL 150 MG: 10 INJECTION, EMULSION INTRAVENOUS at 07:41

## 2025-07-11 RX ADMIN — PHENYLEPHRINE HYDROCHLORIDE 0.1 MG: 100 INJECTION INTRAVENOUS at 08:40

## 2025-07-11 RX ADMIN — SODIUM CHLORIDE: 0.9 INJECTION, SOLUTION INTRAVENOUS at 21:47

## 2025-07-11 RX ADMIN — SODIUM CHLORIDE: 0.9 INJECTION, SOLUTION INTRAVENOUS at 13:12

## 2025-07-11 RX ADMIN — WATER 2000 MG: 1 INJECTION INTRAMUSCULAR; INTRAVENOUS; SUBCUTANEOUS at 15:23

## 2025-07-11 RX ADMIN — ACETAMINOPHEN 1000 MG: 500 TABLET ORAL at 06:29

## 2025-07-11 RX ADMIN — ROCURONIUM BROMIDE 20 MG: 10 INJECTION, SOLUTION INTRAVENOUS at 08:13

## 2025-07-11 RX ADMIN — ROCURONIUM BROMIDE 10 MG: 10 INJECTION, SOLUTION INTRAVENOUS at 09:35

## 2025-07-11 RX ADMIN — LOSARTAN POTASSIUM 25 MG: 25 TABLET, FILM COATED ORAL at 15:23

## 2025-07-11 RX ADMIN — SODIUM CHLORIDE: 0.9 INJECTION, SOLUTION INTRAVENOUS at 07:39

## 2025-07-11 RX ADMIN — ACETAMINOPHEN 650 MG: 325 TABLET ORAL at 18:19

## 2025-07-11 RX ADMIN — LIDOCAINE HYDROCHLORIDE 5 ML: 10 INJECTION, SOLUTION INFILTRATION; PERINEURAL at 07:18

## 2025-07-11 RX ADMIN — KETOROLAC TROMETHAMINE 15 MG: 15 INJECTION, SOLUTION INTRAMUSCULAR; INTRAVENOUS at 13:11

## 2025-07-11 RX ADMIN — EPHEDRINE SULFATE 12.5 MG: 5 INJECTION INTRAVENOUS at 08:15

## 2025-07-11 RX ADMIN — ROCURONIUM BROMIDE 50 MG: 10 INJECTION, SOLUTION INTRAVENOUS at 07:41

## 2025-07-11 RX ADMIN — CEFAZOLIN 2000 MG: 2 INJECTION, POWDER, FOR SOLUTION INTRAMUSCULAR; INTRAVENOUS at 07:51

## 2025-07-11 RX ADMIN — FENTANYL CITRATE 100 MCG: 50 INJECTION, SOLUTION INTRAMUSCULAR; INTRAVENOUS at 07:41

## 2025-07-11 RX ADMIN — MIDAZOLAM HYDROCHLORIDE 2 MG: 1 INJECTION, SOLUTION INTRAMUSCULAR; INTRAVENOUS at 07:18

## 2025-07-11 RX ADMIN — ACETAMINOPHEN 650 MG: 325 TABLET ORAL at 13:11

## 2025-07-11 RX ADMIN — PHENYLEPHRINE HYDROCHLORIDE 0.1 MG: 100 INJECTION INTRAVENOUS at 09:30

## 2025-07-11 RX ADMIN — GLYCOPYRROLATE 0.2 MG: 0.2 INJECTION INTRAMUSCULAR; INTRAVENOUS at 07:41

## 2025-07-11 RX ADMIN — PHENYLEPHRINE HYDROCHLORIDE 0.1 MG: 100 INJECTION INTRAVENOUS at 08:02

## 2025-07-11 RX ADMIN — SODIUM CHLORIDE: 0.9 INJECTION, SOLUTION INTRAVENOUS at 10:30

## 2025-07-11 RX ADMIN — KETOROLAC TROMETHAMINE 15 MG: 15 INJECTION, SOLUTION INTRAMUSCULAR; INTRAVENOUS at 18:19

## 2025-07-11 RX ADMIN — LIDOCAINE HYDROCHLORIDE 25 MG: 10 INJECTION, SOLUTION EPIDURAL; INFILTRATION; INTRACAUDAL; PERINEURAL at 07:41

## 2025-07-11 ASSESSMENT — PAIN DESCRIPTION - ORIENTATION: ORIENTATION: RIGHT;LEFT

## 2025-07-11 ASSESSMENT — PAIN DESCRIPTION - LOCATION
LOCATION: BACK
LOCATION: BACK;LEG
LOCATION: BACK

## 2025-07-11 ASSESSMENT — PAIN SCALES - GENERAL
PAINLEVEL_OUTOF10: 6
PAINLEVEL_OUTOF10: 6
PAINLEVEL_OUTOF10: 1

## 2025-07-11 ASSESSMENT — PAIN - FUNCTIONAL ASSESSMENT: PAIN_FUNCTIONAL_ASSESSMENT: 0-10

## 2025-07-11 NOTE — BRIEF OP NOTE
Brief Postoperative Note      Patient: Bayron Maya  YOB: 1955  MRN: [id number]    Date of Procedure: 7/11/2025    Pre-Op Diagnosis Codes:      * Spinal stenosis of lumbar region with neurogenic claudication [M48.062]    Post-Op Diagnosis: Same       Procedure(s):  L3-4, 4-5 decompression medial facetectomy foraminotomies    Surgeon(s):  Anais Juan MD    Assistant:  First Assistant: Gina Rojas    Anesthesia: General    Estimated Blood Loss (mL): less than 50     Complications: None          Drains:   Closed/Suction Drain Inferior Back Other (Comment) (Active)   Site Description Clean, dry & intact 07/11/25 1212   Dressing Status Clean, dry & intact 07/11/25 1212   Drainage Appearance Bloody 07/11/25 1212   Drain Status Compressed 07/11/25 1148       Findings:  Present At Time Of Surgery (PATOS) (choose all levels that have infection present):  No infection present  Other Findings: Stenosis    Electronically signed by ANAIS JUAN MD on 7/11/2025 at 2:43 PM

## 2025-07-11 NOTE — PROGRESS NOTES
[facility] OCCUPATIONAL THERAPY EVALUATION - ACUTE     NAME: Bayron Maya  : 1955 (70 y.o.)  MRN: [id number]  CODE STATUS: Full Code  Room: W292/W292-01    Date of Service: 2025    Patient Diagnosis(es): Spinal stenosis of lumbar region with neurogenic claudication [M48.062]  Lumbar stenosis with neurogenic claudication [M48.062]   Patient Active Problem List    Diagnosis Date Noted    Lumbar stenosis with neurogenic claudication 2025    Chronic anticoagulation 2025    Acquired spondylolisthesis of lumbosacral region 2025    Spinal stenosis of lumbar region with neurogenic claudication 2025    H/O: CVA (cerebrovascular accident) 2025    Moderate mitral regurgitation 2025    Expressive aphasia 2025    Anemia 2024    H/O total shoulder replacement, right 2024    Kidney stone 2019    S/P coronary artery bypass graft x 5 10/01/2018    Essential hypertension 2018    Hyperlipidemia 2015      No data found    Past Medical History:   Diagnosis Date    Acute kidney injury 10/03/2018    Arthritis     Broken leg     Left    Cerebral artery occlusion with cerebral infarction (HCC) 2025    Chronic anticoagulation 2025    Congenital heart disease     Coronary atherosclerosis 2015    Hearing loss     HTN (hypertension) 2015    Hyperlipidemia 2015    Myocardial infarction (HCC) 2025    Comment on above: NON STEMI      Obesity 2023    PONV (postoperative nausea and vomiting)      Past Surgical History:   Procedure Laterality Date    CARDIAC SURGERY      CABG x5    CYSTOSCOPY N/A 2019    FLEXIBLE CYSTOSCOPY AND RIGHT STENT REMOVAL performed by Tobin Michelle MD at [facility] OR    CYSTOSCOPY INSERTION / REMOVAL STENT / STONE Right 2019    CYSTOSCOPY RIGHT DOUBLE J STENT PLACEMENT performed by Tobin Michelle MD at [facility] OR    EYE SURGERY      JOINT REPLACEMENT      left shoulder, both knees    KNEE

## 2025-07-11 NOTE — PROGRESS NOTES
CLINICAL PHARMACY NOTE: MEDS TO BEDS    Total # of Prescriptions Filled: 1   The following medications were delivered to the patient:  Oxycodone/APAP 5-325mg Tab     Additional Documentation:

## 2025-07-11 NOTE — ANESTHESIA PROCEDURE NOTES
Peripheral Block    Patient location during procedure: pre-op  Reason for block: post-op pain management and at surgeon's request  Start time: 7/11/2025 7:18 AM  End time: 7/11/2025 7:27 AM  Staffing  Performed: anesthesiologist   Anesthesiologist: Pb Laguerre DO  Performed by: Pb Laguerre DO  Authorized by: Pb Laguerre DO    Preanesthetic Checklist  Completed: patient identified, IV checked, site marked, risks and benefits discussed, surgical/procedural consents, equipment checked, pre-op evaluation, timeout performed, anesthesia consent given, oxygen available and monitors applied/VS acknowledged  Peripheral Block   Patient position: supine  Prep: ChloraPrep  Provider prep: mask and sterile gloves (Sterile probe cover)  Patient monitoring: cardiac monitor, continuous pulse ox, frequent blood pressure checks and IV access  Block type: Erector spinae (L3)  Laterality: bilateral  Injection technique: single-shot  Guidance: ultrasound guided  Local infiltration: lidocaine  Infiltration strength: 1 %  Local infiltration: lidocaine  Dose: 5 mL    Needle   Needle type: combined needle/nerve stimulator   Needle gauge: 22 G  Needle localization: anatomical landmarks and ultrasound guidance  Needle length: 5 cm  Assessment   Injection assessment: negative aspiration for heme, no paresthesia on injection and local visualized surrounding nerve on ultrasound  Paresthesia pain: immediately resolved  Slow fractionated injection: yes  Hemodynamics: stable    Additional Notes  Ultrasound guidance used to view needle for placement.    Ultrasound image stored,  printed and saved in patient chart.    Sterile probe cover used    Medications Administered  midazolam (VERSED) injection 2 mg/2mL - IntraVENous   2 mg - 7/11/2025 7:18:00 AM  lidocaine injection 1% - Perineural   5 mL - 7/11/2025 7:18:00 AM  ropivacaine (NAROPIN) injection 0.5% - Perineural   30 mL - 7/11/2025 7:18:00 AM

## 2025-07-11 NOTE — CARE COORDINATION
Case Management Assessment  Initial Evaluation    Date/Time of Evaluation: 7/11/2025 2:54 PM  Assessment Completed by: Lisa Kuo RN    If patient is discharged prior to next notation, then this note serves as note for discharge by case management.    Patient Name: Bayron Maya                   YOB: 1955  Diagnosis: Spinal stenosis of lumbar region with neurogenic claudication [M48.062]  Lumbar stenosis with neurogenic claudication [M48.062]                   Date / Time: 7/11/2025  5:38 AM    Patient Admission Status: Observation   Readmission Risk (Low < 19, Mod (19-27), High > 27): No data recorded  Current PCP: Kingston Love MD  PCP verified by CM? Yes    Chart Reviewed: Yes      History Provided by: Patient  Patient Orientation: Alert and Oriented, Person, Place, Situation, Self    Patient Cognition: Alert    Hospitalization in the last 30 days (Readmission):  No    If yes, Readmission Assessment in  Navigator will be completed.    Advance Directives:      Code Status: Full Code   Patient's Primary Decision Maker is:      Primary Decision Maker: ZulmaBenigno - Child - [phone]    Discharge Planning:    Patient lives with:   Type of Home:    Primary Care Giver: Self  Patient Support Systems include: Children   Current Financial resources:    Current community resources:    Current services prior to admission:              Current DME:              Type of Home Care services:       ADLS  Prior functional level: Independent in ADLs/IADLs  Current functional level: Assistance with the following:, Mobility (POST OP PT/OT PENDING)    PT AM-PAC:   /24  OT AM-PAC:   /24    Family can provide assistance at DC: Yes  Would you like Case Management to discuss the discharge plan with any other family members/significant others, and if so, who? Yes (DTR IF NEEDED)  Plans to Return to Present Housing: Unknown at present  Other Identified Issues/Barriers to RETURNING to current housing:

## 2025-07-11 NOTE — PLAN OF CARE
See OT evaluation for all goals and OT POC. Electronically signed by Yvonne Mendez OTR/L on 7/11/2025 at 4:09 PM

## 2025-07-11 NOTE — INTERVAL H&P NOTE
Update History & Physical    The patient's History and Physical of  was reviewed with the patient and I examined the patient. There was no change. The surgical site was confirmed by the patient and me.     Plan: The risks, benefits, expected outcome, and alternative to the recommended procedure have been discussed with the patient. Patient understands and wants to proceed with the procedure.     Electronically signed by MEGAN ARAUJO MD on 7/11/2025 at 6:45 AM

## 2025-07-11 NOTE — DISCHARGE INSTR - COC
Continuity of Care Form    Patient Name: Bayron Maya   :  1955  MRN:  [id number]    Admit date:  2025  Discharge date:  ***    Code Status Order: Full Code   Advance Directives:    Date/Time Healthcare Directive Type of Healthcare Directive Copy in Chart Healthcare Agent Appointed Healthcare Agent's Name Healthcare Agent's Phone Number    [phone] No, patient does not have an advance directive for healthcare treatment  --  --  --  --  --             Admitting Physician:  Anais Juan MD  PCP: Kingston Love MD    Discharging Nurse: ***  Discharging Hospital Unit/Room#: [facility] OR Pool/NONE  Discharging Unit Phone Number: ***    Emergency Contact:   Extended Emergency Contact Information  Primary Emergency Contact: Benigno Maya   [facility]  Home Phone: [phone]  Work Phone: [phone]  Mobile Phone: [phone]  Relation: Child    Past Surgical History:  Past Surgical History:   Procedure Laterality Date    CARDIAC SURGERY      CABG x5    CYSTOSCOPY N/A 2019    FLEXIBLE CYSTOSCOPY AND RIGHT STENT REMOVAL performed by Tobin Michelle MD at [facility] [place]    CYSTOSCOPY INSERTION / REMOVAL STENT / STONE Right 2019    CYSTOSCOPY RIGHT DOUBLE J STENT PLACEMENT performed by Tobin Michelle MD at [facility] OR    EYE SURGERY      JOINT REPLACEMENT      left shoulder, both knees    KNEE ARTHROPLASTY      LITHOTRIPSY Right 05/15/2019    RIGHT ESWL performed by Tobin Michelle MD at [facility] [place]    LITHOTRIPSY N/A 07/10/2019    HOLMIUM  LASER LITHOTRIPSY REMOVAL OF STENT performed by Tobin Michelle MD at [facility] [place]    LITHOTRIPSY N/A 2019    HOLMIUM  LASER LITHOTRIPSY / INSERTION J STENT RIGHT performed by Tobin Michelle MD at [facility]    MS COLON CA SCRN NOT HI RSK IND N/A 10/17/2018    COLONOSCOPY performed by Judy Mendez MD at [facility] OR    URETER SURGERY N/A 07/10/2019    FLEXIBLE URETEROSCOPY, CYSTOSCOPY RETROGRADE URETEROSCOPY, STONE EXTRACTION, AND STENT INSERTION  performed by Tobin Michelle MD at [facility] [place]    URETER SURGERY Right 07/31/2019    RIGHT URETEROSCOPY performed by Tobin Michelle MD at [facility]       Immunization History:   Immunization History   Administered Date(s) Administered    COVID-19, PFIZER PURPLE top, DILUTE for use, (age 12 y+), 30mcg/0.3mL 01/26/2021, 02/17/2021, 12/02/2021    Pneumococcal, PCV-13, PREVNAR 13, (age 6w+), IM, 0.5mL 10/01/2018    Pneumococcal, PCV20, PREVNAR 20, (age 6w+), IM, 0.5mL 03/27/2024       Active Problems:  Patient Active Problem List   Diagnosis Code    Hyperlipidemia E78.5    S/P coronary artery bypass graft x 5 Z95.1    Kidney stone N20.0    Essential hypertension I10    Chronic anticoagulation Z79.01    Acquired spondylolisthesis of lumbosacral region M43.17    Spinal stenosis of lumbar region with neurogenic claudication M48.062    Anemia D64.9    Expressive aphasia R47.01    H/O total shoulder replacement, right Z96.611    H/O: CVA (cerebrovascular accident) Z86.73    Moderate mitral regurgitation I34.0       Isolation/Infection:   Isolation            No Isolation          Patient Infection Status    None to display         Nurse Assessment:  Last Vital Signs: BP (!) 157/74   Pulse 57   Temp 97.5 °F (36.4 °C) (Temporal)   Resp 16   Ht 1.727 m (5' 8\")   Wt 97.5 kg (215 lb)   SpO2 96%   BMI 32.69 kg/m²     Last documented pain score (0-10 scale): Pain Level: 6  Last Weight:   Wt Readings from Last 1 Encounters:   07/11/25 97.5 kg (215 lb)     Mental Status:  {IP PT MENTAL STATUS:34264}    IV Access:  { VIJAY IV ACCESS:647066956}    Nursing Mobility/ADLs:  Walking   {CHP DME ADLs:899356652}  Transfer  {CHP DME ADLs:166781773}  Bathing  {CHP DME ADLs:052982080}  Dressing  {CHP DME ADLs:569812508}  Toileting  {CHP DME ADLs:370661259}  Feeding  {CHP DME ADLs:674110270}  Med Admin  {CHP DME ADLs:664817126}  Med Delivery   { VIJAY MED Delivery:798460873}    Wound Care Documentation and Therapy:

## 2025-07-11 NOTE — CONSULTS
Consult Note            Date:7/11/2025        Patient Name:Bayron Maya     YOB: 1955     Age:70 y.o.    Reason for Consult: Assist with chronic disease management postoperatively    Chief Complaint   Lumbar stenosis with neurogenic claudication      History Obtained From   patient, electronic medical record    History of Present Illness   Patient is a 70-year-old male status post left bilateral-4,-microdecompression, medial facetectomy, foraminotomy postop day 0.  Patient reported lower back pain and sciatic pain that interfered with ADLs and gait ability.  Patient denies chest pain, palpitations, headache, dizziness, shortness of breath, cough, fever, chills, N/V/D, and changes in appetite.  Patient has a past medical history of MI S/P CABG x 5, CVA, HTN, HLD, anemia and chronic anticoagulation use.  Hospitalist were consulted to assist with chronic disease management postoperatively.    Past Medical History     Past Medical History:   Diagnosis Date    Acute kidney injury 10/03/2018    Arthritis     Broken leg     Left    Cerebral artery occlusion with cerebral infarction (HCC) 04/2025    Chronic anticoagulation 05/08/2025    Congenital heart disease     Coronary atherosclerosis 09/08/2015    Hearing loss     HTN (hypertension) 09/08/2015    Hyperlipidemia 09/08/2015    Myocardial infarction (HCC) 07/07/2025    Comment on above: NON STEMI      Obesity 09/28/2023    PONV (postoperative nausea and vomiting)         Past Surgical History     Past Surgical History:   Procedure Laterality Date    CARDIAC SURGERY      CABG x5    CYSTOSCOPY N/A 08/09/2019    FLEXIBLE CYSTOSCOPY AND RIGHT STENT REMOVAL performed by Tobin Michelle MD at [facility] OR    CYSTOSCOPY INSERTION / REMOVAL STENT / STONE Right 05/09/2019    CYSTOSCOPY RIGHT DOUBLE J STENT PLACEMENT performed by Tobin Michelle MD at [facility] OR    EYE SURGERY      JOINT REPLACEMENT      left shoulder, both knees    KNEE ARTHROPLASTY      LITHOTRIPSY  surgeon    *Pain management, diet, activity, DVT prophylaxis  - Per surgeon    * MI S/P CABG x, hypertension, hyperlipidemia  - Patient on atorvastatin 80 mg daily, losartan, and Coreg  -May resume any DAPT once okay with surgeon    *CVA  - On intermittent pneumatic compression device.  May resume any DAPT once okay with surgeon      *Anemia; stable Hgb 13.2    Thank you for consult.  Additional work up or/and treatment plan may be added today or then after based on clinical progression by other providers or specialists.  Patient will need to follow-up with PCP for chronic disease management.     I have spent greater than 70% of time  spent focused exclusively on this patient ,reviewing  chart, labs/diagnostics, reconciling medications, &  answering questions with patient and discussing plan.    Electronically signed by MARI Adame CNP on 7/11/25 at 1:43 PM EDT

## 2025-07-11 NOTE — DISCHARGE INSTRUCTIONS
Every day after surgery change dressing frequently to keep wound clean and dry using 4X4 gauze pads and paper tape.    May shower in 3 days postoperative.    Do not soak or soap wound for one week post operative.    Discharge prescriptions e-prescribed to pharmacy.  Order done  as outpatient.  Percocet 5/325 #28 [facility] pharmacy    Recheck one month.    Questions call office .

## 2025-07-11 NOTE — PROGRESS NOTES
Physical Therapy Med Surg Initial Assessment  [place] [facility] [place]  Room: [facility]/W292-       NAME: Bayron Maya  : 1955 (70 y.o.)  MRN: [id number]  CODE STATUS: Full Code    Date of Service: 2025    Patient Diagnosis(es): Spinal stenosis of lumbar region with neurogenic claudication [M48.062]  Lumbar stenosis with neurogenic claudication [M48.062]   No chief complaint on file.    Patient Active Problem List    Diagnosis Date Noted    Lumbar stenosis with neurogenic claudication 2025    Chronic anticoagulation 2025    Acquired spondylolisthesis of lumbosacral region 2025    Spinal stenosis of lumbar region with neurogenic claudication 2025    H/O: CVA (cerebrovascular accident) 2025    Moderate mitral regurgitation 2025    Expressive aphasia 2025    Anemia 2024    H/O total shoulder replacement, right 2024    Kidney stone 2019    S/P coronary artery bypass graft x 5 10/01/2018    Essential hypertension 2018    Hyperlipidemia 2015        Past Medical History:   Diagnosis Date    Acute kidney injury 10/03/2018    Arthritis     Broken leg     Left    Cerebral artery occlusion with cerebral infarction (HCC) 2025    Chronic anticoagulation 2025    Congenital heart disease     Coronary atherosclerosis 2015    Hearing loss     HTN (hypertension) 2015    Hyperlipidemia 2015    Myocardial infarction (HCC) 2025    Comment on above: NON STEMI      Obesity 2023    PONV (postoperative nausea and vomiting)      Past Surgical History:   Procedure Laterality Date    CARDIAC SURGERY      CABG x5    CYSTOSCOPY N/A 2019    FLEXIBLE CYSTOSCOPY AND RIGHT STENT REMOVAL [place] [facility] [place]    CYSTOSCOPY INSERTION / REMOVAL STENT / STONE Right 2019    CYSTOSCOPY RIGHT DOUBLE J STENT PLACEMENT performed by Tobin [place] [facility] [place]    EYE SURGERY      JOINT  pole, wound drain)  Assistance: Stand by assistance  Quality of Gait: Unsteady gait, increased KATARZYNA, decreased step length  Distance: 3 steps forward and retro  Comments: Pt unsteady on feet, no LOB but delayed reactions. Pt limited by endurance and dizziness so did not leave the bedside.  More Ambulation?: No    Stairs/Curb  Stairs?: No (unsafe to assess)              Activity Tolerance  Activity Tolerance Comments: Pt limited by dizziness in standing    Patient Education  Education Given To: Patient  Education Provided: Role of Therapy;Plan of Care;Transfer Training;Mobility Training  Education Method: Verbal  Education Outcome: Verbalized understanding       ASSESSMENT:   Body Structures, Functions, Activity Limitations Requiring Skilled Therapeutic Intervention: Decreased functional mobility ;Decreased balance;Decreased posture;Decreased strength;Decreased ROM  History: med  Exam: med  Clinical Presentation: med    Therapy Prognosis: Good    Treatment Diagnosis: unsteadiness on feet         DISCHARGE RECOMMENDATIONS:  Discharge Recommendations: Continue to assess pending progress    Assessment: Pt is a 69 yo male presenting s/p  Left bilateral lumbar 3-4, 4-5 microdecompression, medial facetectomy, foraminotomy on 7/11. During evaluation, pt was able to perform bed mobility w/ limited intervention from PT, however upon standing pt reported feeling dizzy w/ a mild LOB. Pt was sat back on bed and was negative for orthostatic hypotension. Pt able to take a few steps at bedside but limited due to dizziness. Pt will benefit from continued therapy to progress toward the highest level of indep at D/C.  Requires PT Follow-Up: Yes       PLAN OF CARE:  Physical Therapy Plan  General Plan: 2 times a day 7 days a week (if needed)  Current Treatment Recommendations: Strengthening;ROM;Balance training;Functional mobility training;Transfer training;Gait training;Stair training;Neuromuscular re-education;Pain management;Home

## 2025-07-11 NOTE — PLAN OF CARE
50mL bloody drainage removed from hemovac.    6/10 pain, scheduled meds given per MAR. Hemovac incision site dressing CDI, sutures PAVAN. Fluids infusing. No signs of distress, pt resting in bed, pleasant and oriented.      Problem: Discharge Planning  Goal: Discharge to home or other facility with appropriate resources  Outcome: Progressing  Flowsheets (Taken 7/11/2025 1300)  Discharge to home or other facility with appropriate resources: Identify barriers to discharge with patient and caregiver     Problem: Chronic Conditions and Co-morbidities  Goal: Patient's chronic conditions and co-morbidity symptoms are monitored and maintained or improved  Outcome: Progressing  Flowsheets (Taken 7/11/2025 1300)  Care Plan - Patient's Chronic Conditions and Co-Morbidity Symptoms are Monitored and Maintained or Improved: Monitor and assess patient's chronic conditions and comorbid symptoms for stability, deterioration, or improvement     Problem: Pain  Goal: Verbalizes/displays adequate comfort level or baseline comfort level  Outcome: Progressing     Problem: Safety - Adult  Goal: Free from fall injury  Outcome: Progressing  Flowsheets (Taken 7/11/2025 1300)  Free From Fall Injury: Instruct family/caregiver on patient safety     Problem: Occupational Therapy - Adult  Goal: By Discharge: Performs self-care activities at highest level of function for planned discharge setting.  See evaluation for individualized goals.  7/11/2025 1609 by Yvonne Mendez, OTR/L  Outcome: Progressing

## 2025-07-11 NOTE — CARE COORDINATION
[facility] Pre-Admission Screening Document      Patient Name: Bayron Maya       MRN: [id number]    : 1955    Age: 70 y.o.  Gender: male   [facility] / Plan: [facility] [place] MEDICARE / Product Type: *No Product type* /   MSSP: No    Admitted from: [facility] Floor: 2W  Attending Care Provider: Anais Juan MD  Inpatient Rehab Referring Care Provider: Dr. Juan  Primary Care Provider: Kingston Love MD  Inpatient Treatment Team including Consults: Treatment Team:   Anais Juan MD Hazen, Gale, MD    Reason for Hospitalization: No diagnosis found.  No chief complaint on file.    Isolation:No active isolations    Hospital Course:  Admit Date: 2025  5:38 AM  Inpatient Rehab Referral Date: 2025  Narrative of hospital course/history of present illness: 70 y.o. male patient with Hx of right sciatic pain and was given PO steroids without relief. Eventually was referred to pain management where steroid injections were given as well. Work up showing lumbar stenosis and was found to be a surgical candidate. Patient admitted  for an elective L3-5 decompression with Dr. Juan.      Medical & Surgical History/Current Comorbidities:  Past Medical History:   Diagnosis Date    Acute kidney injury 10/03/2018    Arthritis     Broken leg     Left    Cerebral artery occlusion with cerebral infarction (HCC) 2025    Chronic anticoagulation 2025    Congenital heart disease     Coronary atherosclerosis 2015    Hearing loss     HTN (hypertension) 2015    Hyperlipidemia 2015    Myocardial infarction (HCC) 2025    Comment on above: NON STEMI      Obesity 2023    PONV (postoperative nausea and vomiting)      Past Surgical History:   Procedure Laterality Date    CARDIAC SURGERY      CABG x5    CYSTOSCOPY N/A 2019    FLEXIBLE CYSTOSCOPY AND RIGHT STENT REMOVAL performed by Tobin Michelle MD  flush, sodium chloride, lidocaine 1 % injection, sodium chloride flush, sodium chloride, lidocaine-EPINEPHrine, sod chloride IRR soln, thrombin    Allergies:  No Known Allergies      Most Recent Vitals, Height and Weight  BP (!) 157/74   Pulse 57   Temp 97.5 °F (36.4 °C) (Temporal)   Resp 16   Ht 1.727 m (5' 8\")   Wt 97.5 kg (215 lb)   SpO2 96%   BMI 32.69 kg/m²     Weight Bearing Restrictions: None       Current Diet Order: Diet NPO Exceptions are: Sips of Water with Meds    Skin: L spine incision   Wound Care Documentation:          Lungs:          Cognition and Behavior:  Language Preference (if other than English):      Alertness/Behavior  Neuro (WDL): Within Defined Limits  Level of Consciousness: Alert (0)      Short Term Memory Deficits          Safety          Prior Level of Function and Living Arrangements:     Dental Appliances: None  Vision - Corrective Lenses: Eyeglasses (In bag at HOB)  Hearing Aid: None  Personal Equipment:   Dental Appliances: None  Vision - Corrective Lenses: Eyeglasses (In bag at HOB)  Hearing Aid: None      CURRENT FUNCTIONAL LEVEL:  Physical Therapy  Bed mobility:     Transfers:     Gait:      Stairs:     W/C mobility:         Occupational Therapy                     Speech Language Pathology ***           Diet/Swallow:                     Current Conditions Requiring Inpatient Rehabilitation  Bowel/Bladder Dysfunction: Yes  Intervention Required = Frequent toileting, Bowel program, Wean Cuevas, and Check post void residual  Risk for Medical/Clinical Complications = high  Skin Healing/Breakdown Risk: Yes  Intervention Required = Side to side turns, Dressing changes, Surgical incision, and Monitor for S/S of infection  Risk for Medical/Clinical Complications = moderate  Nutrition/Hydration Deficiency: Yes  Intervention Required = Monitor I&Os, Check Labs, and Dietary Eval  Risk for Medical/Clinical Complications = moderate  Medical Comorbidities: Yes  Intervention Required =

## 2025-07-11 NOTE — ANESTHESIA PRE PROCEDURE
Department of Anesthesiology  Preprocedure Note       Name:  Bayron Maya   Age:  70 y.o.  :  1955                                          MRN:  [id number]         Date:  2025      Surgeon: Surgeon(s):  Anais Juan MD    Procedure: Procedure(s):  L3-4-5 decompression medial facetectomy foraminotomies / 2 HOURS / 1 C-ARM / JOSH SITE / PRONE / Dr. Deluca cardiology preoperative assessment -CLEARANCE RECD / PLAVIX AND ASPIRIN NEED STOPPED 7 DAYS PRIOR TO SURGERY/ POWER MANJUUR  Can you please call pts wife with Surgery info the day before the procedure?   Her name is Lea Maya:  [phone]    Medications prior to admission:   Prior to Admission medications    Medication Sig Start Date End Date Taking? Authorizing Provider   carvedilol (COREG) 12.5 MG tablet Take 1 tablet by mouth 2 times daily (with meals) 1 tablet 2 times daily   Yes Eliz Boston MD   atorvastatin (LIPITOR) 80 MG tablet Take 1 tablet by mouth daily 23 Yes ProviderEliz MD   losartan (COZAAR) 25 MG tablet Take 1 tablet by mouth daily   Yes ProviderEliz MD   Coenzyme Q10 (CO Q 10 PO) Take by mouth   Yes Eliz Boston MD   oxyCODONE-acetaminophen (PERCOCET) 5-325 MG per tablet Take 1 tablet by mouth every 6 hours as needed for Pain for up to 7 days. Intended supply: 7 days. Take lowest dose possible to manage pain Max Daily Amount: 4 tablets 25  Anais Juan MD   clopidogrel (PLAVIX) 75 MG tablet Take 1 tablet by mouth daily 25  ProviderEilz MD   lidocaine (LMX) 4 % cream Apply a half dollar sized amount to intact skin topically up to twice daily as needed for pain 25   Clyde Hernandez MD   gabapentin (NEURONTIN) 300 MG capsule Take 1 capsule by mouth 3 times daily for 90 days. Intended supply: 90 days  Patient not taking: Reported on 2025  Kingston Love MD   hydrocortisone 1 % cream Apply topically 2 times daily. 3/23/24

## 2025-07-11 NOTE — OP NOTE
[facility]                   [address] [place] [address]                            OPERATIVE REPORT      PATIENT NAME: OWEN MERIDA           : 1955  MED REC NO: [id number]                        ROOM: [facility]  ACCOUNT NO: [id number]                       ADMIT DATE: 2025  PROVIDER: Anais Juan MD      DATE OF PROCEDURE:  2025    SURGEON:  Anais Juan MD    PREOPERATIVE DIAGNOSIS:  L3-4, L4-5 canal stenosis with neurogenic claudication.    OPERATION PERFORMED:  Left bilateral lumbar 3-4, 4-5 microdecompression, medial facetectomy, foraminotomy.    INDICATIONS:  Gait deterioration.    DESCRIPTION:  The patient was given general endotracheal anesthesia in supine position, turned to prone position.  Ancef IV preoperatively.  The back was prepped and draped.  Time-out, patient identified.  Needle was placed.  Lateral x-rays obtained to confirm level.  Needles withdrawn.  Skin infiltrated with lidocaine with epinephrine.  Skin incision made over the tips of spinous processes of L3, 4, 5.  Dissection was carried down to the spinous process tips.  Spinous processes, lamina, and medial facet joints on the left of L3, 4, 5 exposed.  Needle was placed.  Lateral x-rays obtained to confirm level.  Needles withdrawn.  Micro retractor was placed at L3-4.  With use of the microscope, a partial hemilaminectomy of the inferior aspect of L3 on the left side was performed working superiorly, not laterally performing a medial facetectomy.  Laminectomy of the superior aspect of L4 detaching the hypertrophied ligamentum flavum, performing a foraminotomy for the exit of the L4 nerve around the L4 pedicle.  Microscope angled over the dorsal aspect of the dural sac to the patient's right side performing a partial hemilaminectomy of inferior aspect of L3, superior aspect of L4 removing the hypertrophied ligamentum flavum, medial facetectomy, foraminotomy.  Micro retractor

## 2025-07-11 NOTE — CARE COORDINATION
Inpatient Rehab referral received. Met with patient to discuss rehab referral as ordered by surgeon. Patient reports to being independent prior to sx and he is hopeful to be able to remain with plans of returning home when medically cleared. Reviewed and explained [facility] Inpatient Rehab program and requirements, including 3 hours of intense therapy daily, anticipated length of stay and goal of discharge to home. Advised of needing insurance approval for admission. Expressed therapy will need to evaluate for therapy needs to help determine dc options. Further expressed the acute side CM will meet with him as well to further explore dc options. Patient with plans to return home. Advised rehab will follow. PT/OT evals pending at this time.  Electronically signed by Denisha Clark on 7/11/2025 at 1:39 PM

## 2025-07-11 NOTE — ANESTHESIA POSTPROCEDURE EVALUATION
[facility]    Patient: Bayron Maya  MRN: [id number]  YOB: 1955  Date of evaluation: 7/11/2025    Procedure Summary       Date: 07/11/25 Room / Location: [address]    Anesthesia Start: 0736 Anesthesia Stop:     Procedure: L3-4, 4-5 decompression medial facetectomy foraminotomies (Back) Diagnosis:       Spinal stenosis of lumbar region with neurogenic claudication      (Spinal stenosis of lumbar region with neurogenic claudication [M48.062])    Surgeons: Anais Juan MD Responsible Provider: Pb Laguerre DO    Anesthesia Type: general, regional ASA Status: 3            Anesthesia Type: No value filed.    Mari Phase I: Mari Score: 4    Mari Phase II:      Anesthesia Post Evaluation    Patient location during evaluation: bedside  Patient participation: complete - patient participated  Level of consciousness: awake and awake and alert  Airway patency: patent  Nausea & Vomiting: no nausea and no vomiting  Cardiovascular status: blood pressure returned to baseline and hemodynamically stable  Respiratory status: acceptable  Hydration status: euvolemic  Pain management: adequate        No notable events documented.

## 2025-07-12 VITALS
OXYGEN SATURATION: 93 % | WEIGHT: 215 LBS | HEART RATE: 64 BPM | BODY MASS INDEX: 32.58 KG/M2 | HEIGHT: 68 IN | SYSTOLIC BLOOD PRESSURE: 148 MMHG | RESPIRATION RATE: 18 BRPM | TEMPERATURE: 97.7 F | DIASTOLIC BLOOD PRESSURE: 66 MMHG

## 2025-07-12 PROCEDURE — G0378 HOSPITAL OBSERVATION PER HR: HCPCS

## 2025-07-12 PROCEDURE — 2500000003 HC RX 250 WO HCPCS: Performed by: NEUROLOGICAL SURGERY

## 2025-07-12 PROCEDURE — 97535 SELF CARE MNGMENT TRAINING: CPT

## 2025-07-12 PROCEDURE — 6370000000 HC RX 637 (ALT 250 FOR IP): Performed by: FAMILY MEDICINE

## 2025-07-12 PROCEDURE — 6370000000 HC RX 637 (ALT 250 FOR IP): Performed by: NEUROLOGICAL SURGERY

## 2025-07-12 PROCEDURE — 6360000002 HC RX W HCPCS: Performed by: NEUROLOGICAL SURGERY

## 2025-07-12 PROCEDURE — 97116 GAIT TRAINING THERAPY: CPT

## 2025-07-12 PROCEDURE — 96374 THER/PROPH/DIAG INJ IV PUSH: CPT

## 2025-07-12 PROCEDURE — 2580000003 HC RX 258: Performed by: NEUROLOGICAL SURGERY

## 2025-07-12 PROCEDURE — 6370000000 HC RX 637 (ALT 250 FOR IP): Performed by: REGISTERED NURSE

## 2025-07-12 RX ORDER — CALCIUM CARBONATE 500 MG/1
500 TABLET, CHEWABLE ORAL 3 TIMES DAILY PRN
Status: DISCONTINUED | OUTPATIENT
Start: 2025-07-12 | End: 2025-07-12 | Stop reason: HOSPADM

## 2025-07-12 RX ADMIN — WATER 2000 MG: 1 INJECTION INTRAMUSCULAR; INTRAVENOUS; SUBCUTANEOUS at 01:30

## 2025-07-12 RX ADMIN — ACETAMINOPHEN 650 MG: 325 TABLET ORAL at 01:31

## 2025-07-12 RX ADMIN — ATORVASTATIN CALCIUM 80 MG: 80 TABLET, FILM COATED ORAL at 07:58

## 2025-07-12 RX ADMIN — SODIUM CHLORIDE: 0.9 INJECTION, SOLUTION INTRAVENOUS at 06:55

## 2025-07-12 RX ADMIN — CARVEDILOL 12.5 MG: 12.5 TABLET, FILM COATED ORAL at 07:58

## 2025-07-12 RX ADMIN — LOSARTAN POTASSIUM 25 MG: 25 TABLET, FILM COATED ORAL at 07:58

## 2025-07-12 RX ADMIN — KETOROLAC TROMETHAMINE 15 MG: 15 INJECTION, SOLUTION INTRAMUSCULAR; INTRAVENOUS at 01:31

## 2025-07-12 RX ADMIN — ANTACID TABLETS 500 MG: 500 TABLET, CHEWABLE ORAL at 07:57

## 2025-07-12 ASSESSMENT — PAIN SCALES - GENERAL: PAINLEVEL_OUTOF10: 4

## 2025-07-12 NOTE — PROGRESS NOTES
Physical Therapy Med Surg Daily Treatment Note  Facility/Department[place] [facility] [place] 2W [place]THO TELE  [facility]       NAME: Bayron Maya  : 1955 (70 y.o.)  MRN: [id number]  CODE STATUS: Full Code    Date of Service: 2025    Patient Diagnosis(es): Spinal stenosis of lumbar region with neurogenic claudication [M48.062]  Lumbar stenosis with neurogenic claudication [M48.062]   No chief complaint on file.    Patient Active Problem List    Diagnosis Date Noted    Lumbar stenosis with neurogenic claudication 2025    Chronic anticoagulation 2025    Acquired spondylolisthesis of lumbosacral region 2025    Spinal stenosis of lumbar region with neurogenic claudication 2025    H/O: CVA (cerebrovascular accident) 2025    Moderate mitral regurgitation 2025    Expressive aphasia 2025    Anemia 2024    H/O total shoulder replacement, right 2024    Kidney stone 2019    S/P coronary artery bypass graft x 5 10/01/2018    Essential hypertension 2018    Hyperlipidemia 2015        Past Medical History:   Diagnosis Date    Acute kidney injury 10/03/2018    Arthritis     Broken leg     Left    Cerebral artery occlusion with cerebral infarction (HCC) 2025    Chronic anticoagulation 2025    Congenital heart disease     Coronary atherosclerosis 2015    Hearing loss     HTN (hypertension) 2015    Hyperlipidemia 2015    Myocardial infarction (HCC) 2025    Comment on above: NON STEMI      Obesity 2023    PONV (postoperative nausea and vomiting)      Past Surgical History:   Procedure Laterality Date    CARDIAC SURGERY      CABG x5    CYSTOSCOPY N/A 2019    FLEXIBLE CYSTOSCOPY AND RIGHT STENT REMOVAL performed by Tobin Michelle MD at [facility] [place]    CYSTOSCOPY INSERTION / REMOVAL STENT / STONE Right 2019    CYSTOSCOPY RIGHT DOUBLE J STENT PLACEMENT performed by Tobin Michelle MD at [facility] [place]    EYE SURGERY       JOINT REPLACEMENT      left shoulder, both knees    KNEE ARTHROPLASTY      LITHOTRIPSY Right 05/15/2019    RIGHT ESWL performed by Tobin Michelle MD at [facility] [place]    LITHOTRIPSY N/A 07/10/2019    HOLMIUM  LASER LITHOTRIPSY REMOVAL OF STENT performed by Tobin Michelle MD at [facility] [place]    LITHOTRIPSY N/A 07/31/2019    HOLMIUM  LASER LITHOTRIPSY / INSERTION J STENT RIGHT performed by Tobin Michelle MD at [facility]    MT COLON CA SCRN NOT HI RSK IND N/A 10/17/2018    COLONOSCOPY performed by Judy Mendez MD at [facility] OR    URETER SURGERY N/A 07/10/2019    FLEXIBLE URETEROSCOPY, CYSTOSCOPY RETROGRADE URETEROSCOPY, STONE EXTRACTION, AND STENT INSERTION performed by Tobin Michelle MD at [facility]    URETER SURGERY Right 07/31/2019    RIGHT URETEROSCOPY performed by Tobin Michelle MD at [facility]       Chart Reviewed: Yes  Family/Caregiver Present: No    Restrictions:       SUBJECTIVE:   Subjective: pt agreeable to tx.    Pain  Pain  Pre-Pain: 2  Post-Pain: 2  Pain Location: Back  Pain Interventions: Repositioning     OBJECTIVE:        Bed mobility  Supine to Sit: Independent  Sit to Supine: Independent  Bed Mobility Comments: vc's for log roll technique, good carryover noted.    Transfers  Sit to Stand: Stand by assistance  Stand to Sit: Stand by assistance  Comment: no dizziness reported, vc's for improved hand placement and sequencing, good carryover noted.    Ambulation  Surface: Level tile  Device: No Device  Assistance: Supervision  Quality of Gait: increased KATARZYNA, decreased step length, easily distracted by IV line.  Distance: 150'  Comments: pt tolerates well, safer without AD at this time.    Stairs/Curb  Stairs?: Yes  Stairs  # Steps : 1  Stairs Height: 6\"  Device: Rolling walker  Assistance: Supervision  Comment: step up on box with WW to simulate entry stairs with B rails. pt completes without issue, states confidence completing upon discharge.                         Activity Tolerance  Activity Tolerance:

## 2025-07-12 NOTE — PLAN OF CARE
Problem: Chronic Conditions and Co-morbidities  Goal: Patient's chronic conditions and co-morbidity symptoms are monitored and maintained or improved  7/11/2025 2307 by González Webb RN  Outcome: Progressing  7/11/2025 1626 by Michela Mojica RN  Outcome: Progressing  Flowsheets (Taken 7/11/2025 1300)  Care Plan - Patient's Chronic Conditions and Co-Morbidity Symptoms are Monitored and Maintained or Improved: Monitor and assess patient's chronic conditions and comorbid symptoms for stability, deterioration, or improvement     Problem: Discharge Planning  Goal: Discharge to home or other facility with appropriate resources  7/11/2025 2307 by González Webb RN  Outcome: Progressing  7/11/2025 1626 by Michela Mojica RN  Outcome: Progressing  Flowsheets (Taken 7/11/2025 1300)  Discharge to home or other facility with appropriate resources: Identify barriers to discharge with patient and caregiver     Problem: Pain  Goal: Verbalizes/displays adequate comfort level or baseline comfort level  7/11/2025 2307 by González Webb RN  Outcome: Progressing  7/11/2025 1626 by Michela Mojica RN  Outcome: Progressing     Problem: Safety - Adult  Goal: Free from fall injury  7/11/2025 2307 by González Webb RN  Outcome: Progressing  7/11/2025 1626 by Michela Mojica RN  Outcome: Progressing  Flowsheets (Taken 7/11/2025 1300)  Free From Fall Injury: Instruct family/caregiver on patient safety     Problem: Occupational Therapy - Adult  Goal: By Discharge: Performs self-care activities at highest level of function for planned discharge setting.  See evaluation for individualized goals.  7/11/2025 1609 by Yvonne Mendez, OTR/L  Outcome: Progressing

## 2025-07-12 NOTE — DISCHARGE SUMMARY
Discharge Summary    Date:7/12/2025        Patient Name:Bayron Maya     YOB: 1955     Age:70 y.o.    Admit Date:7/11/2025   Admission Condition:fair   Discharged Condition:good  Discharge Date: 07/12/25     Discharge Diagnoses   Principal Problem:    Spinal stenosis of lumbar region with neurogenic claudication  Active Problems:    Essential hypertension    Lumbar stenosis with neurogenic claudication  Resolved Problems:    * No resolved hospital problems. *      Hospital Stay   Narrative of Hospital Course: 7/11/2025 lumbar 3 4, 4 5 microdecompression medial facetectomy foraminotomies.  Patient tolerated procedure well.  Wound drain removal postoperative day #1.  Hospitalist nurse practitioner Malou Soto and Dr. Hicks assisted evaluated treatment comorbidities including high blood pressure chronic anticoagulation.  Patient able to ambulate independently with bladder control.  Discharge to home in good condition 7/12/2020    Consultants:   IP CONSULT TO HOSPITALIST    Time Spent on Discharge:  10 minutes were spent in patient examination, evaluation, counseling as well as medication reconciliation, prescriptions for required medications, discharge plan and follow up.      Surgeries/Procedures Performed:  Procedure(s):  L3-4, 4-5 decompression medial facetectomy foraminotomies     Significant Diagnostic Studies:   Recent Labs:  CBC:   Lab Results   Component Value Date/Time    WBC 10.8 07/07/2025 02:45 PM    RBC 4.28 07/07/2025 02:45 PM    HGB 13.2 07/07/2025 02:45 PM    HCT 40.2 07/07/2025 02:45 PM    MCV 93.9 07/07/2025 02:45 PM    MCH 30.8 07/07/2025 02:45 PM    MCHC 32.8 07/07/2025 02:45 PM    RDW 13.5 07/07/2025 02:45 PM     07/07/2025 02:45 PM     BMP:    Lab Results   Component Value Date/Time    GLUCOSE 129 07/07/2025 02:45 PM     07/07/2025 02:45 PM    K 4.5 07/07/2025 02:45 PM    K 3.9 05/09/2019 06:59 AM     07/07/2025 02:45 PM    CO2 26 07/07/2025 02:45 PM

## 2025-07-12 NOTE — PLAN OF CARE
Problem: Chronic Conditions and Co-morbidities  Goal: Patient's chronic conditions and co-morbidity symptoms are monitored and maintained or improved  7/12/2025 1021 by Shruthi Gutierrez RN  Outcome: Adequate for Discharge  7/12/2025 0805 by Shruthi Gutierrez RN  Outcome: Progressing  7/11/2025 2307 by González Webb RN  Outcome: Progressing     Problem: Discharge Planning  Goal: Discharge to home or other facility with appropriate resources  7/12/2025 1021 by Shruthi Gutierrez RN  Outcome: Adequate for Discharge  7/12/2025 0805 by Shruthi Gutierrez RN  Outcome: Progressing  7/11/2025 2307 by González Webb RN  Outcome: Progressing     Problem: Pain  Goal: Verbalizes/displays adequate comfort level or baseline comfort level  7/12/2025 1021 by Shruthi Gutierrez RN  Outcome: Adequate for Discharge  7/12/2025 0805 by Shruthi Gutierrez RN  Outcome: Progressing  7/11/2025 2307 by González Webb RN  Outcome: Progressing     Problem: Safety - Adult  Goal: Free from fall injury  7/12/2025 1021 by Shruthi Gutierrez RN  Outcome: Adequate for Discharge  7/12/2025 0805 by Shruthi Gutierrez RN  Outcome: Progressing  7/11/2025 2307 by González Webb RN  Outcome: Progressing

## 2025-07-12 NOTE — PLAN OF CARE
Problem: Chronic Conditions and Co-morbidities  Goal: Patient's chronic conditions and co-morbidity symptoms are monitored and maintained or improved  7/12/2025 0805 by Shruthi Gutierrez RN  Outcome: Progressing  7/11/2025 2307 by González Webb RN  Outcome: Progressing     Problem: Discharge Planning  Goal: Discharge to home or other facility with appropriate resources  7/12/2025 0805 by Shruthi Gutierrez RN  Outcome: Progressing  7/11/2025 2307 by González Webb RN  Outcome: Progressing     Problem: Pain  Goal: Verbalizes/displays adequate comfort level or baseline comfort level  7/12/2025 0805 by Shruthi Gutierrez RN  Outcome: Progressing  7/11/2025 2307 by González Webb RN  Outcome: Progressing     Problem: Safety - Adult  Goal: Free from fall injury  7/12/2025 0805 by Shruthi Gutierrez RN  Outcome: Progressing  7/11/2025 2307 by González Webb RN  Outcome: Progressing

## 2025-07-12 NOTE — PROGRESS NOTES
Spiritual Health History and Assessment/Progress Note  [facility]    Initial Encounter,  ,  ,      Name: Bayron Maya MRN: [id number]    Age: 70 y.o.     Sex: male   Language: English   Confucianism: Holiness   Spinal stenosis of lumbar region with neurogenic claudication     Date: 7/12/2025            Total Time Calculated: 15 min              Spiritual Assessment began in [facility] 2W ORTHO TELE        Referral/Consult From: Rounding   Encounter Overview/Reason: Initial Encounter  Service Provided For: Patient    Patient lying in bed in no apparent distress. Patient spoke of current hospitalization and anticipation of discharge later today.  His ex-wife is here to take him home.   provided supportive presence, active listening, emotional and spiritual care and prayer at the end of visit.     Janell, Belief, Meaning:   Patient has beliefs or practices that help with coping during difficult times  Family/Friends Other: unknown      Importance and Influence:  Patient has spiritual/personal beliefs that influence decisions regarding their health  Family/Friends Other: unknown    Community:  Patient feels well-supported. Support system includes: Children  Family/Friends Other: unknown    Assessment and Plan of Care:     Patient Interventions include: Facilitated expression of thoughts and feelings and Affirmed coping skills/support systems  Family/Friends Interventions include: Other: unknown    Patient Plan of Care: No spiritual needs identified for follow-up  Family/Friends Plan of Care: No spiritual needs identified for follow-up    Electronically signed by Kasi Vierain Intern on 7/12/2025 at 10:22 AM\

## 2025-07-13 LAB
BLOOD BANK DISPENSE STATUS: NORMAL
BLOOD BANK DISPENSE STATUS: NORMAL
BLOOD BANK PRODUCT CODE: NORMAL
BLOOD BANK PRODUCT CODE: NORMAL
BPU ID: NORMAL
BPU ID: NORMAL
DESCRIPTION BLOOD BANK: NORMAL
DESCRIPTION BLOOD BANK: NORMAL

## 2025-07-13 NOTE — PROGRESS NOTES
Physical Therapy  Facility/Department: [facility] MED SURG W292/W292-01  Physical Therapy Discharge      NAME: Bayron Maya    : 1955 (70 y.o.)  MRN: [id number]    Account: [id number]  Gender: male      Patient has been discharged from acute care hospital. DC patient from current PT program.      Electronically signed by Minnie Grider PT on 25 at 1:22 PM EDT

## 2025-07-14 ENCOUNTER — TELEPHONE (OUTPATIENT)
Age: 70
End: 2025-07-14

## 2025-07-14 ENCOUNTER — TELEPHONE (OUTPATIENT)
Dept: FAMILY MEDICINE CLINIC | Age: 70
End: 2025-07-14

## 2025-07-14 NOTE — TELEPHONE ENCOUNTER
Called and spoke with Lea. Patient is 3 days post op and experiencing some pain and discomfort in low back region rated at an 8/10 on 1-10 scale.She stated that he is taking his pain medication as directed but it is only offering about a half hour of relief. Patient has been laying in bed for the last two days with very minimal movement. Dressing has been changed as directed and is looking good at this time. As patient is 3 days post operative at this time, some pain is to be expected relating to procedure.Though healing is patient specific and an exact time frame can not be given, it can take 6-8 weeks for the healing process. Encouraged movement/walking when patient is ready but not to over exert as patient is primarily in bed at this time. If patient notices any changes to incision site, an increase in pain, or any other symptoms, urged them to reach out to the office. Also ensured that they can call us here at the office with any other questions or concerns so that we can assist in any way that we can. Direct office line given. Lea stated her understanding and is agreeable. She stated that she wanted to ensure pain is normal following surgery. Will call with further questions.

## 2025-07-14 NOTE — TELEPHONE ENCOUNTER
Care Transitions Initial Follow Up Call    Outreach made within 2 business days of discharge: Yes    Patient: Bayron Maya Patient : 1955   MRN: [id number]  Reason for Admission: Spinal stenosis of lumbar region with neurogenic claudication   Discharge Date: 25       Spoke with: wife     Discharge department/facility: [facility]    TCM Interactive Patient Contact:  Was patient able to fill all prescriptions: Yes  Was patient instructed to bring all medications to the follow-up visit: Yes  Is patient taking all medications as directed in the discharge summary? Yes  Does patient understand their discharge instructions: Yes  Does patient have questions or concerns that need addressed prior to 7-14 day follow up office visit: no    Additional needs identified to be addressed with provider  No needs identified             Scheduled appointment with PCP within 7-14 days    Follow Up  Future Appointments   Date Time Provider Department Center   2025  1:00 PM Kingston Love MD [facility]   2025  1:00 PM Anais Juan MD [facility], [place]

## 2025-07-14 NOTE — TELEPHONE ENCOUNTER
Care Transitions Initial Follow Up Call    Outreach made within 2 business days of discharge: Yes    Patient: Bayron Maya Patient : 1955   MRN: [id number]  Reason for Admission: Spinal stenosis of lumbar region with neurogenic claudication   Discharge Date: 25       Spoke with: EDISON X1    Discharge department/facility: [facility]        Scheduled appointment with PCP within 7-14 days    Follow Up  Future Appointments   Date Time Provider Department Center   2025  1:00 PM Anais Juan MD [facility] [place], [place]

## 2025-07-14 NOTE — TELEPHONE ENCOUNTER
Patient will call back later to let office know if he wants to come in for an appointment this week or not. Did not wish to schedule at time of call.

## 2025-07-14 NOTE — TELEPHONE ENCOUNTER
Lea called back and stated that Bayron also stated he has had an increase in the amount of times he needs to use the bathroom. No complaints of diarrhea at this time; patient states he has to urinate more than he usually would. Unable to relay frequency at this time. No other symptoms reported.

## 2025-07-14 NOTE — TELEPHONE ENCOUNTER
PATIENTS WIFE CALLING IN REQUESTING A CALL BACK FROM DR ARAUJO OR A MEDICAL ASSISTANT.  PATIENT HAD SURGERY ON FRIDAY 7/11 AND IS ONLY GETTING A BRIEF PERIOD OF PAIN RELIEF   PLEASE ASSIST PATIENT    No

## 2025-07-15 ENCOUNTER — APPOINTMENT (OUTPATIENT)
Dept: CARDIOLOGY | Facility: CLINIC | Age: 70
End: 2025-07-15
Payer: MEDICARE

## 2025-07-17 NOTE — TELEPHONE ENCOUNTER
Patient's wife called back and stated the patient has been complaining of pain on the left side. Patient is having trouble going to the bathroom bowel movement  and she is concerned , they tried over the counter stool softeners magnesium citrate, and the incision is draining a little but its not bad. Patient is taking the pain medication as prescribe.I did offer the patient an appt today and he declined  Patient moved up there post op from 8/5 to 7/24. Will Dr Juan please advise?

## 2025-07-18 NOTE — PROGRESS NOTES
Patient Name: Bayron Maya : 1955        Date: 2025      Type of Appt: Post Op    Reason for appt:    25 - L3-4-5 decompression medial facetectomy foraminotomies              Studies done: No New Studies            [facility]  [address] [place] [address] [address]  [facility] [id number]  O:   F:         Patient: Bayron Maya  YOB: 1955  Date: 2025    The patient is a 70 y.o. male who presents today for follow up.    Patient increasing his activities.  The preoperative pain down the lower extremities especially on the right side is all gone.  His problems are itching around the area of the incision.  After hot shower he will notice some clear seepage.    Examination shows the skin edges are not completely opposed and have minimal exudate.  Suggest keeping the wound covered as it is rubbing on sheets and is close.  May use hydrocortisone cream over-the-counter for the itching.  Rx Keflex 500 mg twice daily 10 days.    Difficulty wound healing call for recheck appointment.   Discussed increasing activities as tolerated and details were discussed        MEGAN ARAUJO MD

## 2025-07-24 ENCOUNTER — OFFICE VISIT (OUTPATIENT)
Age: 70
End: 2025-07-24

## 2025-07-24 VITALS — WEIGHT: 204 LBS | TEMPERATURE: 97.5 F | BODY MASS INDEX: 32.02 KG/M2 | HEIGHT: 67 IN

## 2025-07-24 DIAGNOSIS — M48.062 SPINAL STENOSIS OF LUMBAR REGION WITH NEUROGENIC CLAUDICATION: Primary | ICD-10-CM

## 2025-07-24 RX ORDER — CEPHALEXIN 500 MG/1
500 CAPSULE ORAL 2 TIMES DAILY
Qty: 20 CAPSULE | Refills: 0 | Status: SHIPPED | OUTPATIENT
Start: 2025-07-24 | End: 2025-08-03

## 2025-07-30 ENCOUNTER — OFFICE VISIT (OUTPATIENT)
Dept: FAMILY MEDICINE CLINIC | Age: 70
End: 2025-07-30
Payer: MEDICARE

## 2025-07-30 VITALS
BODY MASS INDEX: 31.86 KG/M2 | SYSTOLIC BLOOD PRESSURE: 106 MMHG | TEMPERATURE: 97 F | HEIGHT: 67 IN | OXYGEN SATURATION: 98 % | DIASTOLIC BLOOD PRESSURE: 68 MMHG | HEART RATE: 74 BPM | WEIGHT: 203 LBS

## 2025-07-30 DIAGNOSIS — M48.062 LUMBAR STENOSIS WITH NEUROGENIC CLAUDICATION: ICD-10-CM

## 2025-07-30 DIAGNOSIS — Z09 HOSPITAL DISCHARGE FOLLOW-UP: Primary | ICD-10-CM

## 2025-07-30 PROCEDURE — 3074F SYST BP LT 130 MM HG: CPT | Performed by: FAMILY MEDICINE

## 2025-07-30 PROCEDURE — 3078F DIAST BP <80 MM HG: CPT | Performed by: FAMILY MEDICINE

## 2025-07-30 PROCEDURE — 1159F MED LIST DOCD IN RCRD: CPT | Performed by: FAMILY MEDICINE

## 2025-07-30 PROCEDURE — 99214 OFFICE O/P EST MOD 30 MIN: CPT | Performed by: FAMILY MEDICINE

## 2025-07-30 PROCEDURE — 1123F ACP DISCUSS/DSCN MKR DOCD: CPT | Performed by: FAMILY MEDICINE

## 2025-07-30 PROCEDURE — 1111F DSCHRG MED/CURRENT MED MERGE: CPT | Performed by: FAMILY MEDICINE

## 2025-07-30 RX ORDER — ISOSORBIDE MONONITRATE 30 MG/1
TABLET, EXTENDED RELEASE ORAL
COMMUNITY
Start: 2025-06-28

## 2025-07-30 NOTE — PROGRESS NOTES
[facility] PRIMARY [facility]ARE  [address] [place] [id number]  Dept: [phone]  Dept Fax: [phone]     Chief Complaint:  Chief Complaint   Patient presents with    Follow-Up from Hospital     7/11/2025 - 7/12/2025 (29 hours), [facility], Spinal stenosis of lumbar region with neurogenic claudication       Vitals:    07/30/25 1102   BP: 106/68   Pulse: 74   Temp: 97 °F (36.1 °C)   TempSrc: Infrared   SpO2: 98%   Weight: 92.1 kg (203 lb)   Height: 1.702 m (5' 7\")       HPI:  70 y.o.male who presents for the following:      Hosp f/u: underwent spine surgery with Dr. Juan recently; uncomplicated hospital course; at recent postop visit with surgeon had some itching over surgical site so started on 10 days keflex; feeling improved pain; tolerating PO; no constipation; no limb numbness/tingling/weakness     Admit Date:7/11/2025   Discharge Date: 07/12/25   Hospital Course: 7/11/2025 lumbar 3 4, 4 5 microdecompression medial facetectomy foraminotomies.  Patient tolerated procedure well.  Wound drain removal postoperative day #1.  Hospitalist nurse practitioner Malou Soto and Dr. Hicks assisted evaluated treatment comorbidities including high blood pressure chronic anticoagulation.  Patient able to ambulate independently with bladder control.     -----------------------------------------------------------------------------    Assessment/Plan:  70 y.o. male here mainly for the following:  S/p spine surgery  Doing much better after surgery; already complete surgical f/u; incision looks great        ICD-10-CM    1. Hospital discharge follow-up  Z09 [facility] DISCHARGE MEDS RECONCILED W/ CURRENT OUTPATIENT MED LIST      2. Lumbar stenosis with neurogenic claudication  M48.062           Return if symptoms worsen or fail to improve.    Kingston Love MD      -----------------------------------------------------------------------------      Objective     Physical Exam:  Physical Exam  Vitals

## 2026-01-27 ENCOUNTER — APPOINTMENT (OUTPATIENT)
Dept: CARDIOLOGY | Facility: CLINIC | Age: 71
End: 2026-01-27
Payer: MEDICARE

## (undated) DEVICE — GOWN,AURORA,NONREINFORCED,LARGE: Brand: MEDLINE

## (undated) DEVICE — SYRINGE MED 10ML LUERLOCK TIP W/O SFTY DISP

## (undated) DEVICE — STOCKINETTE, IMPERVIOUS, LARGE, 9IN X 48IN

## (undated) DEVICE — CYSTO/BLADDER IRRIGATION SET, REGULATING CLAMP

## (undated) DEVICE — ANGLED TIP URETERAL CATHETER WITH BENTSON PTFE WIRE GUIDE: Brand: ANGLED TIP

## (undated) DEVICE — DRILL, 6MM CANNULATED, 20MM

## (undated) DEVICE — GUIDEWIRE URO L150CM DIA0.035IN TAPR 8CM STR TIP STD SHFT

## (undated) DEVICE — CARBIDE MATCH HEAD

## (undated) DEVICE — GOWN,SIRUS,POLYRNF,BRTHSLV,XLN/XL,20/CS: Brand: MEDLINE

## (undated) DEVICE — STRAP, ARM BOARD, 32 X 1.5

## (undated) DEVICE — CATHETER, DIAGNOSTIC, 4FR-PIG 145 DEG-6SH,MICRO LOOP

## (undated) DEVICE — TOWEL,OR,DSP,ST,BLUE,STD,4/PK,20PK/CS: Brand: MEDLINE

## (undated) DEVICE — GUIDEWIRE URO L145CM DIA0.035IN TIP L7CM S STL PTFE BENT

## (undated) DEVICE — CONTAINER,SPECIMEN,OR STERILE,4OZ: Brand: MEDLINE

## (undated) DEVICE — GLOVE ORANGE PI 7 1/2   MSG9075

## (undated) DEVICE — GLOVE, SURGICAL, PROTEXIS PI , 7.0, PF, LF

## (undated) DEVICE — SOLUTION, INJECTION, USP, SODIUM CHLORIDE 0.9%, .9, NACL, 1000 ML, BAG

## (undated) DEVICE — COVER FT SWCH W15XL17IN GRY ALL OEC SYS

## (undated) DEVICE — TRAY PREP DRY W/ PREM GLV 2 APPL 6 SPNG 2 UNDPD 1 OVERWRAP

## (undated) DEVICE — SINGLE ACTION PUMPING SYSTEM

## (undated) DEVICE — NEURO: Brand: MEDLINE INDUSTRIES, INC.

## (undated) DEVICE — NITINOL STONE RETRIEVAL FORCEPS: Brand: GRASPIT

## (undated) DEVICE — BATH BLANKET STERILE

## (undated) DEVICE — JELLY,LUBE,STERILE,FLIP TOP,TUBE,2-OZ: Brand: MEDLINE

## (undated) DEVICE — COVER, MAYO STAND, W/PAD, 23 IN, DISPOSABLE, PLASTIC, LF, STERILE

## (undated) DEVICE — MEDI-VAC NON-CONDUCTIVE SUCTION TUBING: Brand: CARDINAL HEALTH

## (undated) DEVICE — SUTURE ABSORBABLE ANTIBACT 1-0 CT-1 24 IN STRATAFIX PDS + SXPP1A443

## (undated) DEVICE — EVEREST 30 INFLATION DEVICE 8 F PACKAGED WITH 1 THREE-WAY STOPCOCK

## (undated) DEVICE — Device

## (undated) DEVICE — TUBING, IRRIGATION, HIGH FLOW, HAND PIECE SET

## (undated) DEVICE — SHEATH, PINNACLE, W/.038 GUIDEWIRE, 10 CM,  6FR INTRODUCER, 6FR DIA, 2.5 CM DIALATOR

## (undated) DEVICE — GLOVE ORANGE PI 8   MSG9080

## (undated) DEVICE — LABEL MED MINI W/ MARKER

## (undated) DEVICE — SUTURE VICRYL + SZ 2-0 L27IN ABSRB UD CP-1 1/2 CIR REV CUT VCP266H

## (undated) DEVICE — TRAYS TRANSPORT SCOPE OASIS W/LID

## (undated) DEVICE — SUTURE, VICRYL PLUS, 1, 8X27IN, CT-1, CR, UND, BRAIDED

## (undated) DEVICE — LIQUIBAND RAPID ADHESIVE 36/CS 0.8ML: Brand: MEDLINE

## (undated) DEVICE — HOLMIUM 365-2 FIBER

## (undated) DEVICE — SONY PRINTER PAPER

## (undated) DEVICE — Device: Brand: ENDO SMARTCAP

## (undated) DEVICE — 1010 S-DRAPE TOWEL DRAPE 10/BX: Brand: STERI-DRAPE™

## (undated) DEVICE — DRESSING, MEPILEX BORDER, POST-OP AG, 4 X 10 IN

## (undated) DEVICE — SUTURE, MONOCRYL, 4-0, 27 IN, PS-2, UNDYED

## (undated) DEVICE — ACCESS KIT, S-MAK MINI, 5FR 10CM 0.018IN 40CM, SS/SS, ECHO ENHANCE NEEDLE

## (undated) DEVICE — CATHETER SCLERO L240CM NDL 25GA L4MM SHTH DIA2.3MM CNTRST

## (undated) DEVICE — 4-PORT MANIFOLD: Brand: NEPTUNE 2

## (undated) DEVICE — SUTURE VICRYL SZ 4-0 L18IN ABSRB UD L19MM PS-2 3/8 CIR PRIM J496H

## (undated) DEVICE — BLADE, GEN COATED 2.75, LF

## (undated) DEVICE — PREP, IODOPHOR, W/ALCOHOL, DURAPREP, W/APPLICATOR, 26 CC

## (undated) DEVICE — SUTURE VICRYL SZ 3-0 L18IN ABSRB UD PS-2 L19MM 3/8 CRV PRIM J497H

## (undated) DEVICE — FLOSEAL WITH RECOTHROM - 10ML.: Brand: FLOSEAL HEMOSTATIC MATRIX

## (undated) DEVICE — CLOSURE SYSTEM, VASCULAR, VASCADE 6/7F VCS

## (undated) DEVICE — ELECTRODE PT RET AD L9FT HI MOIST COND ADH HYDRGEL CORDED

## (undated) DEVICE — NITINOL STONE RETRIEVAL BASKET: Brand: ESCAPE

## (undated) DEVICE — HOOD, SURGICAL, FLYTE, T7

## (undated) DEVICE — POUCH DRNGE BG POLYETH FOR SKYTRON UROLOGY TBL

## (undated) DEVICE — GRASPING FORCEPS: Brand: COOK

## (undated) DEVICE — TUBE SET 96 MM 64 MM H2O PERISTALTIC STD AUX CHANNEL

## (undated) DEVICE — TUBING, MANIFOLD, LOW PRESSURE

## (undated) DEVICE — STRAP, VELCRO, BODY, 4 X 60IN, NS

## (undated) DEVICE — GAUZE,SPONGE,DRAIN,4"X4",6PLY,STRL,2'S: Brand: MEDLINE

## (undated) DEVICE — DUAL LUMEN URETERAL CATHETER

## (undated) DEVICE — EVACUATOR URO BLDR W/ ADPT UROVAC

## (undated) DEVICE — SHEATH, PINNACLE, W/.035 GUIDEWIRE, 10 CM,  4FR INTRODUCER, 4FR DIA, 2.5 CM DIALATOR

## (undated) DEVICE — SHEATH URET L36CM OD13FR ID11FR HYDRPHLC 2 TAPR RADPQ KINK

## (undated) DEVICE — SYRINGE MED 30ML STD CLR PLAS LUERLOCK TIP N CTRL DISP

## (undated) DEVICE — RENTAL ESWL SERVICES UNILATERAL

## (undated) DEVICE — WOUND SYSTEM, DEBRIDEMENT & CLEANING, O.R DUOPAK

## (undated) DEVICE — ACCESS KIT, MINI MAK, 5FR X 10CM, 0.018 X 40CM, SS/SS, STANDARD NEEDLE

## (undated) DEVICE — DILATOR SURG URET 16FR

## (undated) DEVICE — ENDO CARRY-ON PROCEDURE KIT INCLUDES LUBRICANT, DEFENDO OLYMPUS AIR, WATER, SUCTION, BIOPSY VALVE KIT, ENZYMATIC SPONGE, AND BASIN.: Brand: ENDO CARRY-ON PROCEDURE KIT

## (undated) DEVICE — OPEN-END URETERAL CATHETER: Brand: COOK

## (undated) DEVICE — DBD-PACK,CYSTOSCOPY,PK VI,AURORA: Brand: MEDLINE

## (undated) DEVICE — COVER MICSCP W46XL120IN 4 BINOC GLS LENS LEICA

## (undated) DEVICE — GOWN,AURORA,NONRNF,XL,30/CS: Brand: MEDLINE

## (undated) DEVICE — GLOVE, SURGICAL, PROTEXIS PI , 7.5, PF, LF

## (undated) DEVICE — GLOVE, SURGICAL, PROTEXIS PI BLUE W/NEUTHERA, 7.0, PF, LF

## (undated) DEVICE — SUTURE VICRYL SZ 0 L27IN ABSRB UD L26MM CP-2 1/2 CIR SGL J870H

## (undated) DEVICE — KIT, SCHLEIN, BEACH CHAIR, LF

## (undated) DEVICE — AGENT HEMOSTATIC SURGIFLOW MATRIX KIT W/THROMBIN

## (undated) DEVICE — GUIDEWIRE, EMERALD, J-TIP, 0.035 X 15CM, STANDARD

## (undated) DEVICE — BANDAGE, COFLEX, 4 X 5 YDS, FOAM TAN, STERILE, LF

## (undated) DEVICE — CATHETER, DIAGNOSTIC, AMPLATZ, 5 FR, AR MOD

## (undated) DEVICE — SINGLE-USE DIGITAL FLEXIBLE URETEROSCOPE: Brand: LITHOVUE

## (undated) DEVICE — GUIDEWIRE, RUN THROUGH WIRE, 180CM

## (undated) DEVICE — TUOHY-BORST SIDE-ARM ADAPTER: Brand: COOK

## (undated) DEVICE — ADHESIVE, SKIN, LIQUIBAND EXCEED

## (undated) DEVICE — ADAPTER FLSH PMP FLD MGMT GI IRRIG OFP 2 DISPOSABLE

## (undated) DEVICE — FORCEPS BX L240CM JAW DIA2.4MM ORNG L CAP W/ NDL DISP RAD

## (undated) DEVICE — TUBE ENDOSCP COLON CHANNEL

## (undated) DEVICE — MULTI-WIRE STONE SWEEPING DEVICE: Brand: LESLIE PARACHUTE

## (undated) DEVICE — NITINOL STONE RETRIEVAL BASKET: Brand: ZERO TIP

## (undated) DEVICE — CATHETER, VISTA BRIGHT TIP, 6FR AR1, RIGHT

## (undated) DEVICE — HOOKED-PRONG GRASPING FORCEPS: Brand: TRICEP

## (undated) DEVICE — DRAPE, INCISE, ANTIMICROBIAL, IOBAN 2, LARGE, 17 X 23 IN, DISPOSABLE, STERILE

## (undated) DEVICE — CATHETER, DIAGNOSTIC, 4FR-IM

## (undated) DEVICE — GLOVE ORANGE PI 8 1/2   MSG9085

## (undated) DEVICE — TRAP POLYP BALEEN

## (undated) DEVICE — MAT, FLOOR, STANDARD FLUID BARRIER, 32X44, GREEN

## (undated) DEVICE — HEMOSTASIS VALVE KIT

## (undated) DEVICE — SURGIFOAM SPNG SZ 12-7

## (undated) DEVICE — CATHETER, DIAGNOSTIC, 4FR-AR MOD

## (undated) DEVICE — SUTURE, ETHIBOND EXCEL 2-0, TAPER POINT V-7 GREEN 30 INCH

## (undated) DEVICE — KAIRISON TUBING SET PNEUMATIC, (3000 MM), STERILE, DISPOSABLE, TO BE USED WITH: FK898R, PACKAGE OF 10 PIECES: Brand: KAIRISON

## (undated) DEVICE — TIP, SUCTION, YANKAUER, FLEXIBLE

## (undated) DEVICE — 2000CC GUARDIAN II: Brand: GUARDIAN

## (undated) DEVICE — Z CONVERTED USE 2271043 CONTAINER SPEC COLL 4OZ SCR ON LID PEEL PCH

## (undated) DEVICE — CATHETER, DIAGNOSTIC, 4 FR-JL 4

## (undated) DEVICE — DRILL, REVERSE SHOULDER, 2.5MM, STERILE